# Patient Record
Sex: MALE | Race: WHITE | NOT HISPANIC OR LATINO | Employment: FULL TIME | URBAN - METROPOLITAN AREA
[De-identification: names, ages, dates, MRNs, and addresses within clinical notes are randomized per-mention and may not be internally consistent; named-entity substitution may affect disease eponyms.]

---

## 2019-03-04 PROBLEM — I10 HTN (HYPERTENSION): Status: ACTIVE | Noted: 2019-03-04

## 2019-03-04 PROBLEM — C34.90 METASTATIC LUNG CANCER (METASTASIS FROM LUNG TO OTHER SITE) (CMS/HCC): Status: ACTIVE | Noted: 2019-03-04

## 2019-03-04 PROBLEM — C79.31 LUNG CANCER METASTATIC TO BRAIN (CMS/HCC): Status: ACTIVE | Noted: 2019-03-04

## 2019-03-04 PROBLEM — I34.0 MITRAL REGURGITATION: Status: ACTIVE | Noted: 2019-03-04

## 2019-03-04 PROBLEM — E78.5 HYPERLIPIDEMIA: Status: ACTIVE | Noted: 2019-03-04

## 2019-03-04 NOTE — PROGRESS NOTES
Cardiac Surgery    Reason for consultation: Mitral regurgitation     HPI  Patient is a 58 y.o. male here for a surgical evaluation of his mitral regurgitation.  He is referred by Dr. Dempsey.  He has a history of lung cancer diagnosed 5 years ago with mets to the brain that was treated with radiation.  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  This was treated with steroids with good results.  Most recent Brain scan showed improvement.  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  In November he was diagnosed with a murmur and an echo was done that showed mitral regurgitation.  At that time he was experiencing increased BYERS with a significant weight gain (on steroids) as well as edema.  He was started on diuretics.  Recently they were increased to BID with improvement of symptoms.  He does have BYERS with stair climbing but is able to walk flat surfaces without much BYERS.   He rates the BYERS as moderate and it is relieved by rest.  Associated symptoms include LE edema.  He denies fatigue, chest pain, dizziness.  He does have a recent CT that shows a fast growing LLL nodule and a small right pleural effusion.     Medical History:   Past Medical History:   Diagnosis Date   • HTN (hypertension) 3/4/2019   • Hyperlipidemia 3/4/2019   • Metastatic lung cancer (metastasis from lung to other site) (CMS/HCC) (HCC) 3/4/2019   • Mitral regurgitation 3/4/2019       Surgical History:   Past Surgical History:   Procedure Laterality Date   • CARDIAC CATHETERIZATION     • HERNIA REPAIR         Allergies: Patient has no known allergies.    Current Outpatient Prescriptions   Medication Sig Dispense Refill   • chlorhexidine (PERIDEX) 0.12 % solution Swish and spit 15 mL 2 (two) times a day.     • furosemide (LASIX) 40 mg tablet Take 40 mg by mouth 2 (two) times a day.     • lisinopril (PRINIVIL) 20 mg tablet Take 20 mg by mouth 2 (two) times a day.     • lorlatinib (LORBRENA) 25 mg tablet Take 75 mg by mouth daily.      • metoprolol succinate XL (TOPROL-XL) 25 mg 24 hr tablet Take 25 mg by mouth daily.     • potassium chloride (KLOR-CON ORAL) Take 10 mEq by mouth. Every other day       • rosuvastatin (CRESTOR) 10 mg tablet Take 10 mg by mouth daily.       No current facility-administered medications for this visit.        Social History:   Social History     Social History   • Marital status:      Spouse name: N/A   • Number of children: N/A   • Years of education: N/A     Social History Main Topics   • Smoking status: Never Smoker   • Smokeless tobacco: Never Used   • Alcohol use Yes   • Drug use: No   • Sexual activity: Not Asked     Other Topics Concern   • None     Social History Narrative   • None       Family History:   Family History   Problem Relation Age of Onset   • COPD Mother    • Multiple myeloma Mother    • Lung cancer Father    • Cancer Sister         thyroid       Review of Systems  Review of Systems   Constitutional: Negative for fatigue.   Respiratory: Positive for shortness of breath. Negative for chest tightness.    Cardiovascular: Negative for chest pain.   Musculoskeletal: Negative for arthralgias and back pain.   Neurological: Positive for light-headedness. Negative for dizziness.   Psychiatric/Behavioral: Negative for agitation, behavioral problems and confusion.   All other systems reviewed and are negative.      Objective     Vital Signs for the last 24 hours:  Temp:  [37 °C (98.6 °F)] 37 °C (98.6 °F)  Heart Rate:  [72] 72  Resp:  [14] 14  BP: (140)/(88) 140/88    Physical Exam   Constitutional: He is oriented to person, place, and time. He appears well-developed and well-nourished.   HENT:   Head: Atraumatic.   Eyes: EOM are normal. Pupils are equal, round, and reactive to light.   Neck: Neck supple.   Cardiovascular: Regular rhythm and intact distal pulses.    Murmur heard.   Systolic murmur is present with a grade of 2/6   Holosystolic radiating from apex to base   Pulmonary/Chest: Effort  normal and breath sounds normal.   Abdominal: Bowel sounds are normal.   Musculoskeletal: Normal range of motion. He exhibits edema.   Neurological: He is alert and oriented to person, place, and time.   Skin: Skin is warm and dry. Capillary refill takes 2 to 3 seconds.   Psychiatric: He has a normal mood and affect. His behavior is normal. Thought content normal.   Vitals reviewed.          Assessment/Plan     Assessment:  Mitral Insufficiency  Echo, and CTA reviewed by Dr. Pepe.  Severe mitral regurgitation with large posterior leaflet flail encompassing the entire P2 portion.  Ruptured chorde present. PFO present. Ef 45%.  Symptoms medically managed with diuretics.  NYHC III.  No CAD.  No chest discomfort and No syncopal or presyncopal episodes. Patient tolerating medical therapy. Right pleural effusion noted on CT.  Patient states this responded well to increased diuretics and has decreased in size.    Plan:  STS risk for surgical intervention/repair is < 1%.  Recommend a mitral valve repair procedure.  Will need a CTA abdomen and pelvis to determine surgical approach as minimally invasive.  He is scheduled for a repeat CTof the chest on Thursday of this week.  He will also need carotid ultrasound, dental clearance and preadmission testing prior to surgery.  I have discussed with the patient and the family the rationale for the procedure as well as possible alternatives.  We discussed potential risks and complications associated with this procedure.  The patient and family understand and agree to proceed.    Metastatic lung CA.  He was treated with chemo  For the lung and radiation therapy for the brain.  He is currently on oral chemo lorlantinib 75mg daily.  He follows with Dr. Post from oncology.  We do have a note that states 1 year survival is reasonable.    Plan:  Will need to determine what to do with chemo agent around the time of surgery.  When to hold and when to restart.    IKimberly,  CARIE am scribing for and in the presence of CARIE Patel

## 2019-03-05 ENCOUNTER — CONSULT (OUTPATIENT)
Dept: CARDIOTHORACIC SURGERY | Facility: CLINIC | Age: 59
End: 2019-03-05
Payer: COMMERCIAL

## 2019-03-05 VITALS
RESPIRATION RATE: 14 BRPM | DIASTOLIC BLOOD PRESSURE: 88 MMHG | BODY MASS INDEX: 34.27 KG/M2 | OXYGEN SATURATION: 95 % | HEART RATE: 72 BPM | TEMPERATURE: 98.6 F | WEIGHT: 253 LBS | HEIGHT: 72 IN | SYSTOLIC BLOOD PRESSURE: 140 MMHG

## 2019-03-05 DIAGNOSIS — I34.0 MITRAL VALVE INSUFFICIENCY, UNSPECIFIED ETIOLOGY: Primary | ICD-10-CM

## 2019-03-05 PROCEDURE — 99204 OFFICE O/P NEW MOD 45 MIN: CPT | Performed by: THORACIC SURGERY (CARDIOTHORACIC VASCULAR SURGERY)

## 2019-03-05 RX ORDER — LISINOPRIL 20 MG/1
20 TABLET ORAL 2 TIMES DAILY
COMMUNITY
End: 2019-04-11 | Stop reason: HOSPADM

## 2019-03-05 RX ORDER — MUPIROCIN 20 MG/G
1 OINTMENT TOPICAL 2 TIMES DAILY
Qty: 22 G | Refills: 0 | Status: SHIPPED | OUTPATIENT
Start: 2019-03-05 | End: 2019-03-26 | Stop reason: SDUPTHER

## 2019-03-05 RX ORDER — ROSUVASTATIN CALCIUM 10 MG/1
10 TABLET, COATED ORAL DAILY
COMMUNITY

## 2019-03-05 RX ORDER — CHLORHEXIDINE GLUCONATE ORAL RINSE 1.2 MG/ML
15 SOLUTION DENTAL 2 TIMES DAILY
COMMUNITY
End: 2019-04-11 | Stop reason: HOSPADM

## 2019-03-05 RX ORDER — FUROSEMIDE 40 MG/1
40 TABLET ORAL 2 TIMES DAILY
COMMUNITY
End: 2019-05-21

## 2019-03-05 RX ORDER — METOPROLOL SUCCINATE 25 MG/1
50 TABLET, EXTENDED RELEASE ORAL DAILY
COMMUNITY

## 2019-03-05 ASSESSMENT — ENCOUNTER SYMPTOMS
BACK PAIN: 0
SHORTNESS OF BREATH: 1
AGITATION: 0
ARTHRALGIAS: 0
DIZZINESS: 0
CHEST TIGHTNESS: 0
LIGHT-HEADEDNESS: 1
FATIGUE: 0
CONFUSION: 0

## 2019-03-06 ENCOUNTER — TELEPHONE (OUTPATIENT)
Dept: CARDIOTHORACIC SURGERY | Facility: CLINIC | Age: 59
End: 2019-03-06

## 2019-03-06 NOTE — TELEPHONE ENCOUNTER
Called Dr. Post regarding patient's ongoing cancer treatment.  Explained a transcatheter Mitral clip not best option based on valve pathology and that minimally invasive mitral repair offered to patient.  He expressed concern with patients osteonecrosis of the jaw and that the patient has 2 areas of exposed bone that ENT is following.  It is not currently infected and ENT will debride as necessary.  Discussed the fast growing LLL nodule that he suspects is inflammatory.  Also discussed the right pleural effusion and if still present vs. Worse the need for a diagnostic thoracentesis to r/o malignant cells prior to scheduled 3/29 surgery.  CTA scheduled for 3/12.   Dr. Post will discuss recommendations for holding oral chemo Lorlatinib with patient independent of this office. Will touch base with Dr. Post on 3/14 to discuss case further.

## 2019-03-07 ENCOUNTER — TELEPHONE (OUTPATIENT)
Dept: SCHEDULING | Facility: CLINIC | Age: 59
End: 2019-03-07

## 2019-03-07 NOTE — TELEPHONE ENCOUNTER
OK.  Will wait until after his CTA next week and a discussion with his cardiologist and the confirmation of his surgical procedure before formulating a letter.

## 2019-03-07 NOTE — TELEPHONE ENCOUNTER
"Juan Manuel-  I talked with Mrs. Fields - we do participate but they have to pay out of network as it's consisdered \"out of network\" so the plan is like a 80/20 plan up until a certain amount.  She said you can email the letter.  Thanks.      "

## 2019-03-07 NOTE — TELEPHONE ENCOUNTER
Heidi wife called for patient. He is scheduled for  MVR on 3/29. We are out of network with his insurance. Can you write a letter stating there is no one in the [place] area that does this procedure? Dr Dempsey suggested she ask for letter.  She is hoping the insurance would treat Dr Pepe as in network.  If letter can be written please mail to patient and call to advise to watch for it.   [phone].

## 2019-03-08 PROBLEM — I34.0 MITRAL VALVE INSUFFICIENCY: Status: ACTIVE | Noted: 2019-03-08

## 2019-03-20 ENCOUNTER — ANESTHESIA EVENT (INPATIENT)
Dept: OPERATING ROOM | Facility: HOSPITAL | Age: 59
DRG: 003 | End: 2019-03-20
Payer: COMMERCIAL

## 2019-03-20 ENCOUNTER — APPOINTMENT (OUTPATIENT)
Dept: PREADMISSION TESTING | Facility: HOSPITAL | Age: 59
End: 2019-03-20
Attending: THORACIC SURGERY (CARDIOTHORACIC VASCULAR SURGERY)
Payer: COMMERCIAL

## 2019-03-20 VITALS
HEIGHT: 72 IN | OXYGEN SATURATION: 93 % | RESPIRATION RATE: 16 BRPM | BODY MASS INDEX: 33.67 KG/M2 | HEART RATE: 65 BPM | TEMPERATURE: 97.7 F | WEIGHT: 248.6 LBS | DIASTOLIC BLOOD PRESSURE: 79 MMHG | SYSTOLIC BLOOD PRESSURE: 123 MMHG

## 2019-03-20 DIAGNOSIS — I34.0 MITRAL VALVE INSUFFICIENCY, UNSPECIFIED ETIOLOGY: ICD-10-CM

## 2019-03-20 DIAGNOSIS — Z01.818 ENCOUNTER FOR PREADMISSION TESTING: Primary | ICD-10-CM

## 2019-03-20 LAB
ABO + RH BLD: NORMAL
ALBUMIN SERPL-MCNC: 3.9 G/DL (ref 3.4–5)
ALP SERPL-CCNC: 47 IU/L (ref 35–126)
ALT SERPL-CCNC: 23 IU/L (ref 16–63)
ANION GAP SERPL CALC-SCNC: 12 MEQ/L (ref 3–15)
AST SERPL-CCNC: 19 IU/L (ref 15–41)
BILIRUB SERPL-MCNC: 0.5 MG/DL (ref 0.3–1.2)
BILIRUB UR QL STRIP.AUTO: NEGATIVE MG/DL
BLD GP AB SCN SERPL QL: NEGATIVE
BLOOD BANK CMNT PATIENT-IMP: NORMAL
BNP SERPL-MCNC: 79 PG/ML
BUN SERPL-MCNC: 9 MG/DL (ref 8–20)
CALCIUM SERPL-MCNC: 9.4 MG/DL (ref 8.9–10.3)
CHLORIDE SERPL-SCNC: 105 MEQ/L (ref 98–109)
CLARITY UR REFRACT.AUTO: CLEAR
CO2 SERPL-SCNC: 24 MEQ/L (ref 22–32)
COLOR UR AUTO: YELLOW
CREAT SERPL-MCNC: 1.1 MG/DL
D AG BLD QL: POSITIVE
ERYTHROCYTE [DISTWIDTH] IN BLOOD BY AUTOMATED COUNT: 14.4 % (ref 11.6–14.4)
GFR SERPL CREATININE-BSD FRML MDRD: >60 ML/MIN/1.73M*2
GLUCOSE SERPL-MCNC: 99 MG/DL (ref 70–99)
GLUCOSE UR STRIP.AUTO-MCNC: NEGATIVE MG/DL
HCT VFR BLDCO AUTO: 40.9 %
HGB BLD-MCNC: 12.8 G/DL
HGB UR QL STRIP.AUTO: NEGATIVE
INR PPP: 1 INR
KETONES UR STRIP.AUTO-MCNC: NEGATIVE MG/DL
LABORATORY COMMENT REPORT: NORMAL
LEUKOCYTE ESTERASE UR QL STRIP.AUTO: NEGATIVE
MCH RBC QN AUTO: 27.7 PG (ref 28–33.2)
MCHC RBC AUTO-ENTMCNC: 31.3 G/DL (ref 32.2–36.5)
MCV RBC AUTO: 88.5 FL (ref 83–98)
NITRITE UR QL STRIP.AUTO: NEGATIVE
PDW BLD AUTO: 10.7 FL (ref 9.4–12.4)
PH UR STRIP.AUTO: 6.5 [PH]
PLATELET # BLD AUTO: 217 K/UL
POTASSIUM SERPL-SCNC: 3.8 MEQ/L (ref 3.6–5.1)
PREALB SERPL-MCNC: 25.1 MG/DL (ref 18–38)
PROT SERPL-MCNC: 6.5 G/DL (ref 6–8.2)
PROT UR QL STRIP.AUTO: NEGATIVE
PROTHROMBIN TIME: 12.8 SEC (ref 12.2–14.5)
RBC # BLD AUTO: 4.62 M/UL (ref 4.5–5.8)
SODIUM SERPL-SCNC: 141 MEQ/L (ref 136–144)
SP GR UR REFRACT.AUTO: 1.01
UROBILINOGEN UR STRIP-ACNC: 0.2 EU/DL
WBC # BLD AUTO: 7.54 K/UL

## 2019-03-20 PROCEDURE — 86901 BLOOD TYPING SEROLOGIC RH(D): CPT

## 2019-03-20 PROCEDURE — 86920 COMPATIBILITY TEST SPIN: CPT | Mod: 91

## 2019-03-20 PROCEDURE — 36415 COLL VENOUS BLD VENIPUNCTURE: CPT

## 2019-03-20 PROCEDURE — 81003 URINALYSIS AUTO W/O SCOPE: CPT

## 2019-03-20 PROCEDURE — 85610 PROTHROMBIN TIME: CPT

## 2019-03-20 PROCEDURE — 86920 COMPATIBILITY TEST SPIN: CPT

## 2019-03-20 PROCEDURE — 84134 ASSAY OF PREALBUMIN: CPT

## 2019-03-20 PROCEDURE — 83880 ASSAY OF NATRIURETIC PEPTIDE: CPT

## 2019-03-20 PROCEDURE — 93005 ELECTROCARDIOGRAM TRACING: CPT

## 2019-03-20 PROCEDURE — 85027 COMPLETE CBC AUTOMATED: CPT

## 2019-03-20 PROCEDURE — 87081 CULTURE SCREEN ONLY: CPT

## 2019-03-20 PROCEDURE — 82040 ASSAY OF SERUM ALBUMIN: CPT

## 2019-03-20 ASSESSMENT — ENCOUNTER SYMPTOMS
NERVOUS/ANXIOUS: 1
SHORTNESS OF BREATH: 1

## 2019-03-20 NOTE — ANESTHESIA PREPROCEDURE EVALUATION
"Anesthesia ROS/MED HX    Anesthesia History    Previous anesthetics  No history of anesthetic complications  Neuro/Psych   No TIA  No CVA  no neuropathy  Cardiovascular   BYERS   Valvular problems/murmurs (severe MR)   hypertension   CHF (EF 45%)   ECG reviewed and echocardiogram reviewed   NYHA Classification: III  Hematological    anemia (12.8)  GI/Hepatic  No difficulty swallowing  No GERD  Musculoskeletal- neg  Renal Disease- neg  Endo/Other  History of cancer and lung cancer (bony mets.  On oral chemo agent)  Body Habitus: Obese  Pediatric Considerations    Murmur  ROS/MED HX Comments:    Pulmonary: Lung CA   Neurology/Psychology: Brain necrosis of the brain treated with steroids    ECG: Incomplete RBBB   Endo: Has osteonecrosis of his \"jaw\" after chemotherapy.-- has had dental clearance      Past Surgical History:   Procedure Laterality Date   • CARDIAC CATHETERIZATION     • HERNIA REPAIR         Physical Exam    Airway   Mallampati: III  Cardiovascular    Murmur  Pulmonary - normal   clear to auscultation  Dental - normal        Anesthesia Plan    Plan: general    Technique: general endotracheal     Lines and Monitors: additional IV, arterial line, central line, BIS, PA catheter, cerebral oximetry and SAURABH     Airway: oral intubation and video laryngoscope   ASA 4  Blood Products:     Use of Blood Products Discussed: Yes     Consented to blood products  Anesthetic plan and risks discussed with: patient  Postop Plan:   Patient Disposition: ICU planned admission   Pain Management: IV analgesics  Comments:    Plan: Carotids ok    "

## 2019-03-20 NOTE — H&P
History and Physical  Pre-admission testing         HISTORY OF PRESENT ILLNESS      Wesley Fields is an 58 y.o. male with a past medical history ofhistory of lung cancer diagnosed 5 years ago with mets to the brain that was treated with radiation.  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  This was treated with steroids with good results.  Most recent Brain scan showed improvement.  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  In November he was diagnosed with a murmur and an echo was done that showed mitral regurgitation.  At that time he was experiencing increased BYERS with a significant weight gain (on steroids) as well as edema.  He was started on diuretics.  Recently they were increased to BID with improvement of symptoms.  He does have BYERS with stair climbing but is able to walk flat surfaces without much BYERS.   He rates the BYERS as moderate and it is relieved by rest.  Associated symptoms include LE edema.  He denies fatigue, chest pain, dizziness.  He does have a recent CT that shows a fast growing LLL nodule and a small right pleural effusion. His echocardiogram revealed Severe mitral regurgitation with large posterior leaflet flail encompassing the entire P2 portion.  Ruptured chorde present. PFO present. Ef 45%.  Symptoms medically managed with diuretics.  NYHC III.  No CAD.  No chest discomfort and No syncopal or presyncopal episodes. Patient tolerating medical therapy.  He is scheduled for VALVE MITRAL MINIMALLY INVASIVE [[id number]     PAST MEDICAL AND SURGICAL HISTORY      Past Medical History:   Diagnosis Date   • CHF (congestive heart failure) (CMS/HCC) (HCC)    • HTN (hypertension) 3/4/2019   • Hyperlipidemia 3/4/2019   • Metastatic lung cancer (metastasis from lung to other site) (CMS/HCC) (HCC) 3/4/2019    chemo x2, XRT   • Mitral regurgitation 3/4/2019   • Multiple thyroid nodules    • Radiation therapy induced brain necrosis     last steroid 1/2019       Past Surgical History:    Procedure Laterality Date   • CARDIAC CATHETERIZATION     • CLAVICLE SURGERY Right     biopsy   • HERNIA REPAIR         PROBLEM LIST     Patient Active Problem List   Diagnosis   • HTN (hypertension)   • Hyperlipidemia   • 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)   • Mitral regurgitation   • Mitral valve insufficiency       MEDICATIONS        Current Outpatient Prescriptions:   •  chlorhexidine (PERIDEX) 0.12 % solution, Swish and spit 15 mL 2 (two) times a day., Disp: , Rfl:   •  furosemide (LASIX) 40 mg tablet, Take 40 mg by mouth 2 (two) times a day., Disp: , Rfl:   •  lisinopril (PRINIVIL) 20 mg tablet, Take 20 mg by mouth 2 (two) times a day., Disp: , Rfl:   •  lorlatinib (LORBRENA) 25 mg tablet, Take 75 mg by mouth daily., Disp: , Rfl:   •  metoprolol succinate XL (TOPROL-XL) 25 mg 24 hr tablet, Take 25 mg by mouth daily., Disp: , Rfl:   •  potassium chloride (KLOR-CON ORAL), Take 10 mEq by mouth. Every other day  , Disp: , Rfl:   •  rosuvastatin (CRESTOR) 10 mg tablet, Take 10 mg by mouth daily., Disp: , Rfl:     ALLERGIES      No Known Allergies    FAMILY HISTORY      Family History   Problem Relation Age of Onset   • COPD Mother    • Multiple myeloma Mother    • Lung cancer Father    • Cancer Sister         thyroid       SOCIAL HISTORY      Social History     Social History   • Marital status:      Spouse name: N/A   • Number of children: 4   • Years of education: N/A     Occupational History   • Not on file.     Social History Main Topics   • Smoking status: Never Smoker   • Smokeless tobacco: Never Used   • Alcohol use Yes      Comment: 2-3 beers/day   • Drug use: No   • Sexual activity: Not on file     Other Topics Concern   • Not on file     Social History Narrative    Patient lives with his wife in a 2 story home-bathroom on 2nd floor       REVIEW OF SYSTEMS      Review of Systems   Respiratory: Positive for shortness of breath.         On exertion   Cardiovascular: Positive for leg swelling.    Psychiatric/Behavioral: The patient is nervous/anxious.        PHYSICAL EXAMINATION      /79 (BP Location: Right upper arm, Patient Position: Sitting)   Pulse 65   Temp 36.5 °C (97.7 °F)   Resp 16   Ht 1.829 m (6')   Wt 113 kg (248 lb 9.6 oz)   SpO2 93%   BMI 33.72 kg/m²   Body mass index is 33.72 kg/m².    Physical Exam   Constitutional: He is oriented to person, place, and time. He appears well-developed and well-nourished.   HENT:   Head: Normocephalic and atraumatic.   Right Ear: External ear normal.   Left Ear: External ear normal.   Mouth/Throat: Oropharynx is clear and moist.   Eyes: Conjunctivae and EOM are normal. Pupils are equal, round, and reactive to light.   Neck: Normal range of motion. Neck supple.   Cardiovascular: Normal rate and regular rhythm.    Murmur heard.   Diastolic murmur is present with a grade of 3/6   Pulses:       Carotid pulses are on the right side with bruit.       Dorsalis pedis pulses are 2+ on the right side, and 2+ on the left side.        Posterior tibial pulses are 2+ on the right side, and 2+ on the left side.   Murmur heard best left 5th ICS   Pulmonary/Chest: Effort normal and breath sounds normal.   Abdominal: Soft. Bowel sounds are normal.   Large obese abdomen   Genitourinary:   Genitourinary Comments: deferred   Musculoskeletal: Normal range of motion. He exhibits edema.   +2 pedal ankle edema L>R   Neurological: He is alert and oriented to person, place, and time.   Skin: Skin is warm and dry.   Psychiatric: He has a normal mood and affect. His behavior is normal. Judgment and thought content normal.   Nursing note and vitals reviewed.         CARIE Blake  3/20/2019

## 2019-03-20 NOTE — PRE-PROCEDURE INSTRUCTIONS
1. We will call you between 3 pm and 7 pm on March 28, 2019 to determine that arrival time for your procedure. If you do not hear by 4:30 PM. Please call [phone] for arrival time.    2. Please report to [address], walk into [id number] and report to the admission desk on first floor on the day of your procedure.   3. Please follow the following fasting guidelines:   Nothing to eat or drink after midnight unless otherwise instructed by  your physician. No gum mints candy. Brush teeth/rinse Mouth   4.      5. Other Instructions: Per Dr Pepe   6. If you develop a cold, cough, fever, rash, or other symptom prior to the data of the procedure, please report it to your physician immediately.   7. If you need to cancel the procedure for any reason, please contact your physician or call the unit listed above.   8. Make arrangements to have someone drive you home from the procedure. If you have not arranged for transportation home, your surgery may be cancelled.    9. You may not take public transportation unless accompanied by a responsible person.   10. You may not drive a car or operate complex or potentially dangerous machinery for 24 hours following anesthesia and/or sedation.   11. If it is medically necessary for you to have a longer stay, you will be informed as soon as the decision is made.   12. Do not wear or bring anything of value to the hospital including jewelry of any kind. Do not wear make-up or contact lenses. DO bring your glasses and hearing aid.   13. No lotion, creams, powders, or oils on skin the morning of procedure    14. Dress in comfortable clothes.   15.  If instructed, please bring a copy of your Advanced Directive (Living Will/Durable Power of ) on the day of your procedure.      Pre operative instructions given as per protocol.  Form explained by: CARIE Blake     I have read and understand the above information. I have had sufficient opportunity to ask  questions I might have and they have been answered to my satisfaction. I agree to comply with the Patient Responsibilities listed above and have received a copy of this form.

## 2019-03-21 LAB
ATRIAL RATE: 65
P AXIS: 39
PR INTERVAL: 198
QRS DURATION: 106
QT INTERVAL: 432
QTC CALCULATION(BAZETT): 449
R AXIS: 62
T WAVE AXIS: 20
VENTRICULAR RATE: 65

## 2019-03-21 PROCEDURE — 93010 ELECTROCARDIOGRAM REPORT: CPT | Performed by: INTERNAL MEDICINE

## 2019-03-22 ENCOUNTER — PREP FOR CASE (OUTPATIENT)
Dept: CARDIOTHORACIC SURGERY | Facility: CLINIC | Age: 59
End: 2019-03-22

## 2019-03-22 LAB — MICROORGANISM SPEC CULT: NORMAL

## 2019-03-22 RX ORDER — CHLORHEXIDINE GLUCONATE ORAL RINSE 1.2 MG/ML
15 SOLUTION DENTAL ONCE
Status: CANCELLED | OUTPATIENT
Start: 2019-03-22 | End: 2019-03-22

## 2019-03-26 RX ORDER — MUPIROCIN 20 MG/G
1 OINTMENT TOPICAL 2 TIMES DAILY
Qty: 22 G | Refills: 0 | Status: SHIPPED | OUTPATIENT
Start: 2019-03-26 | End: 2019-04-11 | Stop reason: HOSPADM

## 2019-03-29 ENCOUNTER — ANESTHESIA (INPATIENT)
Dept: OPERATING ROOM | Facility: HOSPITAL | Age: 59
DRG: 003 | End: 2019-03-29
Payer: COMMERCIAL

## 2019-03-29 ENCOUNTER — ANESTHESIA EVENT (INPATIENT)
Dept: OPERATING ROOM | Facility: HOSPITAL | Age: 59
DRG: 003 | End: 2019-03-29
Payer: COMMERCIAL

## 2019-03-29 ENCOUNTER — HOSPITAL ENCOUNTER (INPATIENT)
Facility: HOSPITAL | Age: 59
LOS: 13 days | Discharge: HOME HEALTH | DRG: 003 | End: 2019-04-11
Attending: THORACIC SURGERY (CARDIOTHORACIC VASCULAR SURGERY) | Admitting: THORACIC SURGERY (CARDIOTHORACIC VASCULAR SURGERY)
Payer: COMMERCIAL

## 2019-03-29 ENCOUNTER — APPOINTMENT (INPATIENT)
Dept: RADIOLOGY | Facility: HOSPITAL | Age: 59
DRG: 003 | End: 2019-03-29
Attending: INTERNAL MEDICINE
Payer: COMMERCIAL

## 2019-03-29 ENCOUNTER — APPOINTMENT (INPATIENT)
Dept: RADIOLOGY | Facility: HOSPITAL | Age: 59
DRG: 003 | End: 2019-03-29
Attending: PHYSICIAN ASSISTANT
Payer: COMMERCIAL

## 2019-03-29 ENCOUNTER — ANCILLARY PROCEDURE (INPATIENT)
Dept: OPERATING ROOM | Facility: HOSPITAL | Age: 59
DRG: 003 | End: 2019-03-29
Attending: ANESTHESIOLOGY
Payer: COMMERCIAL

## 2019-03-29 DIAGNOSIS — I34.0 MITRAL VALVE INSUFFICIENCY, UNSPECIFIED ETIOLOGY: ICD-10-CM

## 2019-03-29 DIAGNOSIS — I34.0 NON-RHEUMATIC MITRAL REGURGITATION: ICD-10-CM

## 2019-03-29 DIAGNOSIS — I51.9 RIGHT VENTRICULAR DYSFUNCTION: Primary | ICD-10-CM

## 2019-03-29 PROBLEM — I50.32 CHF (CONGESTIVE HEART FAILURE), NYHA CLASS III, CHRONIC, DIASTOLIC (CMS/HCC): Status: ACTIVE | Noted: 2019-03-29

## 2019-03-29 LAB
ABO + RH BLD: NORMAL
ANION GAP SERPL CALC-SCNC: 11 MEQ/L (ref 3–15)
APTT PPP: 27 SEC (ref 23–35)
APTT PPP: 31 SEC (ref 23–35)
APTT PPP: 47 SEC (ref 23–35)
AT III ACT/NOR PPP CHRO: 96 % (ref 90–120)
BASE EXCESS BLDA CALC-SCNC: -0.8 MEQ/L
BASE EXCESS BLDA CALC-SCNC: -1.2 MEQ/L
BASE EXCESS BLDA CALC-SCNC: -1.3 MEQ/L
BASE EXCESS BLDA CALC-SCNC: -1.3 MEQ/L
BASE EXCESS BLDA CALC-SCNC: 0 MEQ/L
BASE EXCESS BLDA CALC-SCNC: 0.1 MEQ/L
BASE EXCESS BLDA CALC-SCNC: 2 MEQ/L
BASE EXCESS BLDA CALC-SCNC: 3.1 MEQ/L
BASE EXCESS BLDA CALC-SCNC: 3.8 MEQ/L
BASE EXCESS BLDA CALC-SCNC: 4.1 MEQ/L
BSA FOR ECHO PROCEDURE: 2.34 M2
BUN SERPL-MCNC: 14 MG/DL (ref 8–20)
CA-I BLD ISE-SCNC: <0.25 MMOL/L
CA-I BLD-SCNC: 0.94 MMOL/L (ref 1.15–1.27)
CA-I BLD-SCNC: 0.98 MMOL/L (ref 1.15–1.27)
CA-I BLD-SCNC: 0.98 MMOL/L (ref 1.15–1.27)
CA-I BLD-SCNC: 0.99 MMOL/L (ref 1.15–1.27)
CA-I BLD-SCNC: 1.03 MMOL/L (ref 1.15–1.27)
CA-I BLD-SCNC: 1.06 MMOL/L (ref 1.15–1.27)
CA-I BLD-SCNC: 1.12 MMOL/L (ref 1.15–1.27)
CA-I BLD-SCNC: 1.12 MMOL/L (ref 1.15–1.27)
CA-I BLD-SCNC: 1.15 MMOL/L (ref 1.15–1.27)
CA-I BLD-SCNC: 1.15 MMOL/L (ref 1.15–1.27)
CA-I BLD-SCNC: 1.17 MMOL/L (ref 1.15–1.27)
CA-I BLD-SCNC: 1.21 MMOL/L (ref 1.15–1.27)
CALCIUM SERPL-MCNC: 8.9 MG/DL (ref 8.9–10.3)
CFT BLD TEG: 1.2 MIN (ref 1–3)
CFT P HPASE BLD TEG: NORMAL MIN (ref 1–3)
CHLORIDE SERPL-SCNC: 111 MEQ/L (ref 98–109)
CLOT ANGLE BLD TEG: 72.1 DEGREES (ref 53–72)
CLOT ANGLE P HPASE BLD TEG: NORMAL DEGREES (ref 53–72)
CLOT INIT P HPASE BLD TEG: 5.2 MIN (ref 5–10)
CO2 BLDA-SCNC: 23 MEQ/L (ref 22–32)
CO2 BLDA-SCNC: 24 MEQ/L (ref 22–32)
CO2 BLDA-SCNC: 24 MEQ/L (ref 22–32)
CO2 BLDA-SCNC: 25 MEQ/L (ref 22–32)
CO2 BLDA-SCNC: 25 MEQ/L (ref 22–32)
CO2 BLDA-SCNC: 26 MEQ/L (ref 22–32)
CO2 BLDA-SCNC: 27 MEQ/L (ref 22–32)
CO2 BLDA-SCNC: 27 MEQ/L (ref 22–32)
CO2 BLDA-SCNC: 28 MEQ/L (ref 22–32)
CO2 BLDA-SCNC: 30 MEQ/L (ref 22–32)
CO2 BLDA-SCNC: 30 MEQ/L (ref 22–32)
CO2 BLDA-SCNC: 31 MEQ/L (ref 22–32)
CO2 SERPL-SCNC: 21 MEQ/L (ref 22–32)
CREAT SERPL-MCNC: 1.3 MG/DL
CT.EXTRINSIC BLD ROTEM: 5.7 MIN (ref 5–10)
D AG BLD QL: POSITIVE
EF (A4C): 57 %
ERYTHROCYTE [DISTWIDTH] IN BLOOD BY AUTOMATED COUNT: 14.5 % (ref 11.6–14.4)
ERYTHROCYTE [DISTWIDTH] IN BLOOD BY AUTOMATED COUNT: 14.5 % (ref 11.6–14.4)
ERYTHROCYTE [DISTWIDTH] IN BLOOD BY AUTOMATED COUNT: 14.6 % (ref 11.6–14.4)
ERYTHROCYTE [DISTWIDTH] IN BLOOD BY AUTOMATED COUNT: 14.7 % (ref 11.6–14.4)
EST RIGHT VENT SYSTOLIC PRESSURE BY TRICUSPID REGURGITATION JET: 43 MMHG
EST. AVERAGE GLUCOSE BLD GHB EST-MCNC: 114 MG/DL
FIBRINOGEN PPP-MCNC: 389 MG/DL (ref 220–480)
FIBRINOGEN PPP-MCNC: 398 MG/DL (ref 220–480)
FIBRINOGEN PPP-MCNC: 432 MG/DL (ref 220–480)
FIBRINOGEN PPP-MCNC: 495 MG/DL (ref 220–480)
FIBRINOGEN PPP-MCNC: 505 MG/DL (ref 220–480)
GFR SERPL CREATININE-BSD FRML MDRD: 57.1 ML/MIN/1.73M*2
GLUCOSE BLDA-MCNC: 117 MG/DL (ref 65–95)
GLUCOSE BLDA-MCNC: 134 MG/DL (ref 65–95)
GLUCOSE BLDA-MCNC: 161 MG/DL (ref 65–95)
GLUCOSE BLDA-MCNC: 164 MG/DL (ref 65–95)
GLUCOSE BLDA-MCNC: 170 MG/DL (ref 65–95)
GLUCOSE BLDA-MCNC: 173 MG/DL (ref 65–95)
GLUCOSE BLDA-MCNC: 175 MG/DL (ref 65–95)
GLUCOSE BLDA-MCNC: 176 MG/DL (ref 65–95)
GLUCOSE BLDA-MCNC: 177 MG/DL (ref 65–95)
GLUCOSE BLDA-MCNC: 179 MG/DL (ref 65–95)
GLUCOSE BLDA-MCNC: 188 MG/DL (ref 65–95)
GLUCOSE BLDA-MCNC: 191 MG/DL (ref 65–95)
GLUCOSE SERPL-MCNC: 175 MG/DL (ref 70–99)
HBA1C MFR BLD HPLC: 5.6 %
HCO3 BLDA-SCNC: 22 MEQ/L (ref 21–28)
HCO3 BLDA-SCNC: 23 MEQ/L (ref 21–28)
HCO3 BLDA-SCNC: 23 MEQ/L (ref 21–28)
HCO3 BLDA-SCNC: 24 MEQ/L (ref 21–28)
HCO3 BLDA-SCNC: 24 MEQ/L (ref 21–28)
HCO3 BLDA-SCNC: 25 MEQ/L (ref 21–28)
HCO3 BLDA-SCNC: 25 MEQ/L (ref 21–28)
HCO3 BLDA-SCNC: 26 MEQ/L (ref 21–28)
HCO3 BLDA-SCNC: 27 MEQ/L (ref 21–28)
HCO3 BLDA-SCNC: 29 MEQ/L (ref 21–28)
HCT VFR BLD CALC: 65 %
HCT VFR BLDA CALC: 25 % (ref 36–48)
HCT VFR BLDA CALC: 26 % (ref 36–48)
HCT VFR BLDA CALC: 28 % (ref 36–48)
HCT VFR BLDA CALC: 29 % (ref 36–48)
HCT VFR BLDA CALC: 40 % (ref 36–48)
HCT VFR BLDCO AUTO: 26.7 %
HCT VFR BLDCO AUTO: 27.9 %
HCT VFR BLDCO AUTO: 28.5 %
HCT VFR BLDCO AUTO: 29.1 %
HCT VFR BLDCO AUTO: 29.2 %
HCT VFR BLDCO AUTO: 37 %
HGB BLD-MCNC: 12 G/DL
HGB BLD-MCNC: 8.8 G/DL
HGB BLD-MCNC: 9.3 G/DL
HGB BLD-MCNC: 9.3 G/DL
HGB BLD-MCNC: 9.4 G/DL
HGB BLD-MCNC: 9.4 G/DL
HGB FREE PLAS-MCNC: 30 MG/DL
HGB FREE PLAS-MCNC: 40 MG/DL
INR PPP: 1.2 INR
INR PPP: 1.3 INR
LABORATORY COMMENT REPORT: NORMAL
LACTATE BLD-SCNC: 2.4 MMOL/L
LACTATE BLDA-SCNC: 0.9 MMOL/L (ref 0.4–1.6)
LACTATE BLDA-SCNC: 0.9 MMOL/L (ref 0.4–1.6)
LACTATE BLDA-SCNC: 1 MMOL/L (ref 0.4–1.6)
LACTATE BLDA-SCNC: 1.1 MMOL/L (ref 0.4–1.6)
LACTATE BLDA-SCNC: 1.4 MMOL/L (ref 0.4–1.6)
LACTATE BLDA-SCNC: 1.4 MMOL/L (ref 0.4–1.6)
LACTATE BLDA-SCNC: 1.6 MMOL/L (ref 0.4–1.6)
LACTATE BLDA-SCNC: 1.8 MMOL/L (ref 0.4–1.6)
LACTATE BLDA-SCNC: 1.9 MMOL/L (ref 0.4–1.6)
LACTATE BLDA-SCNC: 2 MMOL/L (ref 0.4–1.6)
LACTATE BLDA-SCNC: 2.3 MMOL/L (ref 0.4–1.6)
LACTATE BLDA-SCNC: 2.6 MMOL/L (ref 0.4–1.6)
LEFT VENTRICLE DIASTOLIC VOLUME INDEX: 29.49 CM3/M2
LEFT VENTRICLE DIASTOLIC VOLUME: 69 CM3
LEFT VENTRICLE SYSTOLIC VOLUME INDEX: 12.82 CM3/M2
LEFT VENTRICLE SYSTOLIC VOLUME: 30 CM3
LVAD-AP4: 25.8 CM2
LVAS-AP4: 13.3 CM2
LVLD-AP4: 7.94 CM
LVLS-AP4: 5.11 CM
LVOT 2D: 2.4 CM
LVOT A: 4.52 CM2
MAGNESIUM SERPL-MCNC: 2.4 MG/DL (ref 1.8–2.5)
MCF BLD TEG: 60.4 MM (ref 50–70)
MCF P HPASE BLD TEG: NORMAL MM (ref 50–70)
MCH RBC QN AUTO: 27.7 PG (ref 28–33.2)
MCH RBC QN AUTO: 27.7 PG (ref 28–33.2)
MCH RBC QN AUTO: 27.9 PG (ref 28–33.2)
MCH RBC QN AUTO: 28.6 PG (ref 28–33.2)
MCH RBC QN AUTO: 28.7 PG (ref 28–33.2)
MCH RBC QN AUTO: 29.1 PG (ref 28–33.2)
MCHC RBC AUTO-ENTMCNC: 31.8 G/DL (ref 32.2–36.5)
MCHC RBC AUTO-ENTMCNC: 32 G/DL (ref 32.2–36.5)
MCHC RBC AUTO-ENTMCNC: 32.4 G/DL (ref 32.2–36.5)
MCHC RBC AUTO-ENTMCNC: 33 G/DL (ref 32.2–36.5)
MCHC RBC AUTO-ENTMCNC: 33 G/DL (ref 32.2–36.5)
MCHC RBC AUTO-ENTMCNC: 33.7 G/DL (ref 32.2–36.5)
MCV RBC AUTO: 85.3 FL (ref 83–98)
MCV RBC AUTO: 85.5 FL (ref 83–98)
MCV RBC AUTO: 86.6 FL (ref 83–98)
MCV RBC AUTO: 86.9 FL (ref 83–98)
MCV RBC AUTO: 87.4 FL (ref 83–98)
MCV RBC AUTO: 88.4 FL (ref 83–98)
MV MEAN GRADIENT: 2 MMHG
MV PEAK GRADIENT: 4 MMHG
MV VTI: 29.3 CM
PCO2 BLDA: 28 MM HG (ref 35–48)
PCO2 BLDA: 33 MM HG (ref 35–48)
PCO2 BLDA: 34 MM HG (ref 35–48)
PCO2 BLDA: 40 MM HG (ref 35–48)
PCO2 BLDA: 41 MM HG (ref 35–48)
PCO2 BLDA: 43 MM HG (ref 35–48)
PCO2 BLDA: 44 MM HG (ref 35–48)
PCO2 BLDA: 47 MM HG (ref 35–48)
PCO2 BLDA: 47 MM HG (ref 35–48)
PCO2 BLDA: 53 MM HG (ref 35–48)
PDW BLD AUTO: 10 FL (ref 9.4–12.4)
PDW BLD AUTO: 10 FL (ref 9.4–12.4)
PDW BLD AUTO: 10.1 FL (ref 9.4–12.4)
PDW BLD AUTO: 10.4 FL (ref 9.4–12.4)
PDW BLD AUTO: 10.6 FL (ref 9.4–12.4)
PDW BLD AUTO: 11 FL (ref 9.4–12.4)
PH BLD: >7.7 PH
PH BLDA: 7.33 PH (ref 7.35–7.45)
PH BLDA: 7.35 PH (ref 7.35–7.45)
PH BLDA: 7.35 PH (ref 7.35–7.45)
PH BLDA: 7.37 PH (ref 7.35–7.45)
PH BLDA: 7.38 PH (ref 7.35–7.45)
PH BLDA: 7.38 PH (ref 7.35–7.45)
PH BLDA: 7.43 PH (ref 7.35–7.45)
PH BLDA: 7.43 PH (ref 7.35–7.45)
PH BLDA: 7.44 PH (ref 7.35–7.45)
PH BLDA: 7.45 PH (ref 7.35–7.45)
PH BLDA: 7.45 PH (ref 7.35–7.45)
PH BLDA: 7.5 PH (ref 7.35–7.45)
PHOSPHATE SERPL-MCNC: 4.5 MG/DL (ref 2.4–4.7)
PLATELET # BLD AUTO: 137 K/UL
PLATELET # BLD AUTO: 142 K/UL
PLATELET # BLD AUTO: 152 K/UL
PLATELET # BLD AUTO: 155 K/UL
PLATELET # BLD AUTO: 160 K/UL
PLATELET # BLD AUTO: 168 K/UL
PO2 BLD: 183 MM HG
PO2 BLDA: 122 MM HG (ref 83–100)
PO2 BLDA: 142 MM HG (ref 83–100)
PO2 BLDA: 175 MM HG (ref 83–100)
PO2 BLDA: 220 MM HG (ref 83–100)
PO2 BLDA: 237 MM HG (ref 83–100)
PO2 BLDA: 243 MM HG (ref 83–100)
PO2 BLDA: 325 MM HG (ref 83–100)
PO2 BLDA: 388 MM HG (ref 83–100)
PO2 BLDA: 394 MM HG (ref 83–100)
PO2 BLDA: 437 MM HG (ref 83–100)
PO2 BLDA: 470 MM HG (ref 83–100)
PO2 BLDA: 84 MM HG (ref 83–100)
POCT ACT-HR: 131 SEC (ref 100–140)
POCT ACT-HR: 139 SEC (ref 100–140)
POCT ACT-HR: 429 SEC (ref 100–140)
POCT ACT-HR: 478 SEC (ref 100–140)
POCT ACT-HR: 538 SEC (ref 100–140)
POCT ACT-HR: 578 SEC (ref 100–140)
POCT ACT-HR: 628 SEC (ref 100–140)
POCT ACT-HR: 648 SEC (ref 100–140)
POCT ACT-HR: 698 SEC (ref 100–140)
POCT ACT-HR: 758 SEC (ref 100–140)
POCT ACT-HR: >0 SEC (ref 100–140)
POCT GLUCOSE, OTHER: 25 MG/DL
POCT HDR AVG ACT 1&2: 464 SEC
POCT HDR AVG ACT 3&4: 295 SEC
POCT HDR AVG ACT 5&6: 168 SEC
POCT HDR BASELINE ACT: 168 SEC
POCT HDR PROJECTED HEPARIN CONC: 2 MG/KG
POCT HDR SLOPE: 112 SEC/UNIT/ML (ref 63–131)
POCT HDR TARGET ACT: 480 SEC
POCT HEP TEST CONC: 3 MG/KG
POCT HEP TEST CONC: 3 MG/KG
POCT HEP TEST CONC: 4 MG/KG
POCT PATIENT TEMPERATURE: 98.6 °F (ref 97–99)
POCT PROTAMINE DOSE: 358 MG
POCT PROTAMINE DOSE: 358 MG
POCT PROTAMINE DOSE: 477 MG
POCT PROTOCOL HEP CONC: 4 MG/KG
POCT SOURCE: NORMAL
POCT TEST (BLD GAS): ABNORMAL
POCT TEST (BLD GAS): NORMAL
POCT TEST: ABNORMAL
POCT TEST: NORMAL
POCT TOTAL HEPARIN REQD: 0 UNITS
POCT TOTAL HEPARIN REQD: ABNORMAL UNITS
POCT TOTAL HEPARIN REQD: ABNORMAL UNITS
POTASSIUM BLD-SCNC: 2.8 MMOL/L
POTASSIUM BLDA-SCNC: 3.2 MEQ/L (ref 3.4–4.5)
POTASSIUM BLDA-SCNC: 3.8 MEQ/L (ref 3.4–4.5)
POTASSIUM BLDA-SCNC: 3.9 MEQ/L (ref 3.4–4.5)
POTASSIUM BLDA-SCNC: 3.9 MEQ/L (ref 3.4–4.5)
POTASSIUM BLDA-SCNC: 4.1 MEQ/L (ref 3.4–4.5)
POTASSIUM BLDA-SCNC: 4.2 MEQ/L (ref 3.4–4.5)
POTASSIUM BLDA-SCNC: 4.2 MEQ/L (ref 3.4–4.5)
POTASSIUM BLDA-SCNC: 4.4 MEQ/L (ref 3.4–4.5)
POTASSIUM BLDA-SCNC: 4.4 MEQ/L (ref 3.4–4.5)
POTASSIUM BLDA-SCNC: 5.3 MEQ/L (ref 3.4–4.5)
POTASSIUM SERPL-SCNC: 4 MEQ/L (ref 3.6–5.1)
POTASSIUM SERPL-SCNC: 4.2 MEQ/L (ref 3.6–5.1)
POTASSIUM SERPL-SCNC: 4.2 MEQ/L (ref 3.6–5.1)
PROTHROMBIN TIME: 14.9 SEC (ref 12.2–14.5)
PROTHROMBIN TIME: 15.6 SEC (ref 12.2–14.5)
RAP: 20 MMHG
RBC # BLD AUTO: 3.02 M/UL (ref 4.5–5.8)
RBC # BLD AUTO: 3.27 M/UL (ref 4.5–5.8)
RBC # BLD AUTO: 3.29 M/UL (ref 4.5–5.8)
RBC # BLD AUTO: 3.33 M/UL (ref 4.5–5.8)
RBC # BLD AUTO: 3.36 M/UL (ref 4.5–5.8)
RBC # BLD AUTO: 4.33 M/UL (ref 4.5–5.8)
SAO2 % BLDA: 100 % (ref 93–98)
SAO2 % BLDA: 97 % (ref 93–98)
SAO2 % BLDA: 99 % (ref 93–98)
SAO2 % BLDA: 99 % (ref 93–98)
SAO2 % BLDV: 57 % (ref 30–60)
SODIUM BLD-SCNC: 143 MMOL/L
SODIUM BLDA-SCNC: 135 MEQ/L (ref 136–145)
SODIUM BLDA-SCNC: 135 MEQ/L (ref 136–145)
SODIUM BLDA-SCNC: 136 MEQ/L (ref 136–145)
SODIUM BLDA-SCNC: 138 MEQ/L (ref 136–145)
SODIUM BLDA-SCNC: 139 MEQ/L (ref 136–145)
SODIUM BLDA-SCNC: 140 MEQ/L (ref 136–145)
SODIUM BLDA-SCNC: 142 MEQ/L (ref 136–145)
SODIUM BLDA-SCNC: 142 MEQ/L (ref 136–145)
SODIUM SERPL-SCNC: 143 MEQ/L (ref 136–144)
TEG SPECIMEN TYPE (HEP): NORMAL
TEG SPECIMEN TYPE: ABNORMAL
TR MAX PG: 23 MMHG
TRICUSPID VALVE PEAK REGURGITATION VELOCITY: 2.41 M/S
WBC # BLD AUTO: 10.34 K/UL
WBC # BLD AUTO: 11.01 K/UL
WBC # BLD AUTO: 11.06 K/UL
WBC # BLD AUTO: 12.68 K/UL
WBC # BLD AUTO: 13.18 K/UL
WBC # BLD AUTO: 8.32 K/UL

## 2019-03-29 PROCEDURE — 85730 THROMBOPLASTIN TIME PARTIAL: CPT | Performed by: CLINICAL NURSE SPECIALIST

## 2019-03-29 PROCEDURE — 33946 ECMO/ECLS INITIATION VENOUS: CPT | Performed by: INTERNAL MEDICINE

## 2019-03-29 PROCEDURE — 85347 COAGULATION TIME ACTIVATED: CPT | Performed by: THORACIC SURGERY (CARDIOTHORACIC VASCULAR SURGERY)

## 2019-03-29 PROCEDURE — 93005 ELECTROCARDIOGRAM TRACING: CPT | Performed by: PHYSICIAN ASSISTANT

## 2019-03-29 PROCEDURE — 27200000 HC STERILE SUPPLY: Performed by: THORACIC SURGERY (CARDIOTHORACIC VASCULAR SURGERY)

## 2019-03-29 PROCEDURE — 82803 BLOOD GASES ANY COMBINATION: CPT

## 2019-03-29 PROCEDURE — 37000001 HC ANESTHESIA GENERAL: Performed by: THORACIC SURGERY (CARDIOTHORACIC VASCULAR SURGERY)

## 2019-03-29 PROCEDURE — 85027 COMPLETE CBC AUTOMATED: CPT | Performed by: THORACIC SURGERY (CARDIOTHORACIC VASCULAR SURGERY)

## 2019-03-29 PROCEDURE — 85384 FIBRINOGEN ACTIVITY: CPT | Performed by: ANESTHESIOLOGY

## 2019-03-29 PROCEDURE — 84100 ASSAY OF PHOSPHORUS: CPT | Performed by: PHYSICIAN ASSISTANT

## 2019-03-29 PROCEDURE — 85347 COAGULATION TIME ACTIVATED: CPT

## 2019-03-29 PROCEDURE — P9047 ALBUMIN (HUMAN), 25%, 50ML: HCPCS

## 2019-03-29 PROCEDURE — 36000010 HC CARDIO-PULMONARY BYPASS

## 2019-03-29 PROCEDURE — 63600000 HC DRUGS/DETAIL CODE: Performed by: PHYSICIAN ASSISTANT

## 2019-03-29 PROCEDURE — P9012 CRYOPRECIPITATE EACH UNIT: HCPCS

## 2019-03-29 PROCEDURE — 25000000 HC PHARMACY GENERAL: Performed by: THORACIC SURGERY (CARDIOTHORACIC VASCULAR SURGERY)

## 2019-03-29 PROCEDURE — 36000025 HC CELL SAVER PROCED

## 2019-03-29 PROCEDURE — 94002 VENT MGMT INPAT INIT DAY: CPT

## 2019-03-29 PROCEDURE — 20000000 HC ROOM AND CARE ICU

## 2019-03-29 PROCEDURE — 84132 ASSAY OF SERUM POTASSIUM: CPT | Performed by: PHYSICIAN ASSISTANT

## 2019-03-29 PROCEDURE — P9035 PLATELET PHERES LEUKOREDUCED: HCPCS

## 2019-03-29 PROCEDURE — 71045 X-RAY EXAM CHEST 1 VIEW: CPT

## 2019-03-29 PROCEDURE — 63600000 HC DRUGS/DETAIL CODE: Performed by: THORACIC SURGERY (CARDIOTHORACIC VASCULAR SURGERY)

## 2019-03-29 PROCEDURE — 93312 ECHO TRANSESOPHAGEAL: CPT | Performed by: ANESTHESIOLOGY

## 2019-03-29 PROCEDURE — 82810 BLOOD GASES O2 SAT ONLY: CPT | Performed by: THORACIC SURGERY (CARDIOTHORACIC VASCULAR SURGERY)

## 2019-03-29 PROCEDURE — 36415 COLL VENOUS BLD VENIPUNCTURE: CPT | Performed by: THORACIC SURGERY (CARDIOTHORACIC VASCULAR SURGERY)

## 2019-03-29 PROCEDURE — 83735 ASSAY OF MAGNESIUM: CPT | Performed by: PHYSICIAN ASSISTANT

## 2019-03-29 PROCEDURE — 99291 CRITICAL CARE FIRST HOUR: CPT | Mod: 25 | Performed by: ANESTHESIOLOGY

## 2019-03-29 PROCEDURE — 3E043XZ INTRODUCTION OF VASOPRESSOR INTO CENTRAL VEIN, PERCUTANEOUS APPROACH: ICD-10-PCS | Performed by: THORACIC SURGERY (CARDIOTHORACIC VASCULAR SURGERY)

## 2019-03-29 PROCEDURE — 63600000 HC DRUGS/DETAIL CODE

## 2019-03-29 PROCEDURE — 27200000 HC STERILE SUPPLY

## 2019-03-29 PROCEDURE — C1894 INTRO/SHEATH, NON-LASER: HCPCS | Performed by: THORACIC SURGERY (CARDIOTHORACIC VASCULAR SURGERY)

## 2019-03-29 PROCEDURE — 27800000 HC SUPPLY/IMPLANTS

## 2019-03-29 PROCEDURE — 02UG0JZ SUPPLEMENT MITRAL VALVE WITH SYNTHETIC SUBSTITUTE, OPEN APPROACH: ICD-10-PCS | Performed by: THORACIC SURGERY (CARDIOTHORACIC VASCULAR SURGERY)

## 2019-03-29 PROCEDURE — 25000000 HC PHARMACY GENERAL

## 2019-03-29 PROCEDURE — C1769 GUIDE WIRE: HCPCS | Performed by: THORACIC SURGERY (CARDIOTHORACIC VASCULAR SURGERY)

## 2019-03-29 PROCEDURE — 83051 HEMOGLOBIN PLASMA: CPT | Performed by: ANESTHESIOLOGY

## 2019-03-29 PROCEDURE — 85027 COMPLETE CBC AUTOMATED: CPT | Performed by: CLINICAL NURSE SPECIALIST

## 2019-03-29 PROCEDURE — 48000013 HC VENTRICULAR ASSIST, ECMO HOURLY

## 2019-03-29 PROCEDURE — 85300 ANTITHROMBIN III ACTIVITY: CPT | Performed by: THORACIC SURGERY (CARDIOTHORACIC VASCULAR SURGERY)

## 2019-03-29 PROCEDURE — 85384 FIBRINOGEN ACTIVITY: CPT | Performed by: THORACIC SURGERY (CARDIOTHORACIC VASCULAR SURGERY)

## 2019-03-29 PROCEDURE — P9016 RBC LEUKOCYTES REDUCED: HCPCS

## 2019-03-29 PROCEDURE — 36000016 HC OR LEVEL 6 EA ADDL MIN: Performed by: THORACIC SURGERY (CARDIOTHORACIC VASCULAR SURGERY)

## 2019-03-29 PROCEDURE — 27800000 HC SUPPLY/IMPLANTS: Performed by: THORACIC SURGERY (CARDIOTHORACIC VASCULAR SURGERY)

## 2019-03-29 PROCEDURE — 85730 THROMBOPLASTIN TIME PARTIAL: CPT | Performed by: PHYSICIAN ASSISTANT

## 2019-03-29 PROCEDURE — 36000006 HC OR LEVEL 6 INITIAL 30MIN: Performed by: THORACIC SURGERY (CARDIOTHORACIC VASCULAR SURGERY)

## 2019-03-29 PROCEDURE — 63600000 HC DRUGS/DETAIL CODE: Performed by: NURSE PRACTITIONER

## 2019-03-29 PROCEDURE — 85384 FIBRINOGEN ACTIVITY: CPT | Performed by: CLINICAL NURSE SPECIALIST

## 2019-03-29 PROCEDURE — 83036 HEMOGLOBIN GLYCOSYLATED A1C: CPT | Performed by: PHYSICIAN ASSISTANT

## 2019-03-29 PROCEDURE — 63600000 HC DRUGS/DETAIL CODE: Performed by: ANESTHESIOLOGY

## 2019-03-29 PROCEDURE — 63700000 HC SELF-ADMINISTRABLE DRUG: Performed by: NURSE PRACTITIONER

## 2019-03-29 PROCEDURE — B24BZZ4 ULTRASONOGRAPHY OF HEART WITH AORTA, TRANSESOPHAGEAL: ICD-10-PCS | Performed by: ANESTHESIOLOGY

## 2019-03-29 PROCEDURE — 33427 REPAIR OF MITRAL VALVE: CPT | Performed by: THORACIC SURGERY (CARDIOTHORACIC VASCULAR SURGERY)

## 2019-03-29 PROCEDURE — 25000000 HC PHARMACY GENERAL: Performed by: ANESTHESIOLOGY

## 2019-03-29 PROCEDURE — 5A1221Z PERFORMANCE OF CARDIAC OUTPUT, CONTINUOUS: ICD-10-PCS | Performed by: THORACIC SURGERY (CARDIOTHORACIC VASCULAR SURGERY)

## 2019-03-29 PROCEDURE — 25800000 HC PHARMACY IV SOLUTIONS

## 2019-03-29 PROCEDURE — 25000000 HC PHARMACY GENERAL: Performed by: PHYSICIAN ASSISTANT

## 2019-03-29 PROCEDURE — 83051 HEMOGLOBIN PLASMA: CPT | Performed by: CLINICAL NURSE SPECIALIST

## 2019-03-29 PROCEDURE — 85610 PROTHROMBIN TIME: CPT | Performed by: PHYSICIAN ASSISTANT

## 2019-03-29 PROCEDURE — 99292 CRITICAL CARE ADDL 30 MIN: CPT | Performed by: ANESTHESIOLOGY

## 2019-03-29 PROCEDURE — 85730 THROMBOPLASTIN TIME PARTIAL: CPT | Performed by: THORACIC SURGERY (CARDIOTHORACIC VASCULAR SURGERY)

## 2019-03-29 PROCEDURE — 36430 TRANSFUSION BLD/BLD COMPNT: CPT | Performed by: ANESTHESIOLOGY

## 2019-03-29 PROCEDURE — 85610 PROTHROMBIN TIME: CPT | Performed by: THORACIC SURGERY (CARDIOTHORACIC VASCULAR SURGERY)

## 2019-03-29 PROCEDURE — P9045 ALBUMIN (HUMAN), 5%, 250 ML: HCPCS | Performed by: PHYSICIAN ASSISTANT

## 2019-03-29 PROCEDURE — 25800000 HC PHARMACY IV SOLUTIONS: Performed by: ANESTHESIOLOGY

## 2019-03-29 PROCEDURE — 5A1522H EXTRACORPOREAL OXYGENATION, MEMBRANE, PERIPHERAL VENO-VENOUS: ICD-10-PCS | Performed by: INTERNAL MEDICINE

## 2019-03-29 PROCEDURE — 85027 COMPLETE CBC AUTOMATED: CPT | Performed by: PHYSICIAN ASSISTANT

## 2019-03-29 PROCEDURE — 33952 ECMO/ECLS INSJ PRPH CANNULA: CPT | Performed by: INTERNAL MEDICINE

## 2019-03-29 DEVICE — FIBRIN SEALANT TACHOSIL 4.8X4.8CM: Type: IMPLANTABLE DEVICE | Site: HEART | Status: FUNCTIONAL

## 2019-03-29 DEVICE — RING ANNULOPLASTY COSGROVE 34MM: Type: IMPLANTABLE DEVICE | Site: HEART | Status: FUNCTIONAL

## 2019-03-29 RX ORDER — HEPARIN SODIUM 1000 [USP'U]/ML
INJECTION, SOLUTION INTRAVENOUS; SUBCUTANEOUS AS NEEDED
Status: DISCONTINUED | OUTPATIENT
Start: 2019-03-29 | End: 2019-03-29 | Stop reason: HOSPADM

## 2019-03-29 RX ORDER — MEPERIDINE HYDROCHLORIDE 25 MG/ML
INJECTION INTRAMUSCULAR; INTRAVENOUS; SUBCUTANEOUS
Status: DISPENSED
Start: 2019-03-29 | End: 2019-03-30

## 2019-03-29 RX ORDER — MEPERIDINE HYDROCHLORIDE 25 MG/ML
12.5 INJECTION INTRAMUSCULAR; INTRAVENOUS; SUBCUTANEOUS ONCE
Status: COMPLETED | OUTPATIENT
Start: 2019-03-29 | End: 2019-03-29

## 2019-03-29 RX ORDER — CHLORHEXIDINE GLUCONATE ORAL RINSE 1.2 MG/ML
15 SOLUTION DENTAL ONCE
Status: COMPLETED | OUTPATIENT
Start: 2019-03-29 | End: 2019-03-29

## 2019-03-29 RX ORDER — SENNOSIDES 8.6 MG/1
1 TABLET ORAL 2 TIMES DAILY
Status: DISCONTINUED | OUTPATIENT
Start: 2019-03-29 | End: 2019-04-10

## 2019-03-29 RX ORDER — FENTANYL CITRATE 50 UG/ML
INJECTION, SOLUTION INTRAMUSCULAR; INTRAVENOUS AS NEEDED
Status: DISCONTINUED | OUTPATIENT
Start: 2019-03-29 | End: 2019-03-29 | Stop reason: SURG

## 2019-03-29 RX ORDER — HEPARIN SODIUM 1000 [USP'U]/ML
INJECTION, SOLUTION INTRAVENOUS; SUBCUTANEOUS AS NEEDED
Status: DISCONTINUED | OUTPATIENT
Start: 2019-03-29 | End: 2019-03-29 | Stop reason: SURG

## 2019-03-29 RX ORDER — DIPHENHYDRAMINE HCL 50 MG/ML
25 VIAL (ML) INJECTION EVERY 6 HOURS PRN
Status: DISCONTINUED | OUTPATIENT
Start: 2019-03-29 | End: 2019-04-10

## 2019-03-29 RX ORDER — PROPOFOL 10 MG/ML
INJECTION, EMULSION INTRAVENOUS CONTINUOUS PRN
Status: DISCONTINUED | OUTPATIENT
Start: 2019-03-29 | End: 2019-03-29 | Stop reason: SURG

## 2019-03-29 RX ORDER — OXYCODONE HYDROCHLORIDE 5 MG/1
5-10 TABLET ORAL EVERY 4 HOURS PRN
Status: DISCONTINUED | OUTPATIENT
Start: 2019-03-29 | End: 2019-03-29

## 2019-03-29 RX ORDER — ONDANSETRON HYDROCHLORIDE 2 MG/ML
4 INJECTION, SOLUTION INTRAVENOUS EVERY 8 HOURS PRN
Status: DISCONTINUED | OUTPATIENT
Start: 2019-03-29 | End: 2019-04-10

## 2019-03-29 RX ORDER — HYDROMORPHONE HYDROCHLORIDE 1 MG/ML
.25-.5 INJECTION, SOLUTION INTRAMUSCULAR; INTRAVENOUS; SUBCUTANEOUS
Status: DISCONTINUED | OUTPATIENT
Start: 2019-03-29 | End: 2019-03-29

## 2019-03-29 RX ORDER — NOREPINEPHRINE BITARTRATE 0.02 MG/ML
INJECTION, SOLUTION INTRAVENOUS CONTINUOUS PRN
Status: DISCONTINUED | OUTPATIENT
Start: 2019-03-29 | End: 2019-03-29 | Stop reason: SURG

## 2019-03-29 RX ORDER — DIPHENHYDRAMINE HCL 25 MG
25 CAPSULE ORAL EVERY 6 HOURS PRN
Status: DISCONTINUED | OUTPATIENT
Start: 2019-03-29 | End: 2019-04-11 | Stop reason: HOSPADM

## 2019-03-29 RX ORDER — LANOLIN ALCOHOL/MO/W.PET/CERES
400 CREAM (GRAM) TOPICAL 2 TIMES DAILY
Status: DISCONTINUED | OUTPATIENT
Start: 2019-04-02 | End: 2019-04-11 | Stop reason: HOSPADM

## 2019-03-29 RX ORDER — ROPIVACAINE HYDROCHLORIDE 2 MG/ML
5 INJECTION, SOLUTION EPIDURAL; INFILTRATION; PERINEURAL CONTINUOUS
Status: DISCONTINUED | OUTPATIENT
Start: 2019-03-29 | End: 2019-03-29

## 2019-03-29 RX ORDER — PROPOFOL 10 MG/ML
10-80 INJECTION, EMULSION INTRAVENOUS CONTINUOUS
Status: DISCONTINUED | OUTPATIENT
Start: 2019-03-29 | End: 2019-03-30

## 2019-03-29 RX ORDER — COLCHICINE 0.6 MG/1
0.6 TABLET ORAL DAILY
Status: DISCONTINUED | OUTPATIENT
Start: 2019-03-29 | End: 2019-04-11 | Stop reason: HOSPADM

## 2019-03-29 RX ORDER — SODIUM BICARBONATE 1 MEQ/ML
SYRINGE (ML) INTRAVENOUS AS NEEDED
Status: DISCONTINUED | OUTPATIENT
Start: 2019-03-29 | End: 2019-03-29 | Stop reason: HOSPADM

## 2019-03-29 RX ORDER — SENNOSIDES 8.6 MG/1
1 TABLET ORAL 2 TIMES DAILY PRN
Status: DISCONTINUED | OUTPATIENT
Start: 2019-03-29 | End: 2019-04-11 | Stop reason: HOSPADM

## 2019-03-29 RX ORDER — FAMOTIDINE 20 MG/1
20 TABLET, FILM COATED ORAL 2 TIMES DAILY
Status: DISCONTINUED | OUTPATIENT
Start: 2019-03-29 | End: 2019-04-11 | Stop reason: HOSPADM

## 2019-03-29 RX ORDER — ROSUVASTATIN CALCIUM 10 MG/1
10 TABLET, COATED ORAL
Status: DISCONTINUED | OUTPATIENT
Start: 2019-03-29 | End: 2019-04-11 | Stop reason: HOSPADM

## 2019-03-29 RX ORDER — PROPOFOL 10 MG/ML
INJECTION, EMULSION INTRAVENOUS AS NEEDED
Status: DISCONTINUED | OUTPATIENT
Start: 2019-03-29 | End: 2019-03-29 | Stop reason: SURG

## 2019-03-29 RX ORDER — ADENOSINE 3 MG/ML
30 INJECTION, SOLUTION INTRAVENOUS ONCE
Status: DISPENSED | OUTPATIENT
Start: 2019-03-29 | End: 2019-03-29

## 2019-03-29 RX ORDER — ONDANSETRON 4 MG/1
4 TABLET, ORALLY DISINTEGRATING ORAL EVERY 8 HOURS PRN
Status: DISCONTINUED | OUTPATIENT
Start: 2019-03-29 | End: 2019-04-11 | Stop reason: HOSPADM

## 2019-03-29 RX ORDER — ACETAMINOPHEN 650 MG/20.3ML
650 LIQUID ORAL EVERY 4 HOURS PRN
Status: DISCONTINUED | OUTPATIENT
Start: 2019-03-29 | End: 2019-03-30

## 2019-03-29 RX ORDER — PROTAMINE SULFATE 10 MG/ML
INJECTION, SOLUTION INTRAVENOUS AS NEEDED
Status: DISCONTINUED | OUTPATIENT
Start: 2019-03-29 | End: 2019-03-29 | Stop reason: SURG

## 2019-03-29 RX ORDER — ADENOSINE 3 MG/ML
30 INJECTION, SOLUTION INTRAVENOUS ONCE
Status: DISCONTINUED | OUTPATIENT
Start: 2019-03-29 | End: 2019-03-29

## 2019-03-29 RX ORDER — DOBUTAMINE HYDROCHLORIDE 200 MG/100ML
3 INJECTION INTRAVENOUS CONTINUOUS
Status: DISCONTINUED | OUTPATIENT
Start: 2019-03-29 | End: 2019-04-04

## 2019-03-29 RX ORDER — ACETAMINOPHEN 650 MG/1
650 SUPPOSITORY RECTAL EVERY 4 HOURS PRN
Status: DISCONTINUED | OUTPATIENT
Start: 2019-03-29 | End: 2019-03-30

## 2019-03-29 RX ORDER — ATROPINE SULFATE 0.1 MG/ML
0.5 INJECTION INTRAVENOUS EVERY 5 MIN PRN
Status: DISCONTINUED | OUTPATIENT
Start: 2019-03-29 | End: 2019-04-10

## 2019-03-29 RX ORDER — SODIUM CHLORIDE 9 MG/ML
INJECTION, SOLUTION INTRAVENOUS CONTINUOUS
Status: DISCONTINUED | OUTPATIENT
Start: 2019-03-29 | End: 2019-04-10

## 2019-03-29 RX ORDER — CHLORHEXIDINE GLUCONATE ORAL RINSE 1.2 MG/ML
15 SOLUTION DENTAL 2 TIMES DAILY
Status: DISCONTINUED | OUTPATIENT
Start: 2019-03-29 | End: 2019-03-29 | Stop reason: SDUPTHER

## 2019-03-29 RX ORDER — ASPIRIN 81 MG/1
81 TABLET ORAL DAILY
Status: DISCONTINUED | OUTPATIENT
Start: 2019-03-29 | End: 2019-04-11 | Stop reason: HOSPADM

## 2019-03-29 RX ORDER — BISACODYL 10 MG/1
10 SUPPOSITORY RECTAL DAILY PRN
Status: DISCONTINUED | OUTPATIENT
Start: 2019-03-29 | End: 2019-04-10

## 2019-03-29 RX ORDER — MEPERIDINE HYDROCHLORIDE 25 MG/ML
INJECTION INTRAMUSCULAR; INTRAVENOUS; SUBCUTANEOUS
Status: COMPLETED
Start: 2019-03-29 | End: 2019-03-29

## 2019-03-29 RX ORDER — ALUMINUM HYDROXIDE, MAGNESIUM HYDROXIDE, AND SIMETHICONE 1200; 120; 1200 MG/30ML; MG/30ML; MG/30ML
30 SUSPENSION ORAL EVERY 4 HOURS PRN
Status: DISCONTINUED | OUTPATIENT
Start: 2019-03-29 | End: 2019-04-11 | Stop reason: HOSPADM

## 2019-03-29 RX ORDER — FAMOTIDINE 10 MG/ML
20 INJECTION INTRAVENOUS 2 TIMES DAILY
Status: DISCONTINUED | OUTPATIENT
Start: 2019-03-29 | End: 2019-04-02

## 2019-03-29 RX ORDER — CHLORHEXIDINE GLUCONATE ORAL RINSE 1.2 MG/ML
15 SOLUTION DENTAL
Status: DISCONTINUED | OUTPATIENT
Start: 2019-03-29 | End: 2019-04-11 | Stop reason: HOSPADM

## 2019-03-29 RX ORDER — ADENOSINE 3 MG/ML
INJECTION, SOLUTION INTRAVENOUS AS NEEDED
Status: DISCONTINUED | OUTPATIENT
Start: 2019-03-29 | End: 2019-03-29 | Stop reason: HOSPADM

## 2019-03-29 RX ORDER — CALCIUM CHLORIDE INJECTION 100 MG/ML
INJECTION, SOLUTION INTRAVENOUS AS NEEDED
Status: DISCONTINUED | OUTPATIENT
Start: 2019-03-29 | End: 2019-03-29 | Stop reason: HOSPADM

## 2019-03-29 RX ORDER — KETOROLAC TROMETHAMINE 30 MG/ML
30 INJECTION, SOLUTION INTRAMUSCULAR; INTRAVENOUS EVERY 6 HOURS PRN
Status: COMPLETED | OUTPATIENT
Start: 2019-03-29 | End: 2019-04-03

## 2019-03-29 RX ORDER — ACETAMINOPHEN 325 MG/1
650 TABLET ORAL EVERY 4 HOURS PRN
Status: DISCONTINUED | OUTPATIENT
Start: 2019-03-29 | End: 2019-03-30

## 2019-03-29 RX ORDER — FUROSEMIDE 40 MG/1
40 TABLET ORAL DAILY
Status: DISCONTINUED | OUTPATIENT
Start: 2019-04-02 | End: 2019-03-30

## 2019-03-29 RX ORDER — DOCUSATE SODIUM 100 MG/1
100 CAPSULE, LIQUID FILLED ORAL 2 TIMES DAILY
Status: DISCONTINUED | OUTPATIENT
Start: 2019-03-29 | End: 2019-04-11 | Stop reason: HOSPADM

## 2019-03-29 RX ORDER — AMINOCAPROIC ACID 250 MG/ML
INJECTION, SOLUTION INTRAVENOUS AS NEEDED
Status: DISCONTINUED | OUTPATIENT
Start: 2019-03-29 | End: 2019-03-29 | Stop reason: SURG

## 2019-03-29 RX ORDER — SODIUM CHLORIDE 9 MG/ML
INJECTION, SOLUTION INTRAVENOUS CONTINUOUS PRN
Status: DISCONTINUED | OUTPATIENT
Start: 2019-03-29 | End: 2019-03-29 | Stop reason: SURG

## 2019-03-29 RX ORDER — CALCIUM CHLORIDE INJECTION 100 MG/ML
INJECTION, SOLUTION INTRAVENOUS AS NEEDED
Status: DISCONTINUED | OUTPATIENT
Start: 2019-03-29 | End: 2019-03-29 | Stop reason: SURG

## 2019-03-29 RX ORDER — FENTANYL CITRATE/PF 100MCG/2ML
0-200 SYRINGE (ML) INTRAVENOUS CONTINUOUS
Status: DISCONTINUED | OUTPATIENT
Start: 2019-03-29 | End: 2019-03-30

## 2019-03-29 RX ORDER — DOBUTAMINE HYDROCHLORIDE 200 MG/100ML
INJECTION INTRAVENOUS CONTINUOUS PRN
Status: DISCONTINUED | OUTPATIENT
Start: 2019-03-29 | End: 2019-03-29 | Stop reason: SURG

## 2019-03-29 RX ORDER — MEPERIDINE HYDROCHLORIDE 25 MG/ML
12.5 INJECTION INTRAMUSCULAR; INTRAVENOUS; SUBCUTANEOUS AS NEEDED
Status: DISCONTINUED | OUTPATIENT
Start: 2019-03-29 | End: 2019-03-29

## 2019-03-29 RX ORDER — MIDAZOLAM HYDROCHLORIDE 2 MG/2ML
INJECTION, SOLUTION INTRAMUSCULAR; INTRAVENOUS AS NEEDED
Status: DISCONTINUED | OUTPATIENT
Start: 2019-03-29 | End: 2019-03-29 | Stop reason: SURG

## 2019-03-29 RX ORDER — POTASSIUM CHLORIDE 750 MG/1
20 TABLET, FILM COATED, EXTENDED RELEASE ORAL 2 TIMES DAILY
Status: DISCONTINUED | OUTPATIENT
Start: 2019-03-29 | End: 2019-04-11 | Stop reason: HOSPADM

## 2019-03-29 RX ORDER — CEFAZOLIN SODIUM/WATER 1 G/10 ML
SYRINGE (ML) INTRAVENOUS AS NEEDED
Status: DISCONTINUED | OUTPATIENT
Start: 2019-03-29 | End: 2019-03-29 | Stop reason: HOSPADM

## 2019-03-29 RX ORDER — BISACODYL 10 MG/1
10 SUPPOSITORY RECTAL AS NEEDED
Status: ACTIVE | OUTPATIENT
Start: 2019-04-01 | End: 2019-04-02

## 2019-03-29 RX ORDER — SODIUM BICARBONATE 1 MEQ/ML
45-90 SYRINGE (ML) INTRAVENOUS AS NEEDED
Status: DISCONTINUED | OUTPATIENT
Start: 2019-03-29 | End: 2019-04-10

## 2019-03-29 RX ORDER — ROPIVACAINE HYDROCHLORIDE 5 MG/ML
INJECTION, SOLUTION EPIDURAL; INFILTRATION; PERINEURAL AS NEEDED
Status: DISCONTINUED | OUTPATIENT
Start: 2019-03-29 | End: 2019-03-29 | Stop reason: HOSPADM

## 2019-03-29 RX ORDER — DOCUSATE SODIUM 100 MG/1
100 CAPSULE, LIQUID FILLED ORAL 2 TIMES DAILY
Status: DISCONTINUED | OUTPATIENT
Start: 2019-03-29 | End: 2019-03-29 | Stop reason: SDUPTHER

## 2019-03-29 RX ORDER — METOPROLOL SUCCINATE 50 MG/1
50 TABLET, EXTENDED RELEASE ORAL NIGHTLY
Status: DISCONTINUED | OUTPATIENT
Start: 2019-03-29 | End: 2019-03-30

## 2019-03-29 RX ORDER — ALBUMIN HUMAN 250 G/1000ML
SOLUTION INTRAVENOUS AS NEEDED
Status: DISCONTINUED | OUTPATIENT
Start: 2019-03-29 | End: 2019-03-29 | Stop reason: HOSPADM

## 2019-03-29 RX ORDER — POTASSIUM CHLORIDE 14.9 MG/ML
20 INJECTION INTRAVENOUS EVERY 8 HOURS PRN
Status: DISCONTINUED | OUTPATIENT
Start: 2019-03-29 | End: 2019-04-10

## 2019-03-29 RX ORDER — FUROSEMIDE 40 MG/1
40 TABLET ORAL
Status: DISCONTINUED | OUTPATIENT
Start: 2019-03-29 | End: 2019-03-30

## 2019-03-29 RX ORDER — DEXTROSE 50 % IN WATER (D50W) INTRAVENOUS SYRINGE
10-30 AS NEEDED
Status: DISCONTINUED | OUTPATIENT
Start: 2019-03-29 | End: 2019-04-11 | Stop reason: HOSPADM

## 2019-03-29 RX ORDER — ROCURONIUM BROMIDE 10 MG/ML
INJECTION, SOLUTION INTRAVENOUS AS NEEDED
Status: DISCONTINUED | OUTPATIENT
Start: 2019-03-29 | End: 2019-03-29 | Stop reason: SURG

## 2019-03-29 RX ORDER — ALBUMIN HUMAN 50 G/1000ML
500 SOLUTION INTRAVENOUS AS NEEDED
Status: DISCONTINUED | OUTPATIENT
Start: 2019-03-29 | End: 2019-04-10

## 2019-03-29 RX ORDER — METOPROLOL SUCCINATE 25 MG/1
25 TABLET, EXTENDED RELEASE ORAL NIGHTLY
Status: DISCONTINUED | OUTPATIENT
Start: 2019-03-29 | End: 2019-03-30

## 2019-03-29 RX ADMIN — INSULIN HUMAN 2 UNITS/HR: 100 INJECTION, SOLUTION PARENTERAL at 11:19

## 2019-03-29 RX ADMIN — SODIUM BICARBONATE 50 MEQ: 84 INJECTION, SOLUTION INTRAVENOUS at 08:55

## 2019-03-29 RX ADMIN — NOREPINEPHRINE BITARTRATE 2 MCG/MIN: at 11:00

## 2019-03-29 RX ADMIN — SODIUM CHLORIDE: 9 INJECTION, SOLUTION INTRAVENOUS at 06:43

## 2019-03-29 RX ADMIN — HEPARIN SODIUM 100000 UNITS: 1000 INJECTION, SOLUTION INTRAVENOUS; SUBCUTANEOUS at 11:20

## 2019-03-29 RX ADMIN — Medication 2 G: at 22:59

## 2019-03-29 RX ADMIN — SODIUM CHLORIDE, SODIUM GLUCONATE, SODIUM ACETATE, POTASSIUM CHLORIDE AND MAGNESIUM CHLORIDE 1000 ML: 526; 502; 368; 37; 30 INJECTION, SOLUTION INTRAVENOUS at 13:30

## 2019-03-29 RX ADMIN — ALBUMIN (HUMAN) 100 ML: 0.25 INJECTION, SOLUTION INTRAVENOUS at 08:55

## 2019-03-29 RX ADMIN — Medication 25 MCG/HR: at 21:36

## 2019-03-29 RX ADMIN — Medication 1 G: at 10:33

## 2019-03-29 RX ADMIN — CALCIUM CHLORIDE 1 G: 100 INJECTION, SOLUTION INTRAVENOUS at 13:50

## 2019-03-29 RX ADMIN — HEPARIN SODIUM 100000 UNITS: 1000 INJECTION, SOLUTION INTRAVENOUS; SUBCUTANEOUS at 08:55

## 2019-03-29 RX ADMIN — SODIUM CHLORIDE 1 G/HR: 900 INJECTION, SOLUTION INTRAVENOUS at 08:11

## 2019-03-29 RX ADMIN — POTASSIUM CHLORIDE 20 MEQ: 200 INJECTION, SOLUTION INTRAVENOUS at 23:38

## 2019-03-29 RX ADMIN — ROCURONIUM BROMIDE 50 MG: 10 INJECTION, SOLUTION INTRAVENOUS at 08:32

## 2019-03-29 RX ADMIN — CHLORHEXIDINE GLUCONATE 15 ML: 1.2 RINSE ORAL at 05:52

## 2019-03-29 RX ADMIN — Medication: at 10:43

## 2019-03-29 RX ADMIN — Medication: at 09:31

## 2019-03-29 RX ADMIN — CALCIUM CHLORIDE 1 G: 100 INJECTION, SOLUTION INTRAVENOUS at 15:56

## 2019-03-29 RX ADMIN — ROCURONIUM BROMIDE 100 MG: 10 INJECTION, SOLUTION INTRAVENOUS at 07:05

## 2019-03-29 RX ADMIN — CEFAZOLIN SODIUM 1 G: 100 INJECTION, SOLUTION INTRAVENOUS at 08:55

## 2019-03-29 RX ADMIN — POTASSIUM CHLORIDE 20 MEQ: 200 INJECTION, SOLUTION INTRAVENOUS at 19:33

## 2019-03-29 RX ADMIN — PROTAMINE SULFATE 260 MG: 10 INJECTION, SOLUTION INTRAVENOUS at 13:29

## 2019-03-29 RX ADMIN — AMINOCAPROIC ACID 5 G: 250 INJECTION, SOLUTION INTRAVENOUS at 08:00

## 2019-03-29 RX ADMIN — MIDAZOLAM HYDROCHLORIDE 2 MG/HR: 1 INJECTION, SOLUTION INTRAMUSCULAR; INTRAVENOUS at 21:40

## 2019-03-29 RX ADMIN — SODIUM CHLORIDE, SODIUM GLUCONATE, SODIUM ACETATE, POTASSIUM CHLORIDE AND MAGNESIUM CHLORIDE 950 ML: 526; 502; 368; 37; 30 INJECTION, SOLUTION INTRAVENOUS at 08:55

## 2019-03-29 RX ADMIN — PROPOFOL 50 MCG/KG/MIN: 10 INJECTION, EMULSION INTRAVENOUS at 22:30

## 2019-03-29 RX ADMIN — ROCURONIUM BROMIDE 30 MG: 10 INJECTION, SOLUTION INTRAVENOUS at 11:16

## 2019-03-29 RX ADMIN — Medication 2 G: at 07:32

## 2019-03-29 RX ADMIN — VASOPRESSIN 0.04 UNITS/MIN: 20 INJECTION INTRAVENOUS at 11:54

## 2019-03-29 RX ADMIN — MEPERIDINE HYDROCHLORIDE 12.5 MG: 25 INJECTION INTRAMUSCULAR; INTRAVENOUS; SUBCUTANEOUS at 21:28

## 2019-03-29 RX ADMIN — ROCURONIUM BROMIDE 50 MG: 10 INJECTION, SOLUTION INTRAVENOUS at 13:54

## 2019-03-29 RX ADMIN — PROPOFOL 150 MG: 10 INJECTION, EMULSION INTRAVENOUS at 07:03

## 2019-03-29 RX ADMIN — MEPERIDINE HYDROCHLORIDE 12.5 MG: 25 INJECTION INTRAMUSCULAR; INTRAVENOUS; SUBCUTANEOUS at 17:27

## 2019-03-29 RX ADMIN — MIDAZOLAM HYDROCHLORIDE 2 MG: 1 INJECTION, SOLUTION INTRAMUSCULAR; INTRAVENOUS at 11:09

## 2019-03-29 RX ADMIN — Medication 1 G: at 13:33

## 2019-03-29 RX ADMIN — FENTANYL CITRATE 250 MCG: 50 INJECTION, SOLUTION INTRAMUSCULAR; INTRAVENOUS at 07:45

## 2019-03-29 RX ADMIN — CALCIUM CHLORIDE 1 G: 100 INJECTION, SOLUTION INTRAVENOUS at 11:06

## 2019-03-29 RX ADMIN — MIDAZOLAM HYDROCHLORIDE 1 MG: 1 INJECTION, SOLUTION INTRAMUSCULAR; INTRAVENOUS at 07:03

## 2019-03-29 RX ADMIN — SODIUM CHLORIDE: 9 INJECTION, SOLUTION INTRAVENOUS at 07:32

## 2019-03-29 RX ADMIN — ALBUMIN (HUMAN) 500 ML: 12.5 SOLUTION INTRAVENOUS at 21:30

## 2019-03-29 RX ADMIN — MIDAZOLAM HYDROCHLORIDE 2 MG: 1 INJECTION, SOLUTION INTRAMUSCULAR; INTRAVENOUS at 12:00

## 2019-03-29 RX ADMIN — HEPARIN SODIUM 47000 UNITS: 1000 INJECTION, SOLUTION INTRAVENOUS; SUBCUTANEOUS at 08:45

## 2019-03-29 RX ADMIN — FAMOTIDINE 20 MG: 10 INJECTION INTRAVENOUS at 20:50

## 2019-03-29 RX ADMIN — FENTANYL CITRATE 250 MCG: 50 INJECTION, SOLUTION INTRAMUSCULAR; INTRAVENOUS at 07:03

## 2019-03-29 RX ADMIN — MIDAZOLAM HYDROCHLORIDE 1 MG: 1 INJECTION, SOLUTION INTRAMUSCULAR; INTRAVENOUS at 06:50

## 2019-03-29 RX ADMIN — DOBUTAMINE IN DEXTROSE 2 MCG/KG/MIN: 200 INJECTION, SOLUTION INTRAVENOUS at 11:13

## 2019-03-29 RX ADMIN — PROPOFOL 50 MCG/KG/MIN: 10 INJECTION, EMULSION INTRAVENOUS at 13:05

## 2019-03-29 RX ADMIN — MEPERIDINE HYDROCHLORIDE 12.5 MG: 25 INJECTION INTRAMUSCULAR; INTRAVENOUS; SUBCUTANEOUS at 21:25

## 2019-03-29 ASSESSMENT — NEW YORK HEART ASSOCIATION (NYHA) CLASSIFICATION: NYHA FUNCTIONAL CLASS: III

## 2019-03-29 ASSESSMENT — PAIN SCALES - GENERAL: PAIN_LEVEL: 0

## 2019-03-29 NOTE — ANESTHESIA PROCEDURE NOTES
Central Venous Line:    Date/Time: 3/29/2019 7:30 AM            A cardiac line was placed in the OR for the following indication(s): at surgeon's request.  Line Type: Endopulmonary Vent    Sterility preparation included the following: provider hand hygiene performed prior to insertion and all 5 sterile barriers used (gloves, gown, cap, mask, large sterile drape) during insertion.  Timeout performed.    The patient was placed in Trendelenburg position. Right internal jugular vein was prepped.    The site was prepped with Chlorhexidine.  A 9 Fr (size), 10 (length), introducer double lumen was placed.  This catheter was not an oximetric catheter.    During the procedure, the following specific steps were taken: target vein identified, needle advanced into vein and blood aspirated and guidewire advanced into vein.  Seldinger technique used      Procedure performed using ultrasound guidance and surface landmarks.  Sterile gel and probe cover used in ultrasound-guided central venous catheter insertion.  Image captured and stored.            The PAC placement was confirmed by pressure tracing changes and SAURABH          Post insertion care included: all ports aspirated, all ports flushed easily, guidewire removed intact, Biopatch applied, line sutured in place and dressing applied.    During the procedure the patient experienced: patient tolerated procedure well with no complications.                    Staffing  Anesthesiologist: DIEGO JEFFERY  Performed: anesthesiologist

## 2019-03-29 NOTE — ANESTHESIA POSTPROCEDURE EVALUATION
Patient: Wesley Fields    Procedure Summary     Date:  03/29/19 Room / Location:  [facility] OR 4 / [facility]    Anesthesia Start:  0643 Anesthesia Stop:  1730    Procedure:  MITRAL VALVE  REPAIR  MINIMALLY INVASIVE, WITH INSERTION OF PROTEC CATH (N/A Chest) Diagnosis:       Mitral valve insufficiency, unspecified etiology      (Mitral valve insufficiency, unspecified etiology [I34.0])    Surgeon:  Georgi Pepe MD Responsible Provider:  Radha Salmeron MD    Anesthesia Type:  general ASA Status:  4          Anesthesia Type: general  PACU Vitals  3/29/2019 1709 - 3/29/2019 1740      3/29/2019 1720 3/29/2019 1725 3/29/2019 1730 3/29/2019 1735    Arterial Line BP: 108/62 105/52 (!)  122/39 102/56    Pulse: 72 71 69 70    Resp: 14 (!)  0 18 18    SpO2: 100 % 100 % 100 % 100 %            Anesthesia Post Evaluation    Pain score: 0  Mode of pain management: IV medication  Patient location during evaluation: ICU  Patient participation: complete - patient cannot participate  Cardiovascular status: acceptable  Airway Patency: adequate  Respiratory status: ETT  Hydration status: stable  Anesthetic complications: no  Comments: Transported to the CTICU intubated, on 100% O2 and an oxygenator via protek duo cannula. ABG obtained and is satisfactory. Signout given to the ICU team. All questions answered. NMB reversal per the ICU team.

## 2019-03-29 NOTE — OP NOTE
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)   • Mitral regurgitation   • Mitral valve insufficiency   • CHF (congestive heart failure), NYHA class III, chronic, diastolic (CMS/HCC) (HCC)     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)    • Radiation therapy induced brain necrosis     last steroid 1/2019       POSTOPERATIVE DIAGNOSES:  Patient Active Problem List   Diagnosis   • HTN (hypertension)   • Hyperlipidemia   • 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)   • Mitral regurgitation   • Mitral valve insufficiency   • CHF (congestive heart failure), NYHA class III, chronic, diastolic (CMS/HCC) (HCC)     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  A right groin crease incision was performed exposing the femoral vessels which were free of disease.  Small lateral thoracotomy was performed through the fourth intercostal space on the right exposing the pericardium.  Extracorporeal circulation was established via the femoral vessels for a period of 242 at 32°C.  The pericardium was opened 2-1/2 cm anterior to the phrenic nerve on the right.  1250 milliliters of [place] cardioplegia solution was given for myocardial protection followed by 550 mL subsequently.  The left atrium was entereda the interatrial groove on the right exposing the mitral valve mitral valve had a dilated annulus.  vi There were ruptured chords on the  middle scallop of theleaflet.  This was repaired by placing 2 sets of dora-chords from each of the papillary muscles to the posterior leaflet.  A number 34 Greco annuloplasty band was placed on the mitral annulus with interrupted polyester sutures.  The valve was tested by inflating the ventricle with saline solution.  It was found to be completely competent.  Left atrium was closed with continuous 4-0 polypropylene sutures.  The heart was de-aired with a ventilation aorta.  De-airing was confirmed using transesophageal echo.  The aortic clamp was removed after 90 minutes.  A period of time was allowed for the heart to recover the patient's temperature was returned to normal cardio pulmonary bypass was discontinued.  Patient developed ST elevation in the inferior leads noted.  It was noted that the right ventricle was hypokinetic.  Because of this was elected to place a Protek Duo.  Dr. Victoria and Dr Clemons placed the device.  Patient responded well to cardio point CPB  was discontinued.  The femoral cannulas were removed.  Femoral vessels were repaired.  Protamine was given to counteract the heparin.  Intercostal blocks were performed with a cryoprobe.  Willi drains  were used for intercostal drainage.  The ribs were  approximated with doubled number 1 PDS.  The chest wound was closed in anatomic layers with Vicryl in the subcutaneous tissue and Vicryl and Dermabond in the skin.  The groin wound was closed in anatomic layers using Vicryl in the subcutaneous tissue and Monocryl and Dermabond skin.  Postop transesophageal echo revealed a completely competent mitral valve with no systolic anterior motion.  The patient was returned to the cardiothoracic intensive care unit.

## 2019-03-29 NOTE — Clinical Note
The right groin, bilateral radials and right chest was clipped, marked  and prepped with ChloraPrep. The patient was draped in a sterile fashion after allowing for the recommended dry time.

## 2019-03-29 NOTE — BRIEF OP NOTE
MITRAL VALVE  REPAIR  MINIMALLY INVASIVE, WITH INSERTION OF PROTEC CATH Procedure Note    Procedure:    MITRAL VALVE  REPAIR  MINIMALLY INVASIVE, WITH INSERTION OF PROTEC CATH  CPT(R) Code:  77489 - MT MITRALPLASTY W PROSTHETIC RING      Pre-op Diagnosis     * Mitral valve insufficiency, unspecified etiology [I34.0]       Post-op Diagnosis     * Mitral valve insufficiency, unspecified etiology [I34.0]    Surgeon(s) and Role:     * Michell Kirkland PA C - Assisting     * Georgi Pepe MD - Primary     * Juan M Victoria, DO - Assisting     * Juan M Victoria, DO - Assisting    Anesthesia: General    Staff:   Circulator: Ninfa Fortune RN; Shreya Morgan RN  Physician Assistant: Casper Hoyos PA C  Scrub Person: Sydney White RN; Berhane Sarmiento RN; Romero Kenney TECH    Procedure Details   1 HeartPort Mitral Valve Repair (w/ #34mm Annuloplasty Band and NeoChord x 2)  2. Protek Duo Placement via Right Subclavian Vein (placed under fluoroscopy)   3. Cannulation via right femoral artery and right femoral vein (via right groin cutdown)    Transfused 4 PRBCs, 4 SD Plts, and 20 Cryo intraoperatively.  Transported to CTICU intubated on gtts of Amicazr (@1), Vaso (@0.04), Dobutamine (@5), Propofol, and Insulin.    Estimated Blood Loss: No blood loss documented.    Specimens:                  Order Name Source Comment Collection Info Order Time   REPEAT ABO/RH (TYPE CHECK) Blood, Venous  Collected By: Phuong Damian RN 3/29/2019  5:49 AM   CBC Blood, Arterial   Pre-op diagnosis:Mitral valve insufficiency, unspecified etiology [I34.0] Collected By: Georgi Pepe MD 3/29/2019  7:00 AM   ANTITHROMBIN III ACTIVITY Blood, Arterial   Pre-op diagnosis:Mitral valve insufficiency, unspecified etiology [I34.0] Collected By: Georgi Pepe MD 3/29/2019  7:00 AM   FIBRINOGEN Blood, Arterial   Pre-op diagnosis:Mitral valve insufficiency, unspecified etiology [I34.0] Collected By: Georgi Pepe MD 3/29/2019   7:00 AM   REPEAT ABO/RH (TYPE CHECK) Blood, Arterial   Pre-op diagnosis:Mitral valve insufficiency, unspecified etiology [I34.0] Collected By: Georgi Pepe MD 3/29/2019  7:00 AM   CBC Blood, Arterial   Pre-op diagnosis:Mitral valve insufficiency, unspecified etiology [I34.0] Collected By: Georgi Pepe MD 3/29/2019 10:38 AM   FIBRINOGEN Blood, Arterial   Pre-op diagnosis:Mitral valve insufficiency, unspecified etiology [I34.0] Collected By: Georgi Pepe MD 3/29/2019 10:38 AM   TYPE AND SCREEN Blood, Venous   3/29/2019 11:59 AM   CBC Blood, Arterial   Pre-op diagnosis:Mitral valve insufficiency, unspecified etiology [I34.0] Collected By: Georgi Pepe MD 3/29/2019  2:28 PM   FIBRINOGEN Blood, Arterial   Pre-op diagnosis:Mitral valve insufficiency, unspecified etiology [I34.0] Collected By: Georgi Pepe MD 3/29/2019  2:28 PM   PROTIME-INR Blood, Arterial   Pre-op diagnosis:Mitral valve insufficiency, unspecified etiology [I34.0] Collected By: Georgi Pepe MD 3/29/2019  2:28 PM   PTT Blood, Arterial   Pre-op diagnosis:Mitral valve insufficiency, unspecified etiology [I34.0] Collected By: Georgi Pepe MD 3/29/2019  2:28 PM   PREPARE SINGLE DONOR PLATELETS    PreOp for Procedure  3/29/2019  5:48 AM    Transfusion indications  Pre-Op, major surgery with bleeding risk       Has consent been obtained?  Yes      PREPARE RBC    Pre-op for Procedure  3/29/2019  5:48 AM    Transfusion indications  Pre-OP major surgery with bleeding risk       Has consent been obtained?  Yes      PREPARE RBC    3/29/2019 11:59 AM    Transfusion indications  Acute Bleeding       Has consent been obtained?  Yes      PREPARE SINGLE DONOR PLATELETS    3/29/2019 12:14 PM    Transfusion indications  Planned Cardiovascular Surgery       Has consent been obtained?  Yes      PREPARE CRYOPRECIPITATE- UNITS ARE POOLED (1 UNIT=POOL OF 5; 2 UNITS=POOL OF 10) Blood, Venous   3/29/2019 12:14 PM    Transfusion indications   Dysfibrinogenemia and bleeding or planned operative procedure       Has consent been obtained?  Yes       Donor Source  2 Unit/Bag = Pool of 10      PREPARE CRYOPRECIPITATE- UNITS ARE POOLED (1 UNIT=POOL OF 5; 2 UNITS=POOL OF 10) Blood, Venous   3/29/2019  2:14 PM    Transfusion indications  Dysfibrinogenemia and bleeding or planned operative procedure       Has consent been obtained?  Yes      PREPARE SINGLE DONOR PLATELETS    3/29/2019  2:34 PM    Transfusion indications  Planned Cardiovascular Surgery       Has consent been obtained?  Yes            Drains: CT x 2 (Right Pleural/Mediastinal)    Chest Tube 1 Right Mediastinal  (Active)       Chest Tube 2 Right Pleural  (Active)       Urethral Catheter Temperature probe 16 Fr (Active)       Implants:   Implant Name Type Inv. Item Serial No.  Lot No. LRB No. Used   RING ANNULOPLASTY DAVE 34MM - [id number] - [id number] Annuloplasty ring RING ANNULOPLASTY DAVE 34MM [id number] [employer]  N/A 1   Protek Duo    CARDIACASSIST   1   FIBRIN SEALANT TACHOSIL 4.8X4.8CM - [id number]   FIBRIN SEALANT TACHOSIL 4.8X4.8CM   [employer] [id number] N/A 1              Complications:  None; patient tolerated the procedure well.           Disposition: ICU - intubated and hemodynamically stable.           Condition: stable    Georgi Pepe MD  Phone Number: [phone]

## 2019-03-29 NOTE — OR SURGEON
Pre-Procedure patient identification:  I am the primary operating surgeon/proceduralist and I have identified the patient on 03/29/19 at 6:39 AM Georgi Pepe MD  Phone Number: [phone]

## 2019-03-29 NOTE — PROGRESS NOTES
Cardiothoracic Intensivist Progress Note       CARDIOTHORACIC   Post-Operative Day # 0     Subjective     History of Present Illness: Wesley Fields is a 58 y.o. cisgender male with history of decreased exercise capacity prior to OR s/p MVR, chronic HTN, and hyperlipidemia that are being treated.     Review of Systems: Unable to fully assess secondary to intubation and sedation.    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    Social History:   Social History     Social History   • Marital status:      Spouse name: N/A   • Number of children: 4   • Years of education: N/A     Social History Main Topics   • Smoking status: Never Smoker   • Smokeless tobacco: Never Used   • Alcohol use Yes      Comment: 2-3 beers/day   • Drug use: No   • Sexual activity: Not Asked     Other Topics Concern   • None     Social History Narrative    Patient lives with his wife in a 2 story home-bathroom on 2nd floor       Family History:   Family History   Problem Relation Age of Onset   • COPD Mother    • Multiple myeloma Mother    • Lung cancer Father    • Cancer Sister         thyroid       Objective     Vital signs in last 24 hours:  Temp:  [36.2 °C (97.1 °F)] 36.2 °C (97.1 °F)  Heart Rate:  [72] 72  Resp:  [18] 18  BP: (140)/(85) 140/85      Intake/Output Summary (Last 24 hours) at 03/29/19 1618  Last  data filed at 03/29/19 1537   Gross per 24 hour   Intake             4521 ml   Output             2450 ml   Net             2071 ml     Intake/Output this shift:  I/O this shift:  In: 4521 [I.V.:1750; Blood:2571; IV Piggyback:200]  Out: 2450 [Urine:1150; Emesis/NG output:1300]    Labs  Lab Results   Component Value Date    WBC 12.68 (H) 03/29/2019    HGB 8.8 (L) 03/29/2019    HCT 26.7 (L) 03/29/2019     (L) 03/29/2019    ALT 23 03/20/2019    AST 19 03/20/2019     03/20/2019    K 3.8 03/20/2019     03/20/2019    CREATININE 1.1 03/20/2019    BUN 9 03/20/2019    CO2 24 03/20/2019    INR 1.3 03/29/2019    PT 15.6 (H) 03/29/2019    PTT 31 03/29/2019       Full Code    Head/Ear/Nose/Throat:     ETT.                               Eyes:     PERRL.                     Respiratory:     diminished at the bases b/l.               Cardiovascular:     SR, mechanical heart sounds.              Gastrointestinal:     hypoactive bowel sounds.                 Genitourinary:     gambino in place.                    Extremities:     trace edema b/l lower extremities.            Musculoskeletal:      No injury or deformity.                   Neurological:     sedated.       Behavior/Emotional:     sedated.                           Lymph:      No adenopathy noted.                               Skin:      Clean, dry and intact.     Examination of the patient performed at the bedside. Rounded with ICU team and discussed management/plan with surgical staff. Medications, radiological studies and labs reviewed and discussed with attending of record, Dr. Georgi Pepe.    Cardiothoracic Assessment and Plan:    Neurologic: Sedated with propofol for ETT comfort. Will have to change to fentanyl/versed due to oxygenator on RVAD. Will continue daily sedation vacations. Goal RASS 0 to -1. History of metastatic lung cancer to brain which has since been treated.     Cardiovascular: Severe MR s/p MV repair now in cardiogenic shock with  right heart failure, also with chronic diastolic CHF with a preserved EF and hypertension. On vasoactive medication for cardiac and vascular support. RVAD placed intra-op due to worsening RV function. Unclear reason for acute decompensation as no CAD apparent and coronaries appeared to be de-aired thoroughly coming off bypass. Could also be from pinching of coronary arteries at some point during manipulation of valve. Hypotension also due to myocardial stunning. Had some ST elevations but have since resolved. Will resuscitate, monitor MVO2/CO, wean vasoactive medication and continue to optimize cardiac medications. Appreciate Cardiology input. Continue statin for dyslipidemia.     Respiratory: Acute pulmonary insufficiency following thoracic surgery in the scooter-operative period due to prolonged cardiopulmonary bypass run, residual paralytics and residual sedation. I personally reviewed the CXR which portrayed b/l pulmonary congestion and small lung volumes. Will focus on lung protective strategies and minimize FIO2. Will attempt extubation once RV recovers.      Genitourinary: Continue gambino for strict I/O. Making adequate urine output. Will avoid nephrotoxic medications.     Endocrine: Follow FSBG.     Hematologic: No evidence active bleeding. On a heparin gtt. Acute (expected) blood loss anemia from surgery. Follow plasma free Hbg to monitor for hemolysis.     Infectious Disease: No indication for antibiotics at this time. Metastatic CA appears to be regressing by latest CT scan.     Fluids, Electrolytes: Electrolytes replaced per protocol.     Gastrointestinal: NPO for now. GI ppx.     Skin: Bedrest with RVAD, PT when able.      Tubes, lines and drains: Will remove superfluous invasive monitors PRN.    Disposition: Critically ill.  Vasoactive support and right heart failure s/p major invasive cardiac surgery. Continue ICU care.     I personally spent 100 minutes of critical care time with this patient not  including procedure time.          Antonio Lafleur, DO  3/29/2019

## 2019-03-29 NOTE — NURSING NOTE
Received pt from OR and placed on monitor. Dr Pepe at bs. Pt sedated on Propofol gtt. KEDAR. R IJ cordis noted .Dsg cdi. IV gtts infusing according to orders. BP by R rad art line 100syst. Correlates to L fem art line. PT in NSR w occasional PVC.  RACW protek cath in place 31fr at 43cm. Flow 4lpm via 3490rpm. Pulses palpable. R groin site with silver mepilex in place. R chest sm incision with skin glue intact. A wires attached to external pacer. Back up rate 45bpm. MA at 10. Capture at 5 MA. Site covered with tegaderm. Pt with #8 OETT 23cm at lip. Vent per order. Pox 100% on monitor. OGT at 61cm. Clamped. NO bs. Ceron intact draining clear yellow urine. Qs. Will monitor pt. Pt chg bathed anteriorly.

## 2019-03-29 NOTE — ANESTHESIA PROCEDURE NOTES
Procedure Performed: SAURABH      Start Time:  3/29/2019 9:50 AM       End Time:      Preanesthesia Checklist:  Patient identified, IV assessed, risks and benefits discussed, monitors and equipment assessed, procedure being performed at surgeon's request and anesthesia consent obtained.    General Procedure Information  Physician Requesting Echo: SAMUEL MORENO  Location performed:  OR  Intubated  Bite block placed  Heart visualized  Probe Insertion:  Easy  Probe Type:  Transesophageal  Modalities:  2D, 3D, continuous wave Doppler, color flow mapping and pulse wave Doppler        Anesthesia Information  Performed Personally  Anesthesiologist:  DIEGO JEFFERY      Echocardiogram Comments:       Pre bypass: Normal LV size and low normal function. Estimated EF 50-55%. There is prolapse of the P2 scallop of the mitral leaflet with severe, anteriorly directed mitral regurgitation. The trans mitral gradient is 2mmHg. The aortic valve is trileaflet and mildly thickened. No stenosis. Trace aortic regurgitation. The RV is normal in size and function. TAPSE 1.8cm. Mild tricuspid regurgitation. The pulmonic valve is not well visualized. There is a PFO with left to right flow. No thrombus identified in the left atrial appendage. Grade I atheroma of the ascending aorta, grade II of the descending aorta.  There is a pleural effusion on the left.      Post MV repair: There is severe RV dysfunction. A Protek Duo cannula was placed in the RV and was later repositioned and advanced further into the PA. The PFO is still present and demonstrates right to left flow in the setting of RV dysfunction. The LV function is mildly decreased. Estimated EF is 45-50%. No regional wall motion abnormalities-- hypokinesis is global. There is a ring in the mitral position. Trace residual mitral regurgitation. The transmitral gradient is 2mmHg. There is AGUSTIN when the LV is underfilled but does not appear to be hemodynamically significant. No change in  the aortic valve. No pericardial effusion. No aortic dissection. There remains a small pleural effusion on the left.    All images discussed with the surgeon at the time of the exam.

## 2019-03-29 NOTE — ANESTHESIA PROCEDURE NOTES
Arterial Line:    Start time: 3/29/2019 6:57 AM    An arterial line was placed in the OR for the following indication(s): continuous blood pressure monitoring and blood sampling needed.    A 20 G (size), 1 and 3/4 inch (length), Angiocath (type) catheter was placed,   Seldinger technique used, into the Right radial artery, secured by Tegaderm.      Procedure performed using ultrasound guidance and surface landmarks.  Image captured and stored.      Events:  patient tolerated procedure well with no complications.    The supervising anesthesiologist was   Performed By: anesthesiologist  Attending: DIEGO JEFFERY

## 2019-03-29 NOTE — ANESTHESIA PROCEDURE NOTES
Airway  Urgency: elective    Start Time: 3/29/2019 7:09 AM  Airway not difficult    General Information and Staff    Patient location during procedure: OR  Anesthesiologist: DIEGO JEFFERY  Performed: anesthesiologist     Indications and Patient Condition  Indications for airway management: anesthesia  Sedation level: deep  Preoxygenated: yes  Patient position: sniffing  MILS maintained throughout  Mask difficulty assessment: 2 - vent by mask + OA or adjuvant +/- NMBA    Final Airway Details  Final airway type: endotracheal airway      Successful airway: ETT wEVAC  Cuffed: yes   Successful intubation technique: video laryngoscopy  Facilitating devices/methods: intubating stylet  Endotracheal tube insertion site: oral  Blade size: #4  ETT size: 8.0 mm  Cormack-Lehane Classification: grade I - full view of glottis  Placement verified by: chest auscultation, bronchoscopy and capnometry   Measured from: lips  ETT to lips (cm): 23  Number of attempts at approach: 1  Atraumatic airway insertion

## 2019-03-29 NOTE — H&P (VIEW-ONLY)
History and Physical  Pre-admission testing         HISTORY OF PRESENT ILLNESS      Wesley Fields is an 58 y.o. male with a past medical history ofhistory of lung cancer diagnosed 5 years ago with mets to the brain that was treated with radiation.  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  This was treated with steroids with good results.  Most recent Brain scan showed improvement.  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  In November he was diagnosed with a murmur and an echo was done that showed mitral regurgitation.  At that time he was experiencing increased BYERS with a significant weight gain (on steroids) as well as edema.  He was started on diuretics.  Recently they were increased to BID with improvement of symptoms.  He does have BYERS with stair climbing but is able to walk flat surfaces without much BYERS.   He rates the BYERS as moderate and it is relieved by rest.  Associated symptoms include LE edema.  He denies fatigue, chest pain, dizziness.  He does have a recent CT that shows a fast growing LLL nodule and a small right pleural effusion. His echocardiogram revealed Severe mitral regurgitation with large posterior leaflet flail encompassing the entire P2 portion.  Ruptured chorde present. PFO present. Ef 45%.  Symptoms medically managed with diuretics.  [facility] III.  No CAD.  No chest discomfort and No syncopal or presyncopal episodes. Patient tolerating medical therapy.  He is scheduled for VALVE MITRAL MINIMALLY INVASIVE [[id number]     PAST MEDICAL AND SURGICAL HISTORY      Past Medical History:   Diagnosis Date   • CHF (congestive heart failure) (CMS/HCC) (HCC)    • HTN (hypertension) 3/4/2019   • Hyperlipidemia 3/4/2019   • Metastatic lung cancer (metastasis from lung to other site) (CMS/HCC) (HCC) 3/4/2019    chemo x2, XRT   • Mitral regurgitation 3/4/2019   • Multiple thyroid nodules    • Radiation therapy induced brain necrosis     last steroid 1/2019       Past Surgical History:    Procedure Laterality Date   • CARDIAC CATHETERIZATION     • CLAVICLE SURGERY Right     biopsy   • HERNIA REPAIR         PROBLEM LIST     Patient Active Problem List   Diagnosis   • HTN (hypertension)   • Hyperlipidemia   • 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)   • Mitral regurgitation   • Mitral valve insufficiency       MEDICATIONS        Current Outpatient Prescriptions:   •  chlorhexidine (PERIDEX) 0.12 % solution, Swish and spit 15 mL 2 (two) times a day., Disp: , Rfl:   •  furosemide (LASIX) 40 mg tablet, Take 40 mg by mouth 2 (two) times a day., Disp: , Rfl:   •  lisinopril (PRINIVIL) 20 mg tablet, Take 20 mg by mouth 2 (two) times a day., Disp: , Rfl:   •  lorlatinib (LORBRENA) 25 mg tablet, Take 75 mg by mouth daily., Disp: , Rfl:   •  metoprolol succinate XL (TOPROL-XL) 25 mg 24 hr tablet, Take 25 mg by mouth daily., Disp: , Rfl:   •  potassium chloride (KLOR-CON ORAL), Take 10 mEq by mouth. Every other day  , Disp: , Rfl:   •  rosuvastatin (CRESTOR) 10 mg tablet, Take 10 mg by mouth daily., Disp: , Rfl:     ALLERGIES      No Known Allergies    FAMILY HISTORY      Family History   Problem Relation Age of Onset   • COPD Mother    • Multiple myeloma Mother    • Lung cancer Father    • Cancer Sister         thyroid       SOCIAL HISTORY      Social History     Social History   • Marital status:      Spouse name: N/A   • Number of children: 4   • Years of education: N/A     Occupational History   • Not on file.     Social History Main Topics   • Smoking status: Never Smoker   • Smokeless tobacco: Never Used   • Alcohol use Yes      Comment: 2-3 beers/day   • Drug use: No   • Sexual activity: Not on file     Other Topics Concern   • Not on file     Social History Narrative    Patient lives with his wife in a 2 story home-bathroom on 2nd floor       REVIEW OF SYSTEMS      Review of Systems   Respiratory: Positive for shortness of breath.         On exertion   Cardiovascular: Positive for leg swelling.    Psychiatric/Behavioral: The patient is nervous/anxious.        PHYSICAL EXAMINATION      /79 (BP Location: Right upper arm, Patient Position: Sitting)   Pulse 65   Temp 36.5 °C (97.7 °F)   Resp 16   Ht 1.829 m (6')   Wt 113 kg (248 lb 9.6 oz)   SpO2 93%   BMI 33.72 kg/m²   Body mass index is 33.72 kg/m².    Physical Exam   Constitutional: He is oriented to person, place, and time. He appears well-developed and well-nourished.   HENT:   Head: Normocephalic and atraumatic.   Right Ear: External ear normal.   Left Ear: External ear normal.   Mouth/Throat: Oropharynx is clear and moist.   Eyes: Conjunctivae and EOM are normal. Pupils are equal, round, and reactive to light.   Neck: Normal range of motion. Neck supple.   Cardiovascular: Normal rate and regular rhythm.    Murmur heard.   Diastolic murmur is present with a grade of 3/6   Pulses:       Carotid pulses are on the right side with bruit.       Dorsalis pedis pulses are 2+ on the right side, and 2+ on the left side.        Posterior tibial pulses are 2+ on the right side, and 2+ on the left side.   Murmur heard best left 5th ICS   Pulmonary/Chest: Effort normal and breath sounds normal.   Abdominal: Soft. Bowel sounds are normal.   Large obese abdomen   Genitourinary:   Genitourinary Comments: deferred   Musculoskeletal: Normal range of motion. He exhibits edema.   +2 pedal ankle edema L>R   Neurological: He is alert and oriented to person, place, and time.   Skin: Skin is warm and dry.   Psychiatric: He has a normal mood and affect. His behavior is normal. Judgment and thought content normal.   Nursing note and vitals reviewed.         CARIE Blake  3/20/2019

## 2019-03-30 ENCOUNTER — APPOINTMENT (INPATIENT)
Dept: RADIOLOGY | Facility: HOSPITAL | Age: 59
DRG: 003 | End: 2019-03-30
Attending: PHYSICIAN ASSISTANT
Payer: COMMERCIAL

## 2019-03-30 LAB
ALBUMIN SERPL-MCNC: 3.1 G/DL (ref 3.4–5)
ALBUMIN SERPL-MCNC: 3.2 G/DL (ref 3.4–5)
ALP SERPL-CCNC: 29 IU/L (ref 35–126)
ALP SERPL-CCNC: 31 IU/L (ref 35–126)
ALT SERPL-CCNC: 43 IU/L (ref 16–63)
ALT SERPL-CCNC: 48 IU/L (ref 16–63)
ANION GAP SERPL CALC-SCNC: 10 MEQ/L (ref 3–15)
ANION GAP SERPL CALC-SCNC: 8 MEQ/L (ref 3–15)
APTT PPP: 29 SEC (ref 23–35)
APTT PPP: 30 SEC (ref 23–35)
APTT PPP: 34 SEC (ref 23–35)
APTT PPP: 38 SEC (ref 23–35)
APTT PPP: 38 SEC (ref 23–35)
AST SERPL-CCNC: 165 IU/L (ref 15–41)
AST SERPL-CCNC: 185 IU/L (ref 15–41)
BASE EXCESS BLDA CALC-SCNC: -0.5 MEQ/L
BASE EXCESS BLDA CALC-SCNC: -0.6 MEQ/L
BASE EXCESS BLDA CALC-SCNC: -0.6 MEQ/L
BASE EXCESS BLDA CALC-SCNC: -1 MEQ/L
BASE EXCESS BLDA CALC-SCNC: -1 MEQ/L
BASE EXCESS BLDA CALC-SCNC: -1.1 MEQ/L
BASE EXCESS BLDA CALC-SCNC: -1.2 MEQ/L
BASE EXCESS BLDA CALC-SCNC: -1.2 MEQ/L
BASE EXCESS BLDA CALC-SCNC: -1.3 MEQ/L
BASE EXCESS BLDA CALC-SCNC: -1.8 MEQ/L
BASE EXCESS BLDA CALC-SCNC: 0 MEQ/L
BASE EXCESS BLDA CALC-SCNC: 0.2 MEQ/L
BASE EXCESS BLDA CALC-SCNC: 0.2 MEQ/L
BASOPHILS # BLD: 0.01 K/UL (ref 0.01–0.1)
BASOPHILS # BLD: 0.01 K/UL (ref 0.01–0.1)
BASOPHILS NFR BLD: 0.1 %
BASOPHILS NFR BLD: 0.1 %
BILIRUB SERPL-MCNC: 0.6 MG/DL (ref 0.3–1.2)
BILIRUB SERPL-MCNC: 0.7 MG/DL (ref 0.3–1.2)
BUN SERPL-MCNC: 14 MG/DL (ref 8–20)
BUN SERPL-MCNC: 16 MG/DL (ref 8–20)
CA-I BLD-SCNC: 1.09 MMOL/L (ref 1.15–1.27)
CA-I BLD-SCNC: 1.1 MMOL/L (ref 1.15–1.27)
CA-I BLD-SCNC: 1.1 MMOL/L (ref 1.15–1.27)
CA-I BLD-SCNC: 1.11 MMOL/L (ref 1.15–1.27)
CA-I BLD-SCNC: 1.12 MMOL/L (ref 1.15–1.27)
CA-I BLD-SCNC: 1.13 MMOL/L (ref 1.15–1.27)
CA-I BLD-SCNC: 1.15 MMOL/L (ref 1.15–1.27)
CA-I BLD-SCNC: 1.15 MMOL/L (ref 1.15–1.27)
CA-I BLD-SCNC: 1.16 MMOL/L (ref 1.15–1.27)
CA-I BLD-SCNC: 1.17 MMOL/L (ref 1.15–1.27)
CA-I BLD-SCNC: 1.2 MMOL/L (ref 1.15–1.27)
CALCIUM SERPL-MCNC: 7.8 MG/DL (ref 8.9–10.3)
CALCIUM SERPL-MCNC: 8.2 MG/DL (ref 8.9–10.3)
CHLORIDE SERPL-SCNC: 112 MEQ/L (ref 98–109)
CHLORIDE SERPL-SCNC: 113 MEQ/L (ref 98–109)
CO2 BLDA-SCNC: 24 MEQ/L (ref 22–32)
CO2 BLDA-SCNC: 25 MEQ/L (ref 22–32)
CO2 BLDA-SCNC: 26 MEQ/L (ref 22–32)
CO2 BLDA-SCNC: 27 MEQ/L (ref 22–32)
CO2 SERPL-SCNC: 21 MEQ/L (ref 22–32)
CO2 SERPL-SCNC: 23 MEQ/L (ref 22–32)
CREAT SERPL-MCNC: 1.2 MG/DL
CREAT SERPL-MCNC: 1.3 MG/DL
DIFFERENTIAL METHOD BLD: ABNORMAL
DIFFERENTIAL METHOD BLD: ABNORMAL
EOSINOPHIL # BLD: 0 K/UL (ref 0.04–0.54)
EOSINOPHIL # BLD: 0.01 K/UL (ref 0.04–0.54)
EOSINOPHIL NFR BLD: 0 %
EOSINOPHIL NFR BLD: 0.1 %
ERYTHROCYTE [DISTWIDTH] IN BLOOD BY AUTOMATED COUNT: 14.9 % (ref 11.6–14.4)
ERYTHROCYTE [DISTWIDTH] IN BLOOD BY AUTOMATED COUNT: 15.2 % (ref 11.6–14.4)
ERYTHROCYTE [DISTWIDTH] IN BLOOD BY AUTOMATED COUNT: 15.2 % (ref 11.6–14.4)
ERYTHROCYTE [DISTWIDTH] IN BLOOD BY AUTOMATED COUNT: 15.3 % (ref 11.6–14.4)
ERYTHROCYTE [DISTWIDTH] IN BLOOD BY AUTOMATED COUNT: 15.6 % (ref 11.6–14.4)
FIBRINOGEN PPP-MCNC: 500 MG/DL (ref 220–480)
FIBRINOGEN PPP-MCNC: 529 MG/DL (ref 220–480)
FIBRINOGEN PPP-MCNC: 573 MG/DL (ref 220–480)
FIBRINOGEN PPP-MCNC: 649 MG/DL (ref 220–480)
GFR SERPL CREATININE-BSD FRML MDRD: 57.1 ML/MIN/1.73M*2
GFR SERPL CREATININE-BSD FRML MDRD: >60 ML/MIN/1.73M*2
GLUCOSE BLDA-MCNC: 137 MG/DL (ref 65–95)
GLUCOSE BLDA-MCNC: 140 MG/DL (ref 65–95)
GLUCOSE BLDA-MCNC: 141 MG/DL (ref 65–95)
GLUCOSE BLDA-MCNC: 148 MG/DL (ref 65–95)
GLUCOSE BLDA-MCNC: 149 MG/DL (ref 65–95)
GLUCOSE BLDA-MCNC: 150 MG/DL (ref 65–95)
GLUCOSE BLDA-MCNC: 153 MG/DL (ref 65–95)
GLUCOSE BLDA-MCNC: 159 MG/DL (ref 65–95)
GLUCOSE BLDA-MCNC: 159 MG/DL (ref 65–95)
GLUCOSE BLDA-MCNC: 163 MG/DL (ref 65–95)
GLUCOSE BLDA-MCNC: 168 MG/DL (ref 65–95)
GLUCOSE SERPL-MCNC: 145 MG/DL (ref 70–99)
GLUCOSE SERPL-MCNC: 154 MG/DL (ref 70–99)
HCO3 BLDA-SCNC: 23 MEQ/L (ref 21–28)
HCO3 BLDA-SCNC: 24 MEQ/L (ref 21–28)
HCO3 BLDA-SCNC: 25 MEQ/L (ref 21–28)
HCT VFR BLDA CALC: 25 % (ref 36–48)
HCT VFR BLDA CALC: 26 % (ref 36–48)
HCT VFR BLDA CALC: 27 % (ref 36–48)
HCT VFR BLDA CALC: 29 % (ref 36–48)
HCT VFR BLDA CALC: 30 % (ref 36–48)
HCT VFR BLDA CALC: 31 % (ref 36–48)
HCT VFR BLDCO AUTO: 26.5 %
HCT VFR BLDCO AUTO: 27.3 %
HCT VFR BLDCO AUTO: 27.6 %
HCT VFR BLDCO AUTO: 28.7 %
HCT VFR BLDCO AUTO: 29.4 %
HGB BLD-MCNC: 8.5 G/DL
HGB BLD-MCNC: 8.7 G/DL
HGB BLD-MCNC: 9 G/DL
HGB BLD-MCNC: 9.4 G/DL
HGB BLD-MCNC: 9.4 G/DL
HGB FREE PLAS-MCNC: <30 MG/DL
HGB FREE PLAS-MCNC: <30 MG/DL
IMM GRANULOCYTES # BLD AUTO: 0.04 K/UL (ref 0–0.08)
IMM GRANULOCYTES # BLD AUTO: 0.07 K/UL (ref 0–0.08)
IMM GRANULOCYTES NFR BLD AUTO: 0.5 %
IMM GRANULOCYTES NFR BLD AUTO: 0.7 %
INR PPP: 1.1 INR
LACTATE BLDA-SCNC: 1.1 MMOL/L (ref 0.4–1.6)
LACTATE BLDA-SCNC: 1.1 MMOL/L (ref 0.4–1.6)
LACTATE BLDA-SCNC: 1.2 MMOL/L (ref 0.4–1.6)
LACTATE BLDA-SCNC: 1.3 MMOL/L (ref 0.4–1.6)
LACTATE BLDA-SCNC: 1.4 MMOL/L (ref 0.4–1.6)
LACTATE BLDA-SCNC: 1.5 MMOL/L (ref 0.4–1.6)
LACTATE BLDA-SCNC: 1.7 MMOL/L (ref 0.4–1.6)
LACTATE BLDA-SCNC: 1.7 MMOL/L (ref 0.4–1.6)
LACTATE BLDA-SCNC: 2 MMOL/L (ref 0.4–1.6)
LDH SERPL L TO P-CCNC: 556 IU/L (ref 98–192)
LYMPHOCYTES # BLD: 0.59 K/UL (ref 1.2–3.5)
LYMPHOCYTES # BLD: 0.65 K/UL (ref 1.2–3.5)
LYMPHOCYTES NFR BLD: 6.3 %
LYMPHOCYTES NFR BLD: 7.1 %
MAGNESIUM SERPL-MCNC: 2.3 MG/DL (ref 1.8–2.5)
MAGNESIUM SERPL-MCNC: 2.4 MG/DL (ref 1.8–2.5)
MAGNESIUM SERPL-MCNC: 2.4 MG/DL (ref 1.8–2.5)
MAGNESIUM SERPL-MCNC: 2.6 MG/DL (ref 1.8–2.5)
MCH RBC QN AUTO: 28.3 PG (ref 28–33.2)
MCH RBC QN AUTO: 28.5 PG (ref 28–33.2)
MCH RBC QN AUTO: 29 PG (ref 28–33.2)
MCH RBC QN AUTO: 29.2 PG (ref 28–33.2)
MCH RBC QN AUTO: 29.2 PG (ref 28–33.2)
MCHC RBC AUTO-ENTMCNC: 31.9 G/DL (ref 32.2–36.5)
MCHC RBC AUTO-ENTMCNC: 32 G/DL (ref 32.2–36.5)
MCHC RBC AUTO-ENTMCNC: 32.1 G/DL (ref 32.2–36.5)
MCHC RBC AUTO-ENTMCNC: 32.6 G/DL (ref 32.2–36.5)
MCHC RBC AUTO-ENTMCNC: 32.8 G/DL (ref 32.2–36.5)
MCV RBC AUTO: 88.9 FL (ref 83–98)
MCV RBC AUTO: 88.9 FL (ref 83–98)
MCV RBC AUTO: 89.1 FL (ref 83–98)
MCV RBC AUTO: 89.6 FL (ref 83–98)
MCV RBC AUTO: 90.7 FL (ref 83–98)
MONOCYTES # BLD: 0.92 K/UL (ref 0.3–1)
MONOCYTES # BLD: 1.1 K/UL (ref 0.3–1)
MONOCYTES NFR BLD: 10.7 %
MONOCYTES NFR BLD: 11.1 %
NEUTROPHILS # BLD: 6.72 K/UL (ref 1.7–7)
NEUTROPHILS # BLD: 8.47 K/UL (ref 1.7–7)
NEUTS SEG NFR BLD: 81.2 %
NEUTS SEG NFR BLD: 82.1 %
NRBC BLD-RTO: 0 %
NRBC BLD-RTO: 0.6 %
PCO2 BLDA: 37 MM HG (ref 35–48)
PCO2 BLDA: 38 MM HG (ref 35–48)
PCO2 BLDA: 38 MM HG (ref 35–48)
PCO2 BLDA: 39 MM HG (ref 35–48)
PCO2 BLDA: 40 MM HG (ref 35–48)
PCO2 BLDA: 42 MM HG (ref 35–48)
PCO2 BLDA: 43 MM HG (ref 35–48)
PCO2 BLDA: 43 MM HG (ref 35–48)
PCO2 BLDA: 47 MM HG (ref 35–48)
PCO2 BLDA: 47 MM HG (ref 35–48)
PCO2 BLDA: 50 MM HG (ref 35–48)
PDW BLD AUTO: 10.4 FL (ref 9.4–12.4)
PDW BLD AUTO: 10.5 FL (ref 9.4–12.4)
PDW BLD AUTO: 10.5 FL (ref 9.4–12.4)
PDW BLD AUTO: 11 FL (ref 9.4–12.4)
PDW BLD AUTO: 11.3 FL (ref 9.4–12.4)
PH BLDA: 7.31 PH (ref 7.35–7.45)
PH BLDA: 7.34 PH (ref 7.35–7.45)
PH BLDA: 7.34 PH (ref 7.35–7.45)
PH BLDA: 7.36 PH (ref 7.35–7.45)
PH BLDA: 7.37 PH (ref 7.35–7.45)
PH BLDA: 7.38 PH (ref 7.35–7.45)
PH BLDA: 7.39 PH (ref 7.35–7.45)
PH BLDA: 7.4 PH (ref 7.35–7.45)
PH BLDA: 7.41 PH (ref 7.35–7.45)
PH BLDA: 7.42 PH (ref 7.35–7.45)
PHOSPHATE SERPL-MCNC: 5 MG/DL (ref 2.4–4.7)
PLATELET # BLD AUTO: 109 K/UL
PLATELET # BLD AUTO: 116 K/UL
PLATELET # BLD AUTO: 123 K/UL
PLATELET # BLD AUTO: 140 K/UL
PLATELET # BLD AUTO: 95 K/UL
PO2 BLDA: 100 MM HG (ref 83–100)
PO2 BLDA: 101 MM HG (ref 83–100)
PO2 BLDA: 102 MM HG (ref 83–100)
PO2 BLDA: 109 MM HG (ref 83–100)
PO2 BLDA: 116 MM HG (ref 83–100)
PO2 BLDA: 129 MM HG (ref 83–100)
PO2 BLDA: 68 MM HG (ref 83–100)
PO2 BLDA: 68 MM HG (ref 83–100)
PO2 BLDA: 73 MM HG (ref 83–100)
PO2 BLDA: 75 MM HG (ref 83–100)
PO2 BLDA: 79 MM HG (ref 83–100)
PO2 BLDA: 81 MM HG (ref 83–100)
PO2 BLDA: 82 MM HG (ref 83–100)
PO2 BLDA: 88 MM HG (ref 83–100)
PO2 BLDA: 90 MM HG (ref 83–100)
POCT PATIENT TEMPERATURE: 98.6 °F (ref 97–99)
POCT TEST (BLD GAS): ABNORMAL
POTASSIUM BLDA-SCNC: 3.8 MEQ/L (ref 3.4–4.5)
POTASSIUM BLDA-SCNC: 3.9 MEQ/L (ref 3.4–4.5)
POTASSIUM BLDA-SCNC: 4.1 MEQ/L (ref 3.4–4.5)
POTASSIUM BLDA-SCNC: 4.2 MEQ/L (ref 3.4–4.5)
POTASSIUM BLDA-SCNC: 4.3 MEQ/L (ref 3.4–4.5)
POTASSIUM BLDA-SCNC: 4.5 MEQ/L (ref 3.4–4.5)
POTASSIUM SERPL-SCNC: 3.8 MEQ/L (ref 3.6–5.1)
POTASSIUM SERPL-SCNC: 4.2 MEQ/L (ref 3.6–5.1)
POTASSIUM SERPL-SCNC: 4.2 MEQ/L (ref 3.6–5.1)
POTASSIUM SERPL-SCNC: 4.3 MEQ/L (ref 3.6–5.1)
POTASSIUM SERPL-SCNC: 4.3 MEQ/L (ref 3.6–5.1)
POTASSIUM SERPL-SCNC: 4.7 MEQ/L (ref 3.6–5.1)
POTASSIUM SERPL-SCNC: 4.7 MEQ/L (ref 3.6–5.1)
POTASSIUM SERPL-SCNC: 5 MEQ/L (ref 3.6–5.1)
PROT SERPL-MCNC: 4.4 G/DL (ref 6–8.2)
PROT SERPL-MCNC: 4.6 G/DL (ref 6–8.2)
PROTHROMBIN TIME: 14.1 SEC (ref 12.2–14.5)
RBC # BLD AUTO: 2.98 M/UL (ref 4.5–5.8)
RBC # BLD AUTO: 3.07 M/UL (ref 4.5–5.8)
RBC # BLD AUTO: 3.08 M/UL (ref 4.5–5.8)
RBC # BLD AUTO: 3.22 M/UL (ref 4.5–5.8)
RBC # BLD AUTO: 3.24 M/UL (ref 4.5–5.8)
SAO2 % BLDA: 92 % (ref 93–98)
SAO2 % BLDA: 93 % (ref 93–98)
SAO2 % BLDA: 94 % (ref 93–98)
SAO2 % BLDA: 95 % (ref 93–98)
SAO2 % BLDA: 96 % (ref 93–98)
SAO2 % BLDA: 97 % (ref 93–98)
SAO2 % BLDA: 97 % (ref 93–98)
SAO2 % BLDA: 98 % (ref 93–98)
SAO2 % BLDA: 99 % (ref 93–98)
SAO2 % BLDV: 56.4 % (ref 30–60)
SAO2 % BLDV: 56.9 % (ref 30–60)
SAO2 % BLDV: 64.7 % (ref 30–60)
SAO2 % BLDV: 69.7 % (ref 30–60)
SAO2 % BLDV: 73.9 % (ref 30–60)
SODIUM BLDA-SCNC: 137 MEQ/L (ref 136–145)
SODIUM BLDA-SCNC: 137 MEQ/L (ref 136–145)
SODIUM BLDA-SCNC: 138 MEQ/L (ref 136–145)
SODIUM BLDA-SCNC: 139 MEQ/L (ref 136–145)
SODIUM BLDA-SCNC: 141 MEQ/L (ref 136–145)
SODIUM BLDA-SCNC: 142 MEQ/L (ref 136–145)
SODIUM BLDA-SCNC: 143 MEQ/L (ref 136–145)
SODIUM BLDA-SCNC: 144 MEQ/L (ref 136–145)
SODIUM SERPL-SCNC: 143 MEQ/L (ref 136–144)
SODIUM SERPL-SCNC: 144 MEQ/L (ref 136–144)
WBC # BLD AUTO: 10.31 K/UL
WBC # BLD AUTO: 12.6 K/UL
WBC # BLD AUTO: 12.97 K/UL
WBC # BLD AUTO: 14.21 K/UL
WBC # BLD AUTO: 8.28 K/UL

## 2019-03-30 PROCEDURE — 02HA3RZ INSERTION OF SHORT-TERM EXTERNAL HEART ASSIST SYSTEM INTO HEART, PERCUTANEOUS APPROACH: ICD-10-PCS | Performed by: INTERNAL MEDICINE

## 2019-03-30 PROCEDURE — 84132 ASSAY OF SERUM POTASSIUM: CPT | Performed by: PHYSICIAN ASSISTANT

## 2019-03-30 PROCEDURE — 83735 ASSAY OF MAGNESIUM: CPT | Performed by: NURSE PRACTITIONER

## 2019-03-30 PROCEDURE — 63600000 HC DRUGS/DETAIL CODE: Performed by: NURSE PRACTITIONER

## 2019-03-30 PROCEDURE — 25000000 HC PHARMACY GENERAL: Performed by: PHYSICIAN ASSISTANT

## 2019-03-30 PROCEDURE — 25800000 HC PHARMACY IV SOLUTIONS: Performed by: PHYSICIAN ASSISTANT

## 2019-03-30 PROCEDURE — 84100 ASSAY OF PHOSPHORUS: CPT | Performed by: PHYSICIAN ASSISTANT

## 2019-03-30 PROCEDURE — 85610 PROTHROMBIN TIME: CPT | Performed by: THORACIC SURGERY (CARDIOTHORACIC VASCULAR SURGERY)

## 2019-03-30 PROCEDURE — 85730 THROMBOPLASTIN TIME PARTIAL: CPT | Performed by: CLINICAL NURSE SPECIALIST

## 2019-03-30 PROCEDURE — 63600000 HC DRUGS/DETAIL CODE: Performed by: PHYSICIAN ASSISTANT

## 2019-03-30 PROCEDURE — 80053 COMPREHEN METABOLIC PANEL: CPT | Performed by: CLINICAL NURSE SPECIALIST

## 2019-03-30 PROCEDURE — 85027 COMPLETE CBC AUTOMATED: CPT | Performed by: CLINICAL NURSE SPECIALIST

## 2019-03-30 PROCEDURE — 33948 ECMO/ECLS DAILY MGMT-VENOUS: CPT | Performed by: INTERNAL MEDICINE

## 2019-03-30 PROCEDURE — 48000013 HC VENTRICULAR ASSIST, ECMO HOURLY

## 2019-03-30 PROCEDURE — 20000000 HC ROOM AND CARE ICU

## 2019-03-30 PROCEDURE — 83735 ASSAY OF MAGNESIUM: CPT | Performed by: THORACIC SURGERY (CARDIOTHORACIC VASCULAR SURGERY)

## 2019-03-30 PROCEDURE — P9016 RBC LEUKOCYTES REDUCED: HCPCS

## 2019-03-30 PROCEDURE — 80069 RENAL FUNCTION PANEL: CPT | Performed by: THORACIC SURGERY (CARDIOTHORACIC VASCULAR SURGERY)

## 2019-03-30 PROCEDURE — 71045 X-RAY EXAM CHEST 1 VIEW: CPT

## 2019-03-30 PROCEDURE — 99291 CRITICAL CARE FIRST HOUR: CPT | Performed by: ANESTHESIOLOGY

## 2019-03-30 PROCEDURE — 82810 BLOOD GASES O2 SAT ONLY: CPT | Performed by: CLINICAL NURSE SPECIALIST

## 2019-03-30 PROCEDURE — 83615 LACTATE (LD) (LDH) ENZYME: CPT | Performed by: CLINICAL NURSE SPECIALIST

## 2019-03-30 PROCEDURE — 36415 COLL VENOUS BLD VENIPUNCTURE: CPT | Performed by: THORACIC SURGERY (CARDIOTHORACIC VASCULAR SURGERY)

## 2019-03-30 PROCEDURE — 85730 THROMBOPLASTIN TIME PARTIAL: CPT | Performed by: NURSE PRACTITIONER

## 2019-03-30 PROCEDURE — 93005 ELECTROCARDIOGRAM TRACING: CPT | Performed by: THORACIC SURGERY (CARDIOTHORACIC VASCULAR SURGERY)

## 2019-03-30 PROCEDURE — 63700000 HC SELF-ADMINISTRABLE DRUG: Performed by: NURSE PRACTITIONER

## 2019-03-30 PROCEDURE — 83051 HEMOGLOBIN PLASMA: CPT | Performed by: CLINICAL NURSE SPECIALIST

## 2019-03-30 PROCEDURE — 94003 VENT MGMT INPAT SUBQ DAY: CPT

## 2019-03-30 PROCEDURE — 63700000 HC SELF-ADMINISTRABLE DRUG: Performed by: PHYSICIAN ASSISTANT

## 2019-03-30 PROCEDURE — 85384 FIBRINOGEN ACTIVITY: CPT | Performed by: CLINICAL NURSE SPECIALIST

## 2019-03-30 PROCEDURE — 63600000 HC DRUGS/DETAIL CODE

## 2019-03-30 PROCEDURE — 99232 SBSQ HOSP IP/OBS MODERATE 35: CPT | Mod: 25 | Performed by: INTERNAL MEDICINE

## 2019-03-30 PROCEDURE — 5A02216 ASSISTANCE WITH CARDIAC OUTPUT USING OTHER PUMP, CONTINUOUS: ICD-10-PCS | Performed by: INTERNAL MEDICINE

## 2019-03-30 RX ORDER — FOLIC ACID 1 MG/1
1 TABLET ORAL DAILY
Status: DISCONTINUED | OUTPATIENT
Start: 2019-03-30 | End: 2019-04-11 | Stop reason: HOSPADM

## 2019-03-30 RX ORDER — THIAMINE HCL 100 MG
100 TABLET ORAL DAILY
Status: DISCONTINUED | OUTPATIENT
Start: 2019-03-30 | End: 2019-04-11 | Stop reason: HOSPADM

## 2019-03-30 RX ORDER — FUROSEMIDE 10 MG/ML
40 INJECTION INTRAMUSCULAR; INTRAVENOUS EVERY 12 HOURS
Status: DISCONTINUED | OUTPATIENT
Start: 2019-03-30 | End: 2019-03-30

## 2019-03-30 RX ORDER — OXYCODONE HYDROCHLORIDE 5 MG/1
5 TABLET ORAL EVERY 4 HOURS PRN
Status: DISCONTINUED | OUTPATIENT
Start: 2019-03-30 | End: 2019-04-11 | Stop reason: HOSPADM

## 2019-03-30 RX ORDER — SODIUM CHLORIDE 9 MG/ML
5 INJECTION, SOLUTION INTRAVENOUS AS NEEDED
Status: DISCONTINUED | OUTPATIENT
Start: 2019-03-30 | End: 2019-03-30

## 2019-03-30 RX ORDER — ENOXAPARIN SODIUM 100 MG/ML
40 INJECTION SUBCUTANEOUS
Status: DISCONTINUED | OUTPATIENT
Start: 2019-03-30 | End: 2019-03-30

## 2019-03-30 RX ORDER — HEPARIN SODIUM 10000 [USP'U]/100ML
1400 INJECTION, SOLUTION INTRAVENOUS
Status: DISCONTINUED | OUTPATIENT
Start: 2019-03-30 | End: 2019-04-04

## 2019-03-30 RX ORDER — HYDROMORPHONE HYDROCHLORIDE 1 MG/ML
0.5 INJECTION, SOLUTION INTRAMUSCULAR; INTRAVENOUS; SUBCUTANEOUS
Status: DISCONTINUED | OUTPATIENT
Start: 2019-03-30 | End: 2019-04-09

## 2019-03-30 RX ORDER — ACETAMINOPHEN 325 MG/1
975 TABLET ORAL EVERY 6 HOURS
Status: DISCONTINUED | OUTPATIENT
Start: 2019-03-30 | End: 2019-04-11 | Stop reason: HOSPADM

## 2019-03-30 RX ORDER — INSULIN ASPART 100 [IU]/ML
3 INJECTION, SOLUTION INTRAVENOUS; SUBCUTANEOUS
Status: DISCONTINUED | OUTPATIENT
Start: 2019-03-31 | End: 2019-04-04

## 2019-03-30 RX ORDER — MIDAZOLAM HYDROCHLORIDE 2 MG/2ML
2 INJECTION, SOLUTION INTRAMUSCULAR; INTRAVENOUS EVERY 4 HOURS PRN
Status: DISCONTINUED | OUTPATIENT
Start: 2019-03-30 | End: 2019-04-10

## 2019-03-30 RX ORDER — MEPERIDINE HYDROCHLORIDE 25 MG/ML
12.5 INJECTION INTRAMUSCULAR; INTRAVENOUS; SUBCUTANEOUS ONCE
Status: COMPLETED | OUTPATIENT
Start: 2019-03-30 | End: 2019-03-30

## 2019-03-30 RX ORDER — MEPERIDINE HYDROCHLORIDE 25 MG/ML
INJECTION INTRAMUSCULAR; INTRAVENOUS; SUBCUTANEOUS
Status: COMPLETED
Start: 2019-03-30 | End: 2019-03-30

## 2019-03-30 RX ORDER — FUROSEMIDE 10 MG/ML
40 INJECTION INTRAMUSCULAR; INTRAVENOUS
Status: DISCONTINUED | OUTPATIENT
Start: 2019-03-30 | End: 2019-03-31

## 2019-03-30 RX ORDER — HYDROMORPHONE HYDROCHLORIDE 1 MG/ML
INJECTION, SOLUTION INTRAMUSCULAR; INTRAVENOUS; SUBCUTANEOUS
Status: COMPLETED
Start: 2019-03-30 | End: 2019-03-30

## 2019-03-30 RX ADMIN — DOCUSATE SODIUM 100 MG: 100 CAPSULE, LIQUID FILLED ORAL at 20:25

## 2019-03-30 RX ADMIN — SODIUM CHLORIDE 5.94 UNITS/HR: 9 INJECTION, SOLUTION INTRAVENOUS at 22:59

## 2019-03-30 RX ADMIN — ASPIRIN 81 MG: 81 TABLET, COATED ORAL at 09:43

## 2019-03-30 RX ADMIN — PROPOFOL 30 MCG/KG/MIN: 10 INJECTION, EMULSION INTRAVENOUS at 02:26

## 2019-03-30 RX ADMIN — POTASSIUM CHLORIDE 20 MEQ: 200 INJECTION, SOLUTION INTRAVENOUS at 11:32

## 2019-03-30 RX ADMIN — STANDARDIZED SENNA CONCENTRATE 1 TABLET: 8.6 TABLET ORAL at 20:25

## 2019-03-30 RX ADMIN — MEPERIDINE HYDROCHLORIDE 12.5 MG: 25 INJECTION INTRAMUSCULAR; INTRAVENOUS; SUBCUTANEOUS at 09:14

## 2019-03-30 RX ADMIN — MIDAZOLAM HYDROCHLORIDE 2 MG: 1 INJECTION, SOLUTION INTRAMUSCULAR; INTRAVENOUS at 21:53

## 2019-03-30 RX ADMIN — FUROSEMIDE 40 MG: 10 INJECTION, SOLUTION INTRAMUSCULAR; INTRAVENOUS at 08:58

## 2019-03-30 RX ADMIN — ACETAMINOPHEN 975 MG: 325 TABLET ORAL at 12:39

## 2019-03-30 RX ADMIN — VASOPRESSIN 0.04 UNITS/MIN: 20 INJECTION INTRAVENOUS at 02:01

## 2019-03-30 RX ADMIN — Medication 2 G: at 06:46

## 2019-03-30 RX ADMIN — KETOROLAC TROMETHAMINE 30 MG: 30 INJECTION, SOLUTION INTRAMUSCULAR; INTRAVENOUS at 21:35

## 2019-03-30 RX ADMIN — Medication 100 MG: at 13:45

## 2019-03-30 RX ADMIN — DOBUTAMINE IN DEXTROSE 4 MCG/KG/MIN: 200 INJECTION, SOLUTION INTRAVENOUS at 16:21

## 2019-03-30 RX ADMIN — COLCHICINE 0.6 MG: 0.6 TABLET, FILM COATED ORAL at 09:43

## 2019-03-30 RX ADMIN — HYDROMORPHONE HYDROCHLORIDE 0.5 MG: 1 INJECTION, SOLUTION INTRAMUSCULAR; INTRAVENOUS; SUBCUTANEOUS at 17:19

## 2019-03-30 RX ADMIN — FUROSEMIDE 40 MG: 10 INJECTION, SOLUTION INTRAMUSCULAR; INTRAVENOUS at 18:15

## 2019-03-30 RX ADMIN — POTASSIUM CHLORIDE 20 MEQ: 200 INJECTION, SOLUTION INTRAVENOUS at 02:26

## 2019-03-30 RX ADMIN — VASOPRESSIN 0.05 UNITS/MIN: 20 INJECTION INTRAVENOUS at 08:59

## 2019-03-30 RX ADMIN — MIDAZOLAM HYDROCHLORIDE 6 MG/HR: 1 INJECTION, SOLUTION INTRAMUSCULAR; INTRAVENOUS at 04:11

## 2019-03-30 RX ADMIN — POTASSIUM CHLORIDE 20 MEQ: 200 INJECTION, SOLUTION INTRAVENOUS at 06:08

## 2019-03-30 RX ADMIN — OXYCODONE HYDROCHLORIDE 5 MG: 5 TABLET ORAL at 21:52

## 2019-03-30 RX ADMIN — CHLORHEXIDINE GLUCONATE 15 ML: 1.2 RINSE ORAL at 21:48

## 2019-03-30 RX ADMIN — FAMOTIDINE 20 MG: 10 INJECTION INTRAVENOUS at 08:58

## 2019-03-30 RX ADMIN — KETOROLAC TROMETHAMINE 30 MG: 30 INJECTION, SOLUTION INTRAMUSCULAR; INTRAVENOUS at 12:39

## 2019-03-30 RX ADMIN — DOBUTAMINE IN DEXTROSE 5 MCG/KG/MIN: 200 INJECTION, SOLUTION INTRAVENOUS at 02:43

## 2019-03-30 RX ADMIN — HEPARIN SODIUM AND DEXTROSE 600 UNITS/HR: 10000; 5 INJECTION INTRAVENOUS at 10:00

## 2019-03-30 RX ADMIN — FOLIC ACID 1 MG: 1 TABLET ORAL at 13:45

## 2019-03-30 RX ADMIN — FAMOTIDINE 20 MG: 10 INJECTION INTRAVENOUS at 20:25

## 2019-03-30 RX ADMIN — CALCIUM CHLORIDE 1 G: 100 INJECTION, SOLUTION INTRAVENOUS at 13:27

## 2019-03-30 NOTE — PROGRESS NOTES
Cardiothoracic Intensivist Progress Note       CARDIOTHORACIC   Post-Operative Day # 1     Subjective     History of Present Illness: Wesley Fields is a 58 y.o. cisgender male with history of decreased exercise capacity prior to OR s/p MVR, chronic HTN, and hyperlipidemia that are being treated.     Review of Systems: Unable to fully assess secondary to intubation and sedation.    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    Social History:   Social History     Social History   • Marital status:      Spouse name: N/A   • Number of children: 4   • Years of education: N/A     Social History Main Topics   • Smoking status: Never Smoker   • Smokeless tobacco: Never Used   • Alcohol use Yes      Comment: 2-3 beers/day   • Drug use: No   • Sexual activity: Not Asked     Other Topics Concern   • None     Social History Narrative    Patient lives with his wife in a 2 story home-bathroom on 2nd floor       Family History:   Family History   Problem Relation Age of Onset   • COPD Mother    • Multiple myeloma Mother    • Lung cancer Father    • Cancer Sister         thyroid       Objective     Vital signs in last 24 hours:  Heart Rate:  [52-80] 68  Resp:  [0-23] 18  FiO2 (%) (Set):  [40 %-80 %] 40 %      Intake/Output Summary (Last 24 hours) at 03/30/19 0618  Last data filed at 03/30/19 0600    Gross per 24 hour   Intake          6044.73 ml   Output             3940 ml   Net          2104.73 ml     Intake/Output this shift:  I/O this shift:  In: 1147.3 [I.V.:997.3; IV Piggyback:150]  Out: 1040 [Urine:890; Chest Tube:150]    Labs  Lab Results   Component Value Date    WBC 8.28 03/30/2019    HGB 8.5 (L) 03/30/2019    HCT 26.5 (L) 03/30/2019     (L) 03/30/2019    ALT 43 03/30/2019     (H) 03/30/2019     03/30/2019    K 4.3 03/30/2019     (H) 03/30/2019    CREATININE 1.2 03/30/2019    BUN 14 03/30/2019    CO2 23 03/30/2019    MG 2.4 03/30/2019    PHOS 5.0 (H) 03/30/2019    INR 1.1 03/30/2019    HGBA1C 5.6 03/29/2019    PT 14.1 03/30/2019    PTT 30 03/30/2019       Full Code    Head/Ear/Nose/Throat:     ETT.                               Eyes:     PERRL.                     Respiratory:     diminished at the bases b/l.               Cardiovascular:     Paced, mechanical heart sounds.              Gastrointestinal:     hypoactive bowel sounds.                 Genitourinary:     gambino in place.                    Extremities:     trace edema b/l lower extremities.            Musculoskeletal:      No injury or deformity.                   Neurological:     sedated.       Behavior/Emotional:     sedated.                           Lymph:      No adenopathy noted.                               Skin:      Clean, dry and intact.     Examination of the patient performed at the bedside. Rounded with ICU team and discussed management/plan with surgical staff. Medications, radiological studies and labs reviewed and discussed with attending of record, Dr. Georgi Pepe.    Cardiothoracic Assessment and Plan:    Neurologic: Sedated with propofol, fentanyl and versed for ETT comfort. Will continue daily sedation vacations. Goal RASS 0 to -1. History of metastatic lung cancer to brain which has since been treated.     Cardiovascular: Severe MR s/p MV repair now in cardiogenic shock with right heart  failure, also with chronic diastolic CHF with a preserved EF and hypertension. Still vasoactive medication for cardiac and vascular support. RVAD placed intra-op due to worsening RV function. Unclear reason for acute decompensation as no CAD apparent and coronaries appeared to be de-aired thoroughly coming off bypass. Could also be from pinching of coronary arteries at some point during manipulation of valve. Hypotension also due to myocardial stunning. Had some ST elevations but have since resolved. Will resuscitate, monitor MVO2/CO, wean vasoactive medication and continue to optimize cardiac medications. Appreciate Cardiology input. Continue statin for dyslipidemia.     Respiratory: Acute pulmonary insufficiency following thoracic surgery in the scooter-operative period due to prolonged cardiopulmonary bypass run and residual sedation. I personally reviewed the most recent CXR which portrayed b/l pulmonary congestion and small lung volumes. Will wean vent settings and evaluate for possible extubation.      Genitourinary: Continue gambino for strict I/O. Making adequate urine output. Will avoid nephrotoxic medications.     Endocrine: Follow [facility]SBG.     Hematologic: No evidence active bleeding. Lovenox for DVT ppx. Acute (expected) blood loss anemia from surgery. Follow plasma free Hbg to monitor for hemolysis.     Infectious Disease: No indication for antibiotics at this time. Metastatic CA appears to be regressing by latest CT scan.     Fluids, Electrolytes: Electrolytes replaced per protocol.     Gastrointestinal: NPO for now. GI ppx.     Skin: Bedrest with RVAD, PT when able.      Tubes, lines and drains: Will remove superfluous invasive monitors PRN.    Disposition: Critically ill.  Vasoactive support and right heart failure s/p major invasive cardiac surgery. Continue ICU care.     I personally spent 35 minutes of critical care time with this patient not including procedure time.          Antonio Lafleur,  DO  3/30/2019

## 2019-03-30 NOTE — PLAN OF CARE
Problem: Patient Care Overview  Goal: Plan of Care Review  Outcome: Ongoing (interventions implemented as appropriate)   03/30/19 2125   Coping/Psychosocial   Plan Of Care Reviewed With other (see comments)  (NP)   Plan of Care Review   Progress no change   Recommendations  1. When extubated ADAT regular, TIARA  2. If unable to extubate today, place dobhoff tube and begin Peptamen AF at 20ml/hr, goal 40 ml/hr. Provide 5 packs prosource. If euvolemic provide 45ml/hr water flush.  3. Goal -150mg/dl

## 2019-03-30 NOTE — CONSULTS
Nutrition Assessment    Recommendations  1. When extubated ADAT regular, TIARA  2. If unable to extubate today, place dobhoff tube and begin Peptamen AF at 20ml/hr, goal 40 ml/hr. Provide 5 packs prosource. If euvolemic provide 45ml/hr water flush.  3. Goal -150mg/dl    Nutrition Charting Type: Nutrition Assessment    Clinical Course: Patient is a 58 y.o. male who was admitted on 3/29/2019 with a diagnosis of Mitral valve insufficiency, unspecified etiology [I34.0]  Mitral regurgitation [I34.0].     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)    • Radiation therapy induced brain necrosis     last steroid 1/2019     Past Surgical History:   Procedure Laterality Date   • CARDIAC CATHETERIZATION     • CLAVICLE SURGERY Right     biopsy   • HERNIA REPAIR         General Information  Reason for Consult: Provider order       Physical Findings  Additional Documentation: Physical Appearance (Group)     Physical Appearance  Overall Physical Appearance: on ventilator support  Last Bowel Movement: 03/28/19  Skin: surgical incision     Nutrition Order Review  Nutrition Order Review: does not meet nutritional requirements     Anthropometrics  Height: 182.9 cm (6')         Current Weight  Weight Method: Bed scale  Weight: 116 kg (256 lb 13.4 oz)     Ideal Body Weight (IBW)  Ideal Body Weight (IBW) (kg): 82.07  % Ideal Body Weight: 137.62            Body Mass Index (BMI)  BMI (Calculated): 34.8       Labs/Procedures/Meds  Additional Documentation: Lab Results (Group), Pertinent Medications (Group)     Lab Results  Lab Results Reviewed: reviewed, pertinent   BMP Results       03/30/19 03/30/19 03/30/19                     0806 0344 0214          144 -         K 4.7 4.3 3.8          4.7           Cl 112 (H) 113 (H) -         CO2 21 (L) 23 -         Glucose 145 (H) 154 (H) -         BUN 16 14 -         Creatinine 1.3 1.2 -         Calcium 7.8 (L) 8.2 (L) -         Anion Gap 10 8 -         EGFR 57.1 (L) &gt;60.0 -                         Pertinent Medications  Pertinent Medications Reviewed: reviewed, pertinent   Scheduled Meds:  acetaminophen 975 mg oral q6h RIGOBERTO   aspirin 81 mg oral Daily   chlorhexidine 15 mL Mouth/Throat 2 times per day   colchicine 0.6 mg oral Daily   docusate sodium 100 mg oral BID   famotidine 20 mg intravenous BID   Or      famotidine 20 mg oral BID   furosemide 40 mg intravenous q12h [facility]   [START ON 4/2/2019] magnesium oxide 400 mg oral BID   multivitamin 1 tablet oral Daily   potassium chloride 20 mEq oral BID   rosuvastatin 10 mg oral Daily (6p)   senna 1 tablet oral BID     Continuous Infusions:  dexmedetomidine in 0.9 % NaCl 0.2-1.5 mcg/kg/hr    DOBUTamine 5 mcg/kg/min Last Rate: 5 mcg/kg/min (03/30/19 0900)   fentaNYL 0-200 mcg/hr Last Rate: Stopped (03/30/19 0914)   heparin 600 Units/hr    insulin 0-15 Units/hr Last Rate: 2.84 Units/hr (03/30/19 0900)   midazolam 0-15 mg/hr Last Rate: Stopped (03/30/19 0915)   propofol 10-80 mcg/kg/min Last Rate: Stopped (03/30/19 0610)   sodium chloride 0.9 %  Last Rate: 15 mL/hr at 03/30/19 0900   vasopressin (PITRESSIN) continuous infusion 0.01-0.1 Units/min Last Rate: 0.05 Units/min (03/30/19 0900)     Estimated/Assessed Needs  Additional Documentation: Calorie Requirements (Group), Protein Requirements (Group)     Calorie Requirements  Energy Calorie Reqirements (kcal):  (7085-5480)  Estimated kCal Needs: Actual Body Weight  Energy Need Method: kcal/kg  Calorie/kg Recommended: 11-14       Protein Requirements  Protein Recommended: >2.0  Recommended Dosing Weight(Estimated Protein Needs): Ideal Body Weight (160)     Fluid requirements = 20-25ml/kg  adjBW = 5569-4739    Dosing weight = IBW 81kg, ABW 116kg, adjBW 90kg    Skin: surgical    NFPE: obese    Clinical comments: s/o MVR, remains intubate Fio2 40, PEEP 5. Plans for extubation this am however asked by PA for TF recs in case unable to extubate.    Peptamen AF at 40ml/hr with 5 packs prosource to provide 1452kcal (13kcal/kg ABW), 148g protein (1.8g/kg IBW) and 775ml h20 inherent in TF.     When extubated, regular, TIARA diet.     Goals: meet >75% needs via TF vs diet  Monitor: extubation, TF, diet, labs, intake    Recommendations: See above     PES  Statement: PES Statement  Nutrition Diagnosis: Swallowing Difficulty  Related To:: intubation  As Evidenced By:: npo  Nutritional Needs Met?: No  Nutrition Status Classification: Severe nutritional compromise                                   Date: 03/30/19  Signature: DENI Owens

## 2019-03-30 NOTE — PROCEDURES
Setting OR  Indications: RV failure and Hypoxemia    Access was Ultrasound Guided Subcalvian vein on the right side  Next an 0.018 wire was passed and confirmed on fluro and SAURABH  Next a sheath was placed and a bubble study was done to secondary confirm position.  A swan mike catheter was directed to the right PA and exchanged with a j wire for a 6 Fr pig tail catheter  Next a superstiff 260 wire was placed into the right PA via the pig  Next serial dilations occurred and a 31 Fr Proteck Duo cannula was advanced to the main pa.  Later repositioned once.    Following this the dilator was removed and wet to wet connections were performed to a cardiohelp Rotaflow Oxygenator system.    Initation of Flow was at 4.5 Liters      Successful Cannulation and initation of VV ecmo/RVAD

## 2019-03-30 NOTE — PROGRESS NOTES
Patient: Wesley Fields  Location: [address] [address]23  MRN: [id number]  Today's date: 3/30/2019    Attempted to see patient for therapy. Unable due to medical hold.       Pt remains intubated. Spoke with medical team, PT to follow post extubation.

## 2019-03-30 NOTE — PROGRESS NOTES
ECMO Daily Assessment and Management Procedure    Type: ProtekDuo Right ventricular assist device    Indication: Cardiogenic Shock-Right ventricle    Drainage Cannula:Right Subclavian Vein 31Fr    Return Cannula:same as drainage(double lumen) cannula 31Fr    :50% 2 l/min    Pre-pressure 227 , , delta pressure 27  Circuit flow 4 L/min at 3527 RPM    Circuit fully inspected from drainage to return cannula. Fibrin visible in oxygenator - post membrane.   Thrombus visible in oxygenator - pre membrane. There are no tubing kinks. The age of the circuit lines is 2 days.      Cardiology Daily Progress Note    Subjective   Wesley Fields is a 58 y.o. male who was admitted for Mitral valve insufficiency, unspecified etiology [I34.0]  Mitral regurgitation [I34.0].    Interval History: none.     Objective     Vital signs in last 24 hours:  Heart Rate:  [52-84] 69  Resp:  [0-23] 19  FiO2 (%) (Set):  [40 %-100 %] 100 %      Intake/Output Summary (Last 24 hours) at 03/30/19 1543  Last data filed at 03/30/19 1500   Gross per 24 hour   Intake          2575.98 ml   Output             3030 ml   Net          -454.02 ml     Intake/Output this shift:  I/O this shift:  In: 727.3 [P.O.:150; I.V.:517.3; NG/GT:60]  Out: 1540 [Urine:1290; Emesis/NG output:200; Chest Tube:50]    Labs  Lab Results   Component Value Date    WBC 12.60 (H) 03/30/2019    HGB 9.4 (L) 03/30/2019    HCT 28.7 (L) 03/30/2019     (L) 03/30/2019    ALT 48 03/30/2019     (H) 03/30/2019     03/30/2019    K 4.3 03/30/2019     (H) 03/30/2019    CREATININE 1.3 03/30/2019    BUN 16 03/30/2019    CO2 21 (L) 03/30/2019    INR 1.1 03/30/2019    HGBA1C 5.6 03/29/2019     Troponin I Results     No lab values to display.          Imaging  I have independently reviewed the pertinent imaging from the last 24 hrs.    ECG   sinus rhythm    Telemetry  sinus rhythm      Physical Exam:  General Appearance:  Alert, no distress   Head:  Normocephalic, without  obvious abnormality, atraumatic   Eyes:  Conjunctiva/corneas clear, EOM's intact   Neck: No thyroid enlargement. No JVD. No bruits    Lungs:   Decreased breath sounds at the bases   Heart:  Regular rhythm,Soft systolic murmur   Abdomen:   Soft, non-tender, no masses, no organomegaly   Vascular: Pulses 2+ and symmetric all extremities, no carotid bruit or jugular vein distention   Musculoskeletal:  Skin: No injury or deformity  Skin color, texture, turgor normal, no rashes or lesions, no cyanosis    Extremities: No edema   Behavior/Emotional: Appropriate, cooperative   Neurologic:                          sedated follow commands      Assessment/Plan   No new Assessment & Plan notes have been filed under this hospital service since the last note was generated.  Service: Cardiac, Interventional    Agree to extubate to decrease PVR for RV function  Keep proteck  Maybe able to wean off Oxygenator portion tomorrow if gases are acceptable to pure RVAD    cct 40 mins           Juan M Victoria,   3/30/2019

## 2019-03-31 ENCOUNTER — APPOINTMENT (INPATIENT)
Dept: RADIOLOGY | Facility: HOSPITAL | Age: 59
DRG: 003 | End: 2019-03-31
Attending: PHYSICIAN ASSISTANT
Payer: COMMERCIAL

## 2019-03-31 LAB
ALBUMIN SERPL-MCNC: 2.8 G/DL (ref 3.4–5)
ALP SERPL-CCNC: 29 IU/L (ref 35–126)
ALT SERPL-CCNC: 56 IU/L (ref 16–63)
ANION GAP SERPL CALC-SCNC: 10 MEQ/L (ref 3–15)
ANION GAP SERPL CALC-SCNC: 8 MEQ/L (ref 3–15)
ANISOCYTOSIS BLD QL SMEAR: ABNORMAL
APTT PPP: 38 SEC (ref 23–35)
APTT PPP: 39 SEC (ref 23–35)
APTT PPP: 40 SEC (ref 23–35)
APTT PPP: 40 SEC (ref 23–35)
APTT PPP: 42 SEC (ref 23–35)
AST SERPL-CCNC: 201 IU/L (ref 15–41)
ATRIAL RATE: 241
ATRIAL RATE: 54
ATRIAL RATE: 70
ATRIAL RATE: 72
ATRIAL RATE: 82
BASE EXCESS BLDA CALC-SCNC: -0.2 MEQ/L
BASE EXCESS BLDA CALC-SCNC: -0.2 MEQ/L
BASE EXCESS BLDA CALC-SCNC: -0.3 MEQ/L
BASE EXCESS BLDA CALC-SCNC: -0.3 MEQ/L
BASE EXCESS BLDA CALC-SCNC: -0.4 MEQ/L
BASE EXCESS BLDA CALC-SCNC: -1 MEQ/L
BASE EXCESS BLDA CALC-SCNC: 0 MEQ/L
BASE EXCESS BLDA CALC-SCNC: 0.1 MEQ/L
BASE EXCESS BLDA CALC-SCNC: 0.2 MEQ/L
BASE EXCESS BLDA CALC-SCNC: 0.7 MEQ/L
BASOPHILS # BLD: 0 K/UL (ref 0.01–0.1)
BASOPHILS NFR BLD: 0 %
BILIRUB SERPL-MCNC: 0.8 MG/DL (ref 0.3–1.2)
BUN SERPL-MCNC: 15 MG/DL (ref 8–20)
BUN SERPL-MCNC: 16 MG/DL (ref 8–20)
CA-I BLD-SCNC: 1.13 MMOL/L (ref 1.15–1.27)
CA-I BLD-SCNC: 1.14 MMOL/L (ref 1.15–1.27)
CA-I BLD-SCNC: 1.17 MMOL/L (ref 1.15–1.27)
CA-I BLD-SCNC: 1.19 MMOL/L (ref 1.15–1.27)
CA-I BLD-SCNC: 1.19 MMOL/L (ref 1.15–1.27)
CA-I BLD-SCNC: 1.2 MMOL/L (ref 1.15–1.27)
CA-I BLD-SCNC: 1.2 MMOL/L (ref 1.15–1.27)
CA-I BLD-SCNC: 1.23 MMOL/L (ref 1.15–1.27)
CA-I BLD-SCNC: 1.23 MMOL/L (ref 1.15–1.27)
CA-I BLD-SCNC: 1.27 MMOL/L (ref 1.15–1.27)
CALCIUM SERPL-MCNC: 8.4 MG/DL (ref 8.9–10.3)
CALCIUM SERPL-MCNC: 8.5 MG/DL (ref 8.9–10.3)
CHLORIDE SERPL-SCNC: 104 MEQ/L (ref 98–109)
CHLORIDE SERPL-SCNC: 108 MEQ/L (ref 98–109)
CO2 BLDA-SCNC: 25 MEQ/L (ref 22–32)
CO2 BLDA-SCNC: 26 MEQ/L (ref 22–32)
CO2 BLDA-SCNC: 27 MEQ/L (ref 22–32)
CO2 SERPL-SCNC: 20 MEQ/L (ref 22–32)
CO2 SERPL-SCNC: 22 MEQ/L (ref 22–32)
CREAT SERPL-MCNC: 1.2 MG/DL
CREAT SERPL-MCNC: 1.2 MG/DL
CROSSMATCH: NORMAL
CROSSMATCH: NORMAL
DIFFERENTIAL METHOD BLD: ABNORMAL
EOSINOPHIL # BLD: 0 K/UL (ref 0.04–0.54)
EOSINOPHIL NFR BLD: 0 %
ERYTHROCYTE [DISTWIDTH] IN BLOOD BY AUTOMATED COUNT: 15 % (ref 11.6–14.4)
ERYTHROCYTE [DISTWIDTH] IN BLOOD BY AUTOMATED COUNT: 15.1 % (ref 11.6–14.4)
ERYTHROCYTE [DISTWIDTH] IN BLOOD BY AUTOMATED COUNT: 15.2 % (ref 11.6–14.4)
ERYTHROCYTE [DISTWIDTH] IN BLOOD BY AUTOMATED COUNT: 15.3 % (ref 11.6–14.4)
ERYTHROCYTE [DISTWIDTH] IN BLOOD BY AUTOMATED COUNT: 15.3 % (ref 11.6–14.4)
FIBRINOGEN PPP-MCNC: 645 MG/DL (ref 220–480)
FIBRINOGEN PPP-MCNC: 654 MG/DL (ref 220–480)
FIBRINOGEN PPP-MCNC: 684 MG/DL (ref 220–480)
FIBRINOGEN PPP-MCNC: 709 MG/DL (ref 220–480)
FIBRINOGEN PPP-MCNC: 729 MG/DL (ref 220–480)
GFR SERPL CREATININE-BSD FRML MDRD: >60 ML/MIN/1.73M*2
GFR SERPL CREATININE-BSD FRML MDRD: >60 ML/MIN/1.73M*2
GLUCOSE BLDA-MCNC: 119 MG/DL (ref 65–95)
GLUCOSE BLDA-MCNC: 125 MG/DL (ref 65–95)
GLUCOSE BLDA-MCNC: 127 MG/DL (ref 65–95)
GLUCOSE BLDA-MCNC: 132 MG/DL (ref 65–95)
GLUCOSE BLDA-MCNC: 136 MG/DL (ref 65–95)
GLUCOSE BLDA-MCNC: 138 MG/DL (ref 65–95)
GLUCOSE BLDA-MCNC: 142 MG/DL (ref 65–95)
GLUCOSE BLDA-MCNC: 144 MG/DL (ref 65–95)
GLUCOSE BLDA-MCNC: 145 MG/DL (ref 65–95)
GLUCOSE BLDA-MCNC: 156 MG/DL (ref 65–95)
GLUCOSE SERPL-MCNC: 124 MG/DL (ref 70–99)
GLUCOSE SERPL-MCNC: 143 MG/DL (ref 70–99)
HCO3 BLDA-SCNC: 24 MEQ/L (ref 21–28)
HCO3 BLDA-SCNC: 25 MEQ/L (ref 21–28)
HCT VFR BLDA CALC: 25 % (ref 36–48)
HCT VFR BLDA CALC: 26 % (ref 36–48)
HCT VFR BLDA CALC: 28 % (ref 36–48)
HCT VFR BLDA CALC: 28 % (ref 36–48)
HCT VFR BLDA CALC: 29 % (ref 36–48)
HCT VFR BLDCO AUTO: 26.6 %
HCT VFR BLDCO AUTO: 27.6 %
HCT VFR BLDCO AUTO: 27.7 %
HCT VFR BLDCO AUTO: 28 %
HCT VFR BLDCO AUTO: 28.8 %
HGB BLD-MCNC: 8.6 G/DL
HGB BLD-MCNC: 8.9 G/DL
HGB BLD-MCNC: 9.1 G/DL
HGB BLD-MCNC: 9.1 G/DL
HGB BLD-MCNC: 9.3 G/DL
HGB FREE PLAS-MCNC: <30 MG/DL
HYPOCHROMIA BLD QL SMEAR: ABNORMAL
INR PPP: 1.3 INR
ISBT CODE: 5100
ISBT CODE: 6200
ISBT CODE: 6200
LACTATE BLDA-SCNC: 0.7 MMOL/L (ref 0.4–1.6)
LACTATE BLDA-SCNC: 0.8 MMOL/L (ref 0.4–1.6)
LACTATE BLDA-SCNC: 0.9 MMOL/L (ref 0.4–1.6)
LACTATE BLDA-SCNC: 1 MMOL/L (ref 0.4–1.6)
LACTATE BLDA-SCNC: 1.2 MMOL/L (ref 0.4–1.6)
LACTATE BLDA-SCNC: 1.5 MMOL/L (ref 0.4–1.6)
LDH SERPL L TO P-CCNC: 652 IU/L (ref 98–192)
LYMPHOCYTES # BLD: 0.68 K/UL (ref 1.2–3.5)
LYMPHOCYTES NFR BLD: 6 %
MAGNESIUM SERPL-MCNC: 2 MG/DL (ref 1.8–2.5)
MAGNESIUM SERPL-MCNC: 2.1 MG/DL (ref 1.8–2.5)
MAGNESIUM SERPL-MCNC: 2.3 MG/DL (ref 1.8–2.5)
MCH RBC QN AUTO: 28.4 PG (ref 28–33.2)
MCH RBC QN AUTO: 28.7 PG (ref 28–33.2)
MCH RBC QN AUTO: 28.8 PG (ref 28–33.2)
MCH RBC QN AUTO: 29.1 PG (ref 28–33.2)
MCH RBC QN AUTO: 29.3 PG (ref 28–33.2)
MCHC RBC AUTO-ENTMCNC: 31.8 G/DL (ref 32.2–36.5)
MCHC RBC AUTO-ENTMCNC: 32.3 G/DL (ref 32.2–36.5)
MCHC RBC AUTO-ENTMCNC: 32.3 G/DL (ref 32.2–36.5)
MCHC RBC AUTO-ENTMCNC: 32.9 G/DL (ref 32.2–36.5)
MCHC RBC AUTO-ENTMCNC: 33 G/DL (ref 32.2–36.5)
MCV RBC AUTO: 87.4 FL (ref 83–98)
MCV RBC AUTO: 88.7 FL (ref 83–98)
MCV RBC AUTO: 89.2 FL (ref 83–98)
MCV RBC AUTO: 89.5 FL (ref 83–98)
MCV RBC AUTO: 89.9 FL (ref 83–98)
MONOCYTES # BLD: 0.45 K/UL (ref 0.3–1)
MONOCYTES NFR BLD: 4 %
NEUTS BAND # BLD: 0.11 K/UL
NEUTS BAND # BLD: 10.05 K/UL (ref 1.7–7)
NEUTS BAND NFR BLD: 1 %
NEUTS SEG NFR BLD: 89 %
OVALOCYTES BLD QL SMEAR: ABNORMAL
P AXIS: 54
P AXIS: 59
P AXIS: 64
P AXIS: 69
PCO2 BLDA: 36 MM HG (ref 35–48)
PCO2 BLDA: 37 MM HG (ref 35–48)
PCO2 BLDA: 38 MM HG (ref 35–48)
PCO2 BLDA: 38 MM HG (ref 35–48)
PCO2 BLDA: 39 MM HG (ref 35–48)
PCO2 BLDA: 40 MM HG (ref 35–48)
PCO2 BLDA: 40 MM HG (ref 35–48)
PCO2 BLDA: 41 MM HG (ref 35–48)
PDW BLD AUTO: 10.4 FL (ref 9.4–12.4)
PDW BLD AUTO: 10.4 FL (ref 9.4–12.4)
PDW BLD AUTO: 10.7 FL (ref 9.4–12.4)
PDW BLD AUTO: 10.7 FL (ref 9.4–12.4)
PDW BLD AUTO: 11.1 FL (ref 9.4–12.4)
PH BLDA: 7.39 PH (ref 7.35–7.45)
PH BLDA: 7.4 PH (ref 7.35–7.45)
PH BLDA: 7.41 PH (ref 7.35–7.45)
PH BLDA: 7.42 PH (ref 7.35–7.45)
PH BLDA: 7.43 PH (ref 7.35–7.45)
PHOSPHATE SERPL-MCNC: 4 MG/DL (ref 2.4–4.7)
PLAT MORPH BLD: NORMAL
PLATELET # BLD AUTO: 73 K/UL
PLATELET # BLD AUTO: 79 K/UL
PLATELET # BLD AUTO: 81 K/UL
PLATELET # BLD AUTO: 85 K/UL
PLATELET # BLD AUTO: 94 K/UL
PLATELET # BLD EST: ABNORMAL 10*3/UL
PO2 BLDA: 103 MM HG (ref 83–100)
PO2 BLDA: 107 MM HG (ref 83–100)
PO2 BLDA: 120 MM HG (ref 83–100)
PO2 BLDA: 129 MM HG (ref 83–100)
PO2 BLDA: 153 MM HG (ref 83–100)
PO2 BLDA: 85 MM HG (ref 83–100)
PO2 BLDA: 88 MM HG (ref 83–100)
PO2 BLDA: 89 MM HG (ref 83–100)
PO2 BLDA: 89 MM HG (ref 83–100)
PO2 BLDA: 92 MM HG (ref 83–100)
POCT PATIENT TEMPERATURE: 98.6 °F (ref 97–99)
POCT TEST (BLD GAS): ABNORMAL
POLYCHROMASIA BLD QL SMEAR: ABNORMAL
POTASSIUM BLDA-SCNC: 3.9 MEQ/L (ref 3.4–4.5)
POTASSIUM BLDA-SCNC: 3.9 MEQ/L (ref 3.4–4.5)
POTASSIUM BLDA-SCNC: 4.1 MEQ/L (ref 3.4–4.5)
POTASSIUM BLDA-SCNC: 4.2 MEQ/L (ref 3.4–4.5)
POTASSIUM BLDA-SCNC: 4.3 MEQ/L (ref 3.4–4.5)
POTASSIUM BLDA-SCNC: 4.4 MEQ/L (ref 3.4–4.5)
POTASSIUM SERPL-SCNC: 4.2 MEQ/L (ref 3.6–5.1)
POTASSIUM SERPL-SCNC: 4.3 MEQ/L (ref 3.6–5.1)
POTASSIUM SERPL-SCNC: 4.4 MEQ/L (ref 3.6–5.1)
PR INTERVAL: 202
PR INTERVAL: 222
PR INTERVAL: 224
PR INTERVAL: 226
PRODUCT CODE: NORMAL
PRODUCT STATUS: NORMAL
PROT SERPL-MCNC: 4.4 G/DL (ref 6–8.2)
PROTHROMBIN TIME: 15.6 SEC (ref 12.2–14.5)
QRS DURATION: 86
QRS DURATION: 88
QRS DURATION: 92
QRS DURATION: 92
QRS DURATION: 94
QT INTERVAL: 384
QT INTERVAL: 388
QT INTERVAL: 408
QT INTERVAL: 432
QT INTERVAL: 468
QTC CALCULATION(BAZETT): 428
QTC CALCULATION(BAZETT): 440
QTC CALCULATION(BAZETT): 443
QTC CALCULATION(BAZETT): 453
QTC CALCULATION(BAZETT): 473
R AXIS: 61
R AXIS: 69
R AXIS: 69
R AXIS: 75
R AXIS: 82
RBC # BLD AUTO: 2.96 M/UL (ref 4.5–5.8)
RBC # BLD AUTO: 3.11 M/UL (ref 4.5–5.8)
RBC # BLD AUTO: 3.13 M/UL (ref 4.5–5.8)
RBC # BLD AUTO: 3.17 M/UL (ref 4.5–5.8)
RBC # BLD AUTO: 3.23 M/UL (ref 4.5–5.8)
SAO2 % BLDA: 97 % (ref 93–98)
SAO2 % BLDA: 98 % (ref 93–98)
SAO2 % BLDA: 98 % (ref 93–98)
SAO2 % BLDA: 99 % (ref 93–98)
SAO2 % BLDV: 65.3 % (ref 30–60)
SAO2 % BLDV: 65.8 % (ref 30–60)
SAO2 % BLDV: 72.6 % (ref 30–60)
SAO2 % BLDV: 73 % (ref 30–60)
SAO2 % BLDV: 74.8 % (ref 30–60)
SODIUM BLDA-SCNC: 133 MEQ/L (ref 136–145)
SODIUM BLDA-SCNC: 134 MEQ/L (ref 136–145)
SODIUM BLDA-SCNC: 135 MEQ/L (ref 136–145)
SODIUM BLDA-SCNC: 136 MEQ/L (ref 136–145)
SODIUM SERPL-SCNC: 134 MEQ/L (ref 136–144)
SODIUM SERPL-SCNC: 138 MEQ/L (ref 136–144)
SPECIMEN EXP DATE BLD: NORMAL
T WAVE AXIS: 54
T WAVE AXIS: 65
T WAVE AXIS: 68
T WAVE AXIS: 77
T WAVE AXIS: 81
UNIT ABO: NORMAL
UNIT ID: NORMAL
UNIT RH: POSITIVE
VENTRICULAR RATE: 54
VENTRICULAR RATE: 70
VENTRICULAR RATE: 72
VENTRICULAR RATE: 75
VENTRICULAR RATE: 82
WBC # BLD AUTO: 10.7 K/UL
WBC # BLD AUTO: 10.99 K/UL
WBC # BLD AUTO: 11.1 K/UL
WBC # BLD AUTO: 11.24 K/UL
WBC # BLD AUTO: 11.29 K/UL

## 2019-03-31 PROCEDURE — 63600000 HC DRUGS/DETAIL CODE: Performed by: PHYSICIAN ASSISTANT

## 2019-03-31 PROCEDURE — 25800000 HC PHARMACY IV SOLUTIONS: Performed by: PHYSICIAN ASSISTANT

## 2019-03-31 PROCEDURE — 80048 BASIC METABOLIC PNL TOTAL CA: CPT | Performed by: PHYSICIAN ASSISTANT

## 2019-03-31 PROCEDURE — P9035 PLATELET PHERES LEUKOREDUCED: HCPCS

## 2019-03-31 PROCEDURE — 33958 ECMO/ECLS REPOS PERPH CNULA: CPT | Performed by: INTERNAL MEDICINE

## 2019-03-31 PROCEDURE — 83735 ASSAY OF MAGNESIUM: CPT | Performed by: THORACIC SURGERY (CARDIOTHORACIC VASCULAR SURGERY)

## 2019-03-31 PROCEDURE — 85730 THROMBOPLASTIN TIME PARTIAL: CPT | Performed by: NURSE PRACTITIONER

## 2019-03-31 PROCEDURE — 25000000 HC PHARMACY GENERAL: Performed by: PHYSICIAN ASSISTANT

## 2019-03-31 PROCEDURE — 84100 ASSAY OF PHOSPHORUS: CPT | Performed by: THORACIC SURGERY (CARDIOTHORACIC VASCULAR SURGERY)

## 2019-03-31 PROCEDURE — 85025 COMPLETE CBC W/AUTO DIFF WBC: CPT | Performed by: THORACIC SURGERY (CARDIOTHORACIC VASCULAR SURGERY)

## 2019-03-31 PROCEDURE — 63700000 HC SELF-ADMINISTRABLE DRUG: Performed by: PHYSICIAN ASSISTANT

## 2019-03-31 PROCEDURE — 85730 THROMBOPLASTIN TIME PARTIAL: CPT | Performed by: THORACIC SURGERY (CARDIOTHORACIC VASCULAR SURGERY)

## 2019-03-31 PROCEDURE — 99291 CRITICAL CARE FIRST HOUR: CPT | Mod: 25 | Performed by: INTERNAL MEDICINE

## 2019-03-31 PROCEDURE — 85384 FIBRINOGEN ACTIVITY: CPT | Performed by: THORACIC SURGERY (CARDIOTHORACIC VASCULAR SURGERY)

## 2019-03-31 PROCEDURE — 71045 X-RAY EXAM CHEST 1 VIEW: CPT

## 2019-03-31 PROCEDURE — 93010 ELECTROCARDIOGRAM REPORT: CPT | Performed by: INTERNAL MEDICINE

## 2019-03-31 PROCEDURE — 80053 COMPREHEN METABOLIC PANEL: CPT | Performed by: THORACIC SURGERY (CARDIOTHORACIC VASCULAR SURGERY)

## 2019-03-31 PROCEDURE — 85027 COMPLETE CBC AUTOMATED: CPT | Performed by: CLINICAL NURSE SPECIALIST

## 2019-03-31 PROCEDURE — 85384 FIBRINOGEN ACTIVITY: CPT | Performed by: CLINICAL NURSE SPECIALIST

## 2019-03-31 PROCEDURE — 82810 BLOOD GASES O2 SAT ONLY: CPT | Performed by: CLINICAL NURSE SPECIALIST

## 2019-03-31 PROCEDURE — 94640 AIRWAY INHALATION TREATMENT: CPT

## 2019-03-31 PROCEDURE — 200200 PR NO CHARGE: Performed by: INTERNAL MEDICINE

## 2019-03-31 PROCEDURE — 83615 LACTATE (LD) (LDH) ENZYME: CPT | Performed by: THORACIC SURGERY (CARDIOTHORACIC VASCULAR SURGERY)

## 2019-03-31 PROCEDURE — 63600000 HC DRUGS/DETAIL CODE: Performed by: NURSE PRACTITIONER

## 2019-03-31 PROCEDURE — 83051 HEMOGLOBIN PLASMA: CPT | Performed by: THORACIC SURGERY (CARDIOTHORACIC VASCULAR SURGERY)

## 2019-03-31 PROCEDURE — 20000000 HC ROOM AND CARE ICU

## 2019-03-31 PROCEDURE — 82810 BLOOD GASES O2 SAT ONLY: CPT | Performed by: THORACIC SURGERY (CARDIOTHORACIC VASCULAR SURGERY)

## 2019-03-31 PROCEDURE — 25000000 HC PHARMACY GENERAL

## 2019-03-31 PROCEDURE — 83735 ASSAY OF MAGNESIUM: CPT | Performed by: PHYSICIAN ASSISTANT

## 2019-03-31 PROCEDURE — P9016 RBC LEUKOCYTES REDUCED: HCPCS

## 2019-03-31 PROCEDURE — 85610 PROTHROMBIN TIME: CPT | Performed by: THORACIC SURGERY (CARDIOTHORACIC VASCULAR SURGERY)

## 2019-03-31 PROCEDURE — 63700000 HC SELF-ADMINISTRABLE DRUG: Performed by: NURSE PRACTITIONER

## 2019-03-31 PROCEDURE — 83051 HEMOGLOBIN PLASMA: CPT | Performed by: CLINICAL NURSE SPECIALIST

## 2019-03-31 PROCEDURE — 48000013 HC VENTRICULAR ASSIST, ECMO HOURLY

## 2019-03-31 PROCEDURE — 84132 ASSAY OF SERUM POTASSIUM: CPT | Performed by: THORACIC SURGERY (CARDIOTHORACIC VASCULAR SURGERY)

## 2019-03-31 PROCEDURE — 93010 ELECTROCARDIOGRAM REPORT: CPT | Mod: 76 | Performed by: INTERNAL MEDICINE

## 2019-03-31 PROCEDURE — 36415 COLL VENOUS BLD VENIPUNCTURE: CPT | Performed by: THORACIC SURGERY (CARDIOTHORACIC VASCULAR SURGERY)

## 2019-03-31 RX ORDER — IPRATROPIUM BROMIDE AND ALBUTEROL SULFATE 2.5; .5 MG/3ML; MG/3ML
3 SOLUTION RESPIRATORY (INHALATION) EVERY 6 HOURS
Status: DISCONTINUED | OUTPATIENT
Start: 2019-03-31 | End: 2019-04-11 | Stop reason: HOSPADM

## 2019-03-31 RX ORDER — ACETYLCYSTEINE 200 MG/ML
3 SOLUTION ORAL; RESPIRATORY (INHALATION)
Status: DISCONTINUED | OUTPATIENT
Start: 2019-03-31 | End: 2019-04-05

## 2019-03-31 RX ORDER — GUAIFENESIN 600 MG/1
1200 TABLET, EXTENDED RELEASE ORAL 2 TIMES DAILY
Status: DISCONTINUED | OUTPATIENT
Start: 2019-03-31 | End: 2019-04-06

## 2019-03-31 RX ORDER — BUMETANIDE 0.25 MG/ML
1 INJECTION, SOLUTION INTRAMUSCULAR; INTRAVENOUS 2 TIMES DAILY
Status: DISCONTINUED | OUTPATIENT
Start: 2019-03-31 | End: 2019-04-01

## 2019-03-31 RX ORDER — SODIUM CHLORIDE 9 MG/ML
5 INJECTION, SOLUTION INTRAVENOUS AS NEEDED
Status: ACTIVE | OUTPATIENT
Start: 2019-03-31 | End: 2019-04-01

## 2019-03-31 RX ORDER — FUROSEMIDE 10 MG/ML
40 INJECTION INTRAMUSCULAR; INTRAVENOUS ONCE
Status: COMPLETED | OUTPATIENT
Start: 2019-03-31 | End: 2019-03-31

## 2019-03-31 RX ORDER — OXYCODONE HYDROCHLORIDE 5 MG/1
10 TABLET ORAL EVERY 6 HOURS PRN
Status: DISCONTINUED | OUTPATIENT
Start: 2019-03-31 | End: 2019-04-11 | Stop reason: HOSPADM

## 2019-03-31 RX ORDER — AMIODARONE HYDROCHLORIDE 200 MG/1
400 TABLET ORAL 2 TIMES DAILY
Status: DISCONTINUED | OUTPATIENT
Start: 2019-04-01 | End: 2019-04-04

## 2019-03-31 RX ORDER — BUMETANIDE 0.25 MG/ML
INJECTION, SOLUTION INTRAMUSCULAR; INTRAVENOUS
Status: COMPLETED
Start: 2019-03-31 | End: 2019-03-31

## 2019-03-31 RX ORDER — OXYCODONE HYDROCHLORIDE 5 MG/1
5 TABLET ORAL ONCE
Status: COMPLETED | OUTPATIENT
Start: 2019-03-31 | End: 2019-03-31

## 2019-03-31 RX ADMIN — STANDARDIZED SENNA CONCENTRATE 1 TABLET: 8.6 TABLET ORAL at 09:44

## 2019-03-31 RX ADMIN — GUAIFENESIN 1200 MG: 600 TABLET, EXTENDED RELEASE ORAL at 09:45

## 2019-03-31 RX ADMIN — MAGNESIUM SULFATE 2 G: 2 INJECTION INTRAVENOUS at 22:44

## 2019-03-31 RX ADMIN — FUROSEMIDE 40 MG: 10 INJECTION, SOLUTION INTRAMUSCULAR; INTRAVENOUS at 18:46

## 2019-03-31 RX ADMIN — CALCIUM CHLORIDE 1 G: 100 INJECTION, SOLUTION INTRAVENOUS at 00:31

## 2019-03-31 RX ADMIN — MULTIPLE VITAMINS W/ MINERALS TAB 1 TABLET: TAB at 09:32

## 2019-03-31 RX ADMIN — FAMOTIDINE 20 MG: 20 TABLET, FILM COATED ORAL at 09:43

## 2019-03-31 RX ADMIN — ACETAMINOPHEN 975 MG: 325 TABLET ORAL at 17:07

## 2019-03-31 RX ADMIN — DEXTROSE 1 MG/MIN: 5 SOLUTION INTRAVENOUS at 01:55

## 2019-03-31 RX ADMIN — DOBUTAMINE IN DEXTROSE 4 MCG/KG/MIN: 200 INJECTION, SOLUTION INTRAVENOUS at 11:26

## 2019-03-31 RX ADMIN — FOLIC ACID 1 MG: 1 TABLET ORAL at 09:32

## 2019-03-31 RX ADMIN — ASPIRIN 81 MG: 81 TABLET, COATED ORAL at 09:32

## 2019-03-31 RX ADMIN — OXYCODONE HYDROCHLORIDE 10 MG: 5 TABLET ORAL at 15:24

## 2019-03-31 RX ADMIN — DOCUSATE SODIUM 100 MG: 100 CAPSULE, LIQUID FILLED ORAL at 09:00

## 2019-03-31 RX ADMIN — CHLORHEXIDINE GLUCONATE 15 ML: 1.2 RINSE ORAL at 22:00

## 2019-03-31 RX ADMIN — DOCUSATE SODIUM 100 MG: 100 CAPSULE, LIQUID FILLED ORAL at 20:23

## 2019-03-31 RX ADMIN — MIDAZOLAM HYDROCHLORIDE 2 MG: 1 INJECTION, SOLUTION INTRAMUSCULAR; INTRAVENOUS at 20:35

## 2019-03-31 RX ADMIN — POTASSIUM CHLORIDE 20 MEQ: 200 INJECTION, SOLUTION INTRAVENOUS at 20:34

## 2019-03-31 RX ADMIN — ACETAMINOPHEN 975 MG: 325 TABLET ORAL at 00:32

## 2019-03-31 RX ADMIN — OXYCODONE HYDROCHLORIDE 5 MG: 5 TABLET ORAL at 06:59

## 2019-03-31 RX ADMIN — KETOROLAC TROMETHAMINE 30 MG: 30 INJECTION, SOLUTION INTRAMUSCULAR; INTRAVENOUS at 09:33

## 2019-03-31 RX ADMIN — POTASSIUM CHLORIDE 20 MEQ: 750 TABLET, FILM COATED, EXTENDED RELEASE ORAL at 09:43

## 2019-03-31 RX ADMIN — MIDAZOLAM HYDROCHLORIDE 2 MG: 1 INJECTION, SOLUTION INTRAMUSCULAR; INTRAVENOUS at 03:34

## 2019-03-31 RX ADMIN — POTASSIUM CHLORIDE 20 MEQ: 200 INJECTION, SOLUTION INTRAVENOUS at 10:04

## 2019-03-31 RX ADMIN — ROSUVASTATIN CALCIUM 10 MG: 10 TABLET, FILM COATED ORAL at 17:07

## 2019-03-31 RX ADMIN — KETOROLAC TROMETHAMINE 30 MG: 30 INJECTION, SOLUTION INTRAMUSCULAR; INTRAVENOUS at 20:34

## 2019-03-31 RX ADMIN — IPRATROPIUM BROMIDE AND ALBUTEROL SULFATE 3 ML: 2.5; .5 SOLUTION RESPIRATORY (INHALATION) at 14:53

## 2019-03-31 RX ADMIN — ACETYLCYSTEINE 600 MG: 200 INHALANT RESPIRATORY (INHALATION) at 19:15

## 2019-03-31 RX ADMIN — ACETAMINOPHEN 975 MG: 325 TABLET ORAL at 06:57

## 2019-03-31 RX ADMIN — POTASSIUM CHLORIDE 20 MEQ: 750 TABLET, FILM COATED, EXTENDED RELEASE ORAL at 20:23

## 2019-03-31 RX ADMIN — POTASSIUM CHLORIDE 20 MEQ: 200 INJECTION, SOLUTION INTRAVENOUS at 05:29

## 2019-03-31 RX ADMIN — OXYCODONE HYDROCHLORIDE 5 MG: 5 TABLET ORAL at 03:34

## 2019-03-31 RX ADMIN — CHLORHEXIDINE GLUCONATE 15 ML: 1.2 RINSE ORAL at 10:00

## 2019-03-31 RX ADMIN — BUMETANIDE 1 MG: 0.25 INJECTION INTRAMUSCULAR; INTRAVENOUS at 06:57

## 2019-03-31 RX ADMIN — HEPARIN SODIUM AND DEXTROSE 850 UNITS/HR: 10000; 5 INJECTION INTRAVENOUS at 15:21

## 2019-03-31 RX ADMIN — POTASSIUM CHLORIDE 20 MEQ: 200 INJECTION, SOLUTION INTRAVENOUS at 02:53

## 2019-03-31 RX ADMIN — COLCHICINE 0.6 MG: 0.6 TABLET, FILM COATED ORAL at 09:32

## 2019-03-31 RX ADMIN — Medication 100 MG: at 09:32

## 2019-03-31 RX ADMIN — CALCIUM CHLORIDE 1 G: 100 INJECTION, SOLUTION INTRAVENOUS at 11:27

## 2019-03-31 RX ADMIN — FAMOTIDINE 20 MG: 10 INJECTION INTRAVENOUS at 20:23

## 2019-03-31 RX ADMIN — ACETAMINOPHEN 975 MG: 325 TABLET ORAL at 11:11

## 2019-03-31 RX ADMIN — IPRATROPIUM BROMIDE AND ALBUTEROL SULFATE 3 ML: 2.5; .5 SOLUTION RESPIRATORY (INHALATION) at 19:14

## 2019-03-31 RX ADMIN — KETOROLAC TROMETHAMINE 30 MG: 30 INJECTION, SOLUTION INTRAMUSCULAR; INTRAVENOUS at 03:34

## 2019-03-31 RX ADMIN — GUAIFENESIN 1200 MG: 600 TABLET, EXTENDED RELEASE ORAL at 20:30

## 2019-03-31 NOTE — PLAN OF CARE
Problem: Pressure Ulcer Risk (Justin Scale) (Adult,Obstetrics,Pediatric)  Goal: Identify Related Risk Factors and Signs and Symptoms   03/31/19 1410   Pressure Ulcer Risk (Justin Scale)   Related Risk Factors (Pressure Ulcer Risk (Justin Scale)) critical care admission      03/31/19 1410   Pressure Ulcer Risk (Justin Scale)   Related Risk Factors (Pressure Ulcer Risk (Justin Scale)) critical care admission;medical devices

## 2019-03-31 NOTE — PROGRESS NOTES
Daily Progress Note    Subjective    Interval History: No new events overnight.  Pt denies Cp, SOB, Abd. Pain, N/V.  Pt admits to tolerating PO diet, voiding via gambino, and flatus.       Current Facility-Administered Medications   Medication Dose Route Frequency Provider Last Rate Last Dose   • acetaminophen (TYLENOL) tablet 975 mg  975 mg oral q6h Gerardo Hyde CRNP   975 mg at 03/31/19 0032   • albumin human 5 % solution 500 mL  500 mL intravenous PRN Michell Kirkland PA C   500 mL at 03/29/19 2130   • alum-mag hydroxide-simeth (MAALOX) 200-200-20 mg/5 mL suspension 30 mL  30 mL oral q4h PRN Michell Kirkland PA C       • amiodarone (CORDARONE) 750 mg in dextrose 5 % 500 mL (1.5 mg/mL) infusion  1 mg/min intravenous Continuous Juventino Parnell PA C        Followed by   • amiodarone (CORDARONE) 750 mg in dextrose 5 % 500 mL (1.5 mg/mL) infusion  0.5 mg/min intravenous Continuous Juventino Parnell PA C       • amiodarone (CORDARONE) bolus from bag 150 mg  150 mg intravenous Once Juventino Parnell PA C       • [START ON 4/1/2019] amiodarone (PACERONE) tablet 400 mg  400 mg oral BID Juventino Parnell PA C       • aspirin enteric coated tablet 81 mg  81 mg oral Daily Michell Kirkland PA C   81 mg at 03/30/19 0943   • atropine injection 0.5 mg  0.5 mg intravenous q5 min PRN Michell Kirkland PA C       • bisacodyl (DULCOLAX) 10 mg suppository 10 mg  10 mg rectal Daily PRN Michell Kirkland PA C       • [START ON 4/1/2019] bisacodyl (DULCOLAX) 10 mg suppository 10 mg  10 mg rectal PRN Michell Kirkland PA C       • calcium chloride 1 g in sodium chloride 0.9 % 50 mL IVPB  1 g intravenous q6h PRN Michell Kirkland PA C 100 mL/hr at 03/31/19 0031 1 g at 03/31/19 0031   • chlorhexidine (PERIDEX) 0.12 % mouthwash 15 mL  15 mL Mouth/Throat 2 times per day Michell Kirkland PA C   15 mL at 03/30/19 2144   • colchicine (COLCRYS) tablet 0.6 mg  0.6 mg oral Daily Michell Kirkland  RAPHAEL Patton   0.6 mg at 03/30/19 0943   • insulin regular (HumuLIN R,NovoLIN R) 100 Units in sodium chloride 0.9 % 100 mL (1 Units/mL) infusion  0-15 Units/hr intravenous Titrated Michell Kirkland PA C 5.94 mL/hr at 03/31/19 0000 5.94 Units/hr at 03/31/19 0000    And   • dextrose in water injection 5-15 g  10-30 mL intravenous PRN Michell Kirkland PA C       • diphenhydrAMINE (BENADRYL) capsule 25 mg  25 mg oral q6h PRN Michell Kirkland PA C        Or   • diphenhydrAMINE (BENADRYL) injection 25 mg  25 mg intravenous q6h PRN Michell Kirkland PA C       • DOBUTamine (DOBUTREX) infusion 500 mg/250 mL D5W (2,000 mcg/mL)  4 mcg/kg/min intravenous Continuous Gerardo Beth CRNP 13.56 mL/hr at 03/31/19 0000 4 mcg/kg/min at 03/31/19 0000   • docusate sodium (COLACE) capsule 100 mg  100 mg oral BID Michell Kirkladn PA C   100 mg at 03/30/19 2025   • famotidine (PEPCID) injection 20 mg  20 mg intravenous BID Michell Kirkland PA C   20 mg at 03/30/19 2025    Or   • famotidine (PEPCID) tablet 20 mg  20 mg oral BID Michell Kirkland PA C       • folic acid (FOLVITE) tablet 1 mg  1 mg oral Daily Gerardo Beth CRNP   1 mg at 03/30/19 1345   • furosemide (LASIX) injection 40 mg  40 mg intravenous BID (9a, 4p) Gerardo Beth CRNP   40 mg at 03/30/19 1815   • heparin infusion in D5W 100 units/mL  0-4,000 Units/hr intravenous Titrated Juventino Parnell PA C 8 mL/hr at 03/31/19 0000 800 Units/hr at 03/31/19 0000   • HYDROmorphone (DILAUDID) 1 mg/mL injection 0.5 mg  0.5 mg intravenous q1h PRN Gerardo Beth CRNP   0.5 mg at 03/30/19 1719   • insulin aspart U-100 (NovoLOG) pen 3 Units  3 Units subcutaneous TID with meals Juventino Parnell PA C       • ketorolac (TORADOL) injection 30 mg  30 mg intravenous q6h PRN Michell Kirkland PA C   30 mg at 03/30/19 2135   • lorlatinib tablet 75 mg  75 mg oral Daily Gerardo Beth CRNP   75 mg at 03/30/19 1733   • [START ON 4/2/2019]  magnesium oxide (MAG-OX) tablet 400 mg  400 mg oral BID Michell Kirkland PA C       • magnesium sulfate IVPB 2g in 50 mL NSS/D5W/SWFI  2 g intravenous PRN Michell Kirkland PA C       • midazolam (VERSED) 1 mg/mL injection 2 mg  2 mg intravenous q4h PRN Juventino Parnell PA C   2 mg at 03/30/19 2153   • multivitamin tablet 1 tablet  1 tablet oral Daily Michell Kirkland PA C       • ondansetron ODT (ZOFRAN-ODT) disintegrating tablet 4 mg  4 mg oral q8h PRN Michell Kirkland PA C        Or   • ondansetron (ZOFRAN) injection 4 mg  4 mg intravenous q8h PRN Michell Kirkland PA C       • oxyCODONE (ROXICODONE) immediate release tablet 5 mg  5 mg oral q4h PRN Gerardo Beth CRNP   5 mg at 03/30/19 2152   • potassium chloride (KLOR-CON) tablet extended release 20 mEq  20 mEq oral BID Michell Kirkland PA C       • potassium chloride 20 mEq in 100 mL IVPB  (premix)  20 mEq intravenous q8h PRN Michell Kirkland PA C   Stopped at 03/30/19 1332   • rosuvastatin (CRESTOR) tablet 10 mg  10 mg oral Daily (6p) Michell Kirkland PA C       • senna (SENOKOT) tablet 1 tablet  1 tablet oral 2x daily PRN Michell Kirkland PA C       • senna (SENOKOT) tablet 1 tablet  1 tablet oral BID Michell Kirkland PA C   1 tablet at 03/30/19 2025   • sodium bicarbonate 8.4 % (1 mEq/mL) injection 45-90 mEq  45-90 mEq intravenous PRN Michell Kirkland PA C       • sodium chloride 0.9 % infusion   intravenous Continuous Georgi Pepe MD 15 mL/hr at 03/31/19 0000     • thiamine (VITAMIN B1) tablet 100 mg  100 mg oral Daily Gerardo Beth CRNP   100 mg at 03/30/19 1345   • vasopressin (PITRESSIN) 20 Units in sodium chloride 0.9 % 100 mL (0.2 Units/mL) infusion  0.01-0.1 Units/min intravenous Continuous Monica Castillo PA C   Stopped at 03/30/19 1029     No Known Allergies      Objective    Vital signs in last 24 hours:  Heart Rate:  [67-84] 69  Resp:  [18-28] 22  FiO2 (%) (Set):   [40 %-100 %] 100 %      Intake/Output Summary (Last 24 hours) at 03/31/19 0153  Last data filed at 03/31/19 0000   Gross per 24 hour   Intake          3663.06 ml   Output             3055 ml   Net           608.06 ml         Physical Exam:  /85   Pulse 69   Temp 36.2 °C (97.1 °F) (Temporal)   Resp (!) 22   Ht 1.829 m (6')   Wt 116 kg (256 lb 13.4 oz)   SpO2 93%   BMI 34.83 kg/m²     General Appearance:   Neuro: Alert, cooperative, no distress, appears stated age    Normal strength, sensation and reflexes throughout   Lungs:   Clear to auscultation bilaterally, decreased at bases, respirations unlabored   Heart:  Regular rate and rhythm, S1 and S2 normal, no murmur, rub or gallop   Abdomen:   Soft, non-tender, bowel sounds active all four quadrants, no masses, no organomegaly   Extremities: Extremities normal, atraumatic, no cyanosis.   Trace edema   Pulses: 2+ and symmetric all extremities   Skin:  Incisions:  Skin color, texture, turgor normal, no rashes or lesions  Clean, dry, and intact       Labs  Lab Results   Component Value Date    WBC 12.97 (H) 03/30/2019    HGB 8.7 (L) 03/30/2019    HCT 27.3 (L) 03/30/2019    PLT 95 (L) 03/30/2019    ALT 48 03/30/2019     (H) 03/30/2019     03/30/2019    K 4.2 03/30/2019     (H) 03/30/2019    CREATININE 1.3 03/30/2019    BUN 16 03/30/2019    CO2 21 (L) 03/30/2019    INR 1.1 03/30/2019    HGBA1C 5.6 03/29/2019       Imaging  I have independently reviewed the pertinent imaging from the last 24 hrs.    ECG/Telemetry  I have independently reviewed the telemetry. No events for the last 24 hours.    VTE Assessment: I have reassessed and the patient's VTE risk and treatment plan is appropriate.            Assessment/Plan   * Mitral valve insufficiency   Assessment & Plan    Status post mitral valve repair with a number 31 mm annuloplasty band and dora-chord repair x2 on 3/29/2019 the patient did have some RV dysfunction intraoperatively which  required insertion of a Protec 2 oh with an oxygenator via the right subclavian vein.  -Intraoperatively the patient did require 4 units of packed red blood cells, 4 platelets, 20 cryo   -Patient was extubated on 3/30/2019 with Protec still in place and the oxygenator at 50%    Wean dobutamine drip as tolerates  Continue heparin drip for a Protec duo as well as his mitral valve repair  Patient will resume Coumadin once a Protec is removed  Transition off insulin drip today  We will attempt to remove his femoral A-line today if his radial A-line continues to work appropriately we will attempt to bulb  We will attempt to bulb his chest tube again today that had an air leak  Continue right IJ, chest tubes, AV wires, Ceron continue aspirin  Continue aspirin, Crestor  Will resume beta-blocker once off the dobutamine  Continue Lasix 40 IV twice daily  Continue colchicine for PPS prevention   Continue Pepcid for GI prophylaxis  Encourage I-S, ambulation and out of bed to chair when able          CHF (congestive heart failure), NYHA class III, chronic, diastolic (CMS/HCC) (HCC)   Assessment & Plan    His preop ejection fraction is 45%    Resume Toprol when off of dobutamine   Continue Crestor  Continue Lasix 40 IV twice daily  Holding home lisinopril during the perioperative phase          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)   Assessment & Plan    Patient with a history of metastatic stage IV lung cancer with metastatic disease to the brain status post chemo and radiation (brain only)    Continue his home Lorlatinib        Hyperlipidemia   Assessment & Plan    Continue home Crestor        HTN (hypertension)   Assessment & Plan    Resume Toprol when off of dobutamine  Holding home lisinopril during the perioperative phase                 Expected Discharge Date:  4/3/2019 No discharge date for patient encounter.    RAPHAEL Velez  3/31/2019

## 2019-03-31 NOTE — ASSESSMENT & PLAN NOTE
His preop ejection fraction is 45%    Continue Crestor  Continue Lasix 40 IV  daily  Holding home lisinopril during the perioperative phase  Repeating echo today

## 2019-03-31 NOTE — PROGRESS NOTES
ECMO Daily Assessment and Management Procedure    Type: ProtekDuo Right ventricular assist device    Indication: Cardiogenic Shock-Right ventricle    Drainage Cannula:Right Subclavian Vein 31Fr    Return Cannula:same as drainage(double lumen) cannula 31Fr    :50% 2 l/min    Pre-pressure 227 , , delta pressure 27  Circuit flow 4 L/min at 3527 RPM    Circuit fully inspected from drainage to return cannula. Fibrin visible in oxygenator - post membrane.   Thrombus visible in oxygenator - pre membrane. There are no tubing kinks. The age of the circuit lines is 3 days.    I repositioned cannula to arm to reduce a mild kink in the cannula        Cardiology Daily Progress Note    Subjective   Wesley Fields is a 58 y.o. male who was admitted for Mitral valve insufficiency, unspecified etiology [I34.0]  Mitral regurgitation [I34.0].    Interval History: Extubated Awake alert      Objective     Vital signs in last 24 hours:  Heart Rate:  [] 71  Resp:  [20-28] 22  FiO2 (%) (Set):  [80 %] 80 %      Intake/Output Summary (Last 24 hours) at 03/31/19 1758  Last data filed at 03/31/19 1715   Gross per 24 hour   Intake          4740.79 ml   Output             2550 ml   Net          2190.79 ml     Intake/Output this shift:  I/O this shift:  In: 1500.6 [P.O.:350; I.V.:625.6; Blood:525]  Out: 1070 [Urine:850; Chest Tube:220]    Labs  Lab Results   Component Value Date    WBC 10.99 (H) 03/31/2019    HGB 9.1 (L) 03/31/2019    HCT 27.6 (L) 03/31/2019    PLT 79 (L) 03/31/2019    ALT 56 03/31/2019     (H) 03/31/2019     (L) 03/31/2019    K 4.3 03/31/2019     03/31/2019    CREATININE 1.2 03/31/2019    BUN 15 03/31/2019    CO2 20 (L) 03/31/2019    INR 1.3 03/31/2019    HGBA1C 5.6 03/29/2019     Troponin I Results     No lab values to display.          Imaging  I have independently reviewed the pertinent imaging from the last 24 hrs.    ECG   sinus rhythm    Telemetry  sinus rhythm      Physical Exam:  General  Appearance:  Alert, no distress   Head:  Normocephalic, without obvious abnormality, atraumatic   Eyes:  Conjunctiva/corneas clear, EOM's intact   Neck: No thyroid enlargement. No JVD. No bruits    Lungs:   Decreased breath sounds at the bases   Heart:  Regular rhythm,Soft systolic murmur   Abdomen:   Soft, non-tender, no masses, no organomegaly   Vascular: Pulses 2+ and symmetric all extremities, no carotid bruit or jugular vein distention   Musculoskeletal:  Skin: No injury or deformity  Skin color, texture, turgor normal, no rashes or lesions, no cyanosis    Extremities: No edema   Behavior/Emotional: Appropriate, cooperative   Neurologic:                          sedated follow commands      Assessment/Plan   No new Assessment & Plan notes have been filed under this hospital service since the last note was generated.  Service: Cardiac, Interventional    Plan  Tomorrow try to wean from oxygenator portion into pure RVAD  Incentive Spirometry needed based on CXR  Discussed all issues with family and team  Kink removed from cannula resutured  Discussed anticoagulation with team        cct 45 mins           Juan M Victoria, DO  3/31/2019

## 2019-03-31 NOTE — ASSESSMENT & PLAN NOTE
Holding home lisinopril during the perioperative phase  breanna Klein regarding restarting afterload reductionat DC

## 2019-03-31 NOTE — ASSESSMENT & PLAN NOTE
Status post mitral valve repair with a number 31 mm annuloplasty band and dora-chord repair x2 on 3/29/2019 the patient did have some RV dysfunction intraoperatively which required insertion of a Protec 2 oh with an oxygenator via the right subclavian vein.  -Intraoperatively the patient did require 4 units of packed red blood cells, 4 platelets, 20 cryo   -Patient was extubated on 3/30/2019 with Protec still in place and the oxygenator at 50%    Plan to wean Protek duo Monday to re eval need   Sildanifil 20 TID for some pulm hypertension - held since 4/10 with MVO2 @ 57 in the evening. Will repeat echo today 4/11 and Dr. Klein will eval  Continue heparin drip for a Protec duo as well as his mitral valve repair  Patient will resume Coumadin once a Protec is removed  Continue aspirin, Crestor  Continue Lasix 40 PO  daily  Continue colchicine for PPS prevention   Continue Pepcid for GI prophylaxis  Encourage I-S, ambulation and out of bed to chair when able

## 2019-03-31 NOTE — CONSULTS
Pulmonary Consult Note     Indication for Consultation:  Hypoxia    Requesting Physician:  Dr. Pepe    Consulting Physician:  Dr. Mitchell    Primary Care Physician:  Dr. Muir     HISTORY OF PRESENT ILLNESS        58-year-old male with history of stage IV non-small cell lung cancer with metastases to the brain, status post radiation to the brain, currently on lorlatinib, CHF, hypertension, hyperlipidemia, recent diagnosis of jaw osteonecrosis presented on 3/29/2019 for repair of his mitral valve.  He began exhibiting symptoms of dyspnea on exertion in November 2018, murmur was noted and work-up revealed mitral regurgitation and fluid overload which worsened, prompting mitral valve repair.    Patient underwent mitral valve repair on 3/29/2019.  He was noted to be hypoxic preoperatively in OR to the 80s.  He was noted to have a right-sided pleural effusion which was drained with about 1 L of fluid removed prior to start of mitral valve repair.  After cardiopulmonary bypass was discontinued intraoperatively, right ventricle was noted to be hypokinetic and ST elevations were noted in the inferior leads.  Protek duo was then placed prior to leaving the OR.  Patient remained intubated and had Protek duo with oxygenator postoperatively.  He continued to have right ventricular failure and decision was made yesterday to extubate to improve hemodynamics.  Patient now remains hypoxic with high supplemental oxygen requirements, currently on 10 L nasal cannula, as well as oxygenator on Protek duo with FiO2 of 100%.  Pulmonology was consulted regarding patient's persistent hypoxia.    Patient was seen and examined at bedside this morning.  He reports that he is not feeling pain, but rather some back discomfort related to lying in bed for the past couple of days.  He has not been using his incentive spirometer since extubation, but realizes that he needs to start using it today.  He reports that he has also been  trying to avoid coughing out of concern for displacement of catheters.  He reports very mild shortness of breath.    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  •  furosemide, Take 40 mg by mouth 2 (two) times a day.  •  lorlatinib, Take 75 mg by mouth daily.  •  metoprolol succinate XL, Take 25 mg by mouth daily.  •  mupirocin, Apply 1 application topically 2 (two) times a day for 5 days. Nasal application, starting 5 days before surgery  •  potassium chloride (KLOR-CON ORAL), Take 10 mEq by mouth. Every other day    •  rosuvastatin, Take 10 mg by mouth daily.  •  lisinopril, Take 20 mg by mouth 2 (two) times a day.    Current Medications:    acetaminophen 975 mg oral q6h RIGOBERTO   amiodarone 150 mg intravenous Once   [START ON 4/1/2019] amiodarone 400 mg oral BID   aspirin 81 mg oral Daily   bumetanide 1 mg intravenous BID   chlorhexidine 15 mL Mouth/Throat 2 times per day   colchicine 0.6 mg oral Daily   docusate sodium 100 mg oral BID   famotidine 20 mg intravenous BID   Or      famotidine 20 mg oral BID   folic acid 1 mg oral Daily   guaiFENesin 1,200 mg oral BID   insulin aspart U-100 3 Units subcutaneous TID with meals   lorlatinib 75 mg oral Daily   [START ON 4/2/2019] magnesium oxide 400 mg oral BID   multivitamin 1 tablet oral  Daily   potassium chloride 20 mEq oral BID   rosuvastatin 10 mg oral Daily (6p)   senna 1 tablet oral BID   thiamine 100 mg oral Daily         ALLERGIES        Patient has no known allergies.    FAMILY HISTORY        Family History   Problem Relation Age of Onset   • COPD Mother    • Multiple myeloma Mother    • Lung cancer Father    • Cancer Sister         thyroid         SOCIAL HISTORY        Social History   Substance Use Topics   • Smoking status: Never Smoker   • Smokeless tobacco: Never Used   • Alcohol use Yes      Comment: 2-3 beers/day         REVIEW OF SYSTEMS        A Full 10 point review of systems was obtained and is negative then otherwise stated in the HPI    PHYSICAL EXAMINATION      Vital Signs:   /85   Pulse 70   Temp 36.2 °C (97.1 °F) (Temporal)   Resp (!) 22   Ht 1.829 m (6')   Wt 113 kg (249 lb 12.5 oz)   SpO2 100%   BMI 33.88 kg/m²     I/O's:    Intake/Output Summary (Last 24 hours) at 03/31/19 1146  Last data filed at 03/31/19 1100   Gross per 24 hour   Intake          4643.94 ml   Output             2600 ml   Net          2043.94 ml       General: The patient appears mildly winded.  Resting comfortably in bed.  HEENT: Mucous membranes are moist.  Sclera are anicteric.  Neck: Supple.  No cervical lymphadenopathy, Device lines in place  Cardiovascular: S1 and S2 are present.  There are no murmurs.  Lungs: mild bilateral rhonchi and rales, decreased breath sounds at the bases  Abdomen: Soft, nontender and nondistended  Extremities: 2+ pedal edema  Neuro: Awake and alert.  Spontaneously moving all extremities    Diagnostic Data      Labs:    I have personally reviewed all pertinent patient laboratory results. Labs of note discussed below:    ABG Results       03/31/19 03/31/19 03/31/19                    0957 0742 0516         pH 7.42 7.41 7.39         pCO2 37 39 41         pO2 85 88 129 (H)         HCO3 24 25 25         Base Excess -0.3 0.1 -0.2                     BMP Results        03/31/19 03/30/19 03/30/19                    0353 2212 2158          - -         K 4.2 4.2 4.2         Cl 108 - -         CO2 22 - -         Glucose 124 (H) - -         BUN 16 - -         Creatinine 1.2 - -         Calcium 8.5 (L) - -         Anion Gap 8 - -         EGFR &gt;60.0 - -         Comment for K at 2212 on 03/30/19:    Results obtained on plasma. Plasma Potassium values may be up to 0.4 mEQ/L less than serum values. The differences may be greater for patients with high platelet or white cell counts.        CBC Results       03/31/19 03/31/19 03/30/19                    0747 0513 2303         WBC 11.24 (H) 11.29 (H) 12.97 (H)         RBC 3.13 (L) 3.17 (L) 3.07 (L)         HGB 8.9 (L) 9.1 (L) 8.7 (L)         HCT 28.0 (L) 27.7 (L) 27.3 (L)         MCV 89.5 87.4 88.9         MCH 28.4 28.7 28.3         MCHC 31.8 (L) 32.9 31.9 (L)         PLT 85 (L) 81 (L) 95 (L)         Comment for HGB at 0513 on 03/31/19:  RESULT CHECKED    Comment for HGB at 2303 on 03/30/19:  RESULT CHECKED    Comment for PLT at 0747 on 03/31/19:  CONSISTENT WITH PREVIOUS RESULTS    Comment for PLT at 0513 on 03/31/19:  RESULTS CHECKED. RESULTS OBTAINED AFTER VORTEXING TO ELIMINATE PLT CLUMPS    Comment for PLT at 2303 on 03/30/19:  RESULTS CHECKED. RESULTS OBTAINED AFTER VORTEXING TO ELIMINATE PLT CLUMPS        Imaging:    I have reviewed all pertinent imaging which is discussed below:    CXR 3/31/2019: Low lung volumes, pulmonary edema R>L    SAURABH 3/29/2019:        Pre bypass: Normal LV size and low normal function. Estimated EF 50-55%. There is prolapse of the P2 scallop of the mitral leaflet with severe, anteriorly directed mitral regurgitation. The trans mitral gradient is 2mmHg. The aortic valve is trileaflet and mildly thickened. No stenosis. Trace aortic regurgitation. The RV is normal in size and function. TAPSE 1.8cm. Mild tricuspid regurgitation. The pulmonic valve is not well visualized. There is a PFO with left to right  flow. No thrombus identified in the left atrial appendage. Grade I atheroma of the ascending aorta, grade II of the descending aorta.  There is a pleural effusion on the left.       Post MV repair: There is severe RV dysfunction. A Protek Duo cannula was placed in the RV and was later repositioned and advanced further into the PA. The PFO is still present and demonstrates right to left flow in the setting of RV dysfunction. The LV function is mildly decreased. Estimated EF is 45-50%. No regional wall motion abnormalities-- hypokinesis is global. There is a ring in the mitral position. Trace residual mitral regurgitation. The transmitral gradient is 2mmHg. There is AGUSTIN when the LV is underfilled but does not appear to be hemodynamically significant. No change in the aortic valve. No pericardial effusion. No aortic dissection. There remains a small pleural effusion on the left.        ASSESSMENT AND RECOMMENDATIONS         58 year old male with history of Stage IV NSCLC on lorlatinib presented for MVR complicated by RV failure. Now with continued hypoxia post extubation.     Impression  Acute Hypoxic Respiratory Failure - remain hypoxic despite 100-% FiO2 on oygenator an 10L NC. Hypoxia is likely multifactorial. CXR demonstrates significant bilateral atelectasis as well as evidence of fluid overload. Additionally, there was PFO noted on SAURABH intraoperatively with right to left shunt in the setting of RV failure.  Stage IV NSCLC     Recommendations:  -Would add albuterol q6h and Mucomyst q12h  -Diuresis as per primary team  -Continue to encourage incentive spirometer  -Supplemental oxygen as necessary  -Management of right heart failure as per primary and cardiology teams       Berhane Ortiz MD  PGY-4  Pulmonary/Critical Care Fellow  3/31/2019

## 2019-03-31 NOTE — ASSESSMENT & PLAN NOTE
Patient with a history of metastatic stage IV lung cancer with metastatic disease to the brain status post chemo and radiation (brain only)    Continue his home Lorlatinib  Will need all CT imaging on discs prior to dc for patient to review with his oncologist

## 2019-04-01 ENCOUNTER — APPOINTMENT (INPATIENT)
Dept: RADIOLOGY | Facility: HOSPITAL | Age: 59
DRG: 003 | End: 2019-04-01
Attending: PHYSICIAN ASSISTANT
Payer: COMMERCIAL

## 2019-04-01 LAB
ALBUMIN SERPL-MCNC: 2.8 G/DL (ref 3.4–5)
ALP SERPL-CCNC: 48 IU/L (ref 35–126)
ALT SERPL-CCNC: 54 IU/L (ref 16–63)
ANION GAP SERPL CALC-SCNC: 8 MEQ/L (ref 3–15)
ANION GAP SERPL CALC-SCNC: 9 MEQ/L (ref 3–15)
APTT PPP: 37 SEC (ref 23–35)
APTT PPP: 43 SEC (ref 23–35)
APTT PPP: 43 SEC (ref 23–35)
AST SERPL-CCNC: 142 IU/L (ref 15–41)
ATRIAL RATE: 65
BASE EXCESS BLDA CALC-SCNC: -0.4 MEQ/L
BASE EXCESS BLDA CALC-SCNC: -0.7 MEQ/L
BASE EXCESS BLDA CALC-SCNC: -1.3 MEQ/L
BASE EXCESS BLDA CALC-SCNC: -1.8 MEQ/L
BASE EXCESS BLDA CALC-SCNC: -2 MEQ/L
BASE EXCESS BLDA CALC-SCNC: -2.2 MEQ/L
BASE EXCESS BLDA CALC-SCNC: -2.4 MEQ/L
BASE EXCESS BLDA CALC-SCNC: -2.6 MEQ/L
BASOPHILS # BLD: 0.03 K/UL (ref 0.01–0.1)
BASOPHILS NFR BLD: 0.3 %
BILIRUB SERPL-MCNC: 0.8 MG/DL (ref 0.3–1.2)
BUN SERPL-MCNC: 17 MG/DL (ref 8–20)
BUN SERPL-MCNC: 18 MG/DL (ref 8–20)
CA-I BLD-SCNC: 1.09 MMOL/L (ref 1.15–1.27)
CA-I BLD-SCNC: 1.11 MMOL/L (ref 1.15–1.27)
CA-I BLD-SCNC: 1.11 MMOL/L (ref 1.15–1.27)
CA-I BLD-SCNC: 1.14 MMOL/L (ref 1.15–1.27)
CA-I BLD-SCNC: 1.14 MMOL/L (ref 1.15–1.27)
CA-I BLD-SCNC: 1.15 MMOL/L (ref 1.15–1.27)
CA-I BLD-SCNC: 1.16 MMOL/L (ref 1.15–1.27)
CA-I BLD-SCNC: 1.16 MMOL/L (ref 1.15–1.27)
CALCIUM SERPL-MCNC: 8.1 MG/DL (ref 8.9–10.3)
CALCIUM SERPL-MCNC: 8.1 MG/DL (ref 8.9–10.3)
CHLORIDE SERPL-SCNC: 103 MEQ/L (ref 98–109)
CHLORIDE SERPL-SCNC: 105 MEQ/L (ref 98–109)
CO2 BLDA-SCNC: 23 MEQ/L (ref 22–32)
CO2 BLDA-SCNC: 24 MEQ/L (ref 22–32)
CO2 BLDA-SCNC: 25 MEQ/L (ref 22–32)
CO2 SERPL-SCNC: 19 MEQ/L (ref 22–32)
CO2 SERPL-SCNC: 21 MEQ/L (ref 22–32)
CREAT SERPL-MCNC: 1.2 MG/DL
CREAT SERPL-MCNC: 1.2 MG/DL
CROSSMATCH: NORMAL
DIFFERENTIAL METHOD BLD: ABNORMAL
EOSINOPHIL # BLD: 0.22 K/UL (ref 0.04–0.54)
EOSINOPHIL NFR BLD: 2.2 %
ERYTHROCYTE [DISTWIDTH] IN BLOOD BY AUTOMATED COUNT: 15.2 % (ref 11.6–14.4)
ERYTHROCYTE [DISTWIDTH] IN BLOOD BY AUTOMATED COUNT: 15.3 % (ref 11.6–14.4)
ERYTHROCYTE [DISTWIDTH] IN BLOOD BY AUTOMATED COUNT: 15.3 % (ref 11.6–14.4)
FIBRINOGEN PPP-MCNC: 645 MG/DL (ref 220–480)
FIBRINOGEN PPP-MCNC: 724 MG/DL (ref 220–480)
FIBRINOGEN PPP-MCNC: 746 MG/DL (ref 220–480)
GFR SERPL CREATININE-BSD FRML MDRD: >60 ML/MIN/1.73M*2
GFR SERPL CREATININE-BSD FRML MDRD: >60 ML/MIN/1.73M*2
GLUCOSE BLDA-MCNC: 125 MG/DL (ref 65–95)
GLUCOSE BLDA-MCNC: 127 MG/DL (ref 65–95)
GLUCOSE BLDA-MCNC: 129 MG/DL (ref 65–95)
GLUCOSE BLDA-MCNC: 131 MG/DL (ref 65–95)
GLUCOSE BLDA-MCNC: 133 MG/DL (ref 65–95)
GLUCOSE BLDA-MCNC: 140 MG/DL (ref 65–95)
GLUCOSE BLDA-MCNC: 141 MG/DL (ref 65–95)
GLUCOSE BLDA-MCNC: 142 MG/DL (ref 65–95)
GLUCOSE SERPL-MCNC: 126 MG/DL (ref 70–99)
GLUCOSE SERPL-MCNC: 135 MG/DL (ref 70–99)
HCO3 BLDA-SCNC: 22 MEQ/L (ref 21–28)
HCO3 BLDA-SCNC: 23 MEQ/L (ref 21–28)
HCO3 BLDA-SCNC: 24 MEQ/L (ref 21–28)
HCT VFR BLDA CALC: 18 % (ref 36–48)
HCT VFR BLDA CALC: 28 % (ref 36–48)
HCT VFR BLDA CALC: 30 % (ref 36–48)
HCT VFR BLDA CALC: 31 % (ref 36–48)
HCT VFR BLDA CALC: 32 % (ref 36–48)
HCT VFR BLDA CALC: 37 % (ref 36–48)
HCT VFR BLDCO AUTO: 29 %
HCT VFR BLDCO AUTO: 29.2 %
HCT VFR BLDCO AUTO: 29.6 %
HGB BLD-MCNC: 9.5 G/DL
HGB BLD-MCNC: 9.8 G/DL
HGB BLD-MCNC: 9.9 G/DL
HGB FREE PLAS-MCNC: <30 MG/DL
HGB FREE PLAS-MCNC: <30 MG/DL
IMM GRANULOCYTES # BLD AUTO: 0.14 K/UL (ref 0–0.08)
IMM GRANULOCYTES NFR BLD AUTO: 1.4 %
INR PPP: 1.2 INR
ISBT CODE: 5100
LACTATE BLDA-SCNC: 0.7 MMOL/L (ref 0.4–1.6)
LACTATE BLDA-SCNC: 0.8 MMOL/L (ref 0.4–1.6)
LACTATE BLDA-SCNC: 0.9 MMOL/L (ref 0.4–1.6)
LACTATE BLDA-SCNC: 0.9 MMOL/L (ref 0.4–1.6)
LACTATE BLDA-SCNC: 1 MMOL/L (ref 0.4–1.6)
LACTATE BLDA-SCNC: 1 MMOL/L (ref 0.4–1.6)
LDH SERPL L TO P-CCNC: 584 IU/L (ref 98–192)
LYMPHOCYTES # BLD: 1.1 K/UL (ref 1.2–3.5)
LYMPHOCYTES NFR BLD: 11 %
MAGNESIUM SERPL-MCNC: 2.3 MG/DL (ref 1.8–2.5)
MCH RBC QN AUTO: 29 PG (ref 28–33.2)
MCH RBC QN AUTO: 29.4 PG (ref 28–33.2)
MCH RBC QN AUTO: 30 PG (ref 28–33.2)
MCHC RBC AUTO-ENTMCNC: 32.5 G/DL (ref 32.2–36.5)
MCHC RBC AUTO-ENTMCNC: 33.1 G/DL (ref 32.2–36.5)
MCHC RBC AUTO-ENTMCNC: 34.1 G/DL (ref 32.2–36.5)
MCV RBC AUTO: 87.9 FL (ref 83–98)
MCV RBC AUTO: 88.9 FL (ref 83–98)
MCV RBC AUTO: 89 FL (ref 83–98)
MONOCYTES # BLD: 1.1 K/UL (ref 0.3–1)
MONOCYTES NFR BLD: 11 %
NEUTROPHILS # BLD: 7.38 K/UL (ref 1.7–7)
NEUTS SEG NFR BLD: 74.1 %
NRBC BLD-RTO: 0.2 %
OVALOCYTES BLD QL SMEAR: ABNORMAL
P AXIS: 65
PCO2 BLDA: 32 MM HG (ref 35–48)
PCO2 BLDA: 34 MM HG (ref 35–48)
PCO2 BLDA: 36 MM HG (ref 35–48)
PCO2 BLDA: 38 MM HG (ref 35–48)
PCO2 BLDA: 40 MM HG (ref 35–48)
PCO2 BLDA: 40 MM HG (ref 35–48)
PDW BLD AUTO: 10.7 FL (ref 9.4–12.4)
PDW BLD AUTO: 10.7 FL (ref 9.4–12.4)
PDW BLD AUTO: 11 FL (ref 9.4–12.4)
PH BLDA: 7.38 PH (ref 7.35–7.45)
PH BLDA: 7.38 PH (ref 7.35–7.45)
PH BLDA: 7.39 PH (ref 7.35–7.45)
PH BLDA: 7.4 PH (ref 7.35–7.45)
PH BLDA: 7.41 PH (ref 7.35–7.45)
PH BLDA: 7.42 PH (ref 7.35–7.45)
PH BLDA: 7.44 PH (ref 7.35–7.45)
PH BLDA: 7.45 PH (ref 7.35–7.45)
PHOSPHATE SERPL-MCNC: 3.6 MG/DL (ref 2.4–4.7)
PLAT MORPH BLD: NORMAL
PLATELET # BLD AUTO: 88 K/UL
PLATELET # BLD AUTO: 90 K/UL
PLATELET # BLD AUTO: 92 K/UL
PLATELET # BLD EST: ABNORMAL 10*3/UL
PO2 BLDA: 116 MM HG (ref 83–100)
PO2 BLDA: 143 MM HG (ref 83–100)
PO2 BLDA: 58 MM HG (ref 83–100)
PO2 BLDA: 60 MM HG (ref 83–100)
PO2 BLDA: 62 MM HG (ref 83–100)
PO2 BLDA: 64 MM HG (ref 83–100)
PO2 BLDA: 64 MM HG (ref 83–100)
PO2 BLDA: 81 MM HG (ref 83–100)
POCT PATIENT TEMPERATURE: 98.6 °F (ref 97–99)
POCT TEST (BLD GAS): ABNORMAL
POLYCHROMASIA BLD QL SMEAR: ABNORMAL
POTASSIUM BLDA-SCNC: 4.1 MEQ/L (ref 3.4–4.5)
POTASSIUM BLDA-SCNC: 4.3 MEQ/L (ref 3.4–4.5)
POTASSIUM BLDA-SCNC: 4.4 MEQ/L (ref 3.4–4.5)
POTASSIUM BLDA-SCNC: 4.6 MEQ/L (ref 3.4–4.5)
POTASSIUM BLDA-SCNC: 4.7 MEQ/L (ref 3.4–4.5)
POTASSIUM SERPL-SCNC: 4.3 MEQ/L (ref 3.6–5.1)
POTASSIUM SERPL-SCNC: 4.4 MEQ/L (ref 3.6–5.1)
POTASSIUM SERPL-SCNC: 4.5 MEQ/L (ref 3.6–5.1)
PR INTERVAL: 224
PRODUCT CODE: NORMAL
PRODUCT STATUS: NORMAL
PROT SERPL-MCNC: 4.8 G/DL (ref 6–8.2)
PROTHROMBIN TIME: 14.2 SEC (ref 12.2–14.5)
QRS DURATION: 102
QT INTERVAL: 414
QTC CALCULATION(BAZETT): 430
R AXIS: 85
RBC # BLD AUTO: 3.28 M/UL (ref 4.5–5.8)
RBC # BLD AUTO: 3.3 M/UL (ref 4.5–5.8)
RBC # BLD AUTO: 3.33 M/UL (ref 4.5–5.8)
SAO2 % BLDA: 90 % (ref 93–98)
SAO2 % BLDA: 91 % (ref 93–98)
SAO2 % BLDA: 91 % (ref 93–98)
SAO2 % BLDA: 92 % (ref 93–98)
SAO2 % BLDA: 93 % (ref 93–98)
SAO2 % BLDA: 96 % (ref 93–98)
SAO2 % BLDA: 99 % (ref 93–98)
SAO2 % BLDA: 99 % (ref 93–98)
SAO2 % BLDV: 60.3 % (ref 30–60)
SAO2 % BLDV: 66 % (ref 30–60)
SAO2 % BLDV: 71.7 % (ref 30–60)
SODIUM BLDA-SCNC: 132 MEQ/L (ref 136–145)
SODIUM BLDA-SCNC: 133 MEQ/L (ref 136–145)
SODIUM BLDA-SCNC: 134 MEQ/L (ref 136–145)
SODIUM SERPL-SCNC: 131 MEQ/L (ref 136–144)
SODIUM SERPL-SCNC: 134 MEQ/L (ref 136–144)
SPECIMEN EXP DATE BLD: NORMAL
T WAVE AXIS: 75
UNIT ABO: NORMAL
UNIT ID: NORMAL
UNIT RH: POSITIVE
VENTRICULAR RATE: 65
WBC # BLD AUTO: 10.31 K/UL
WBC # BLD AUTO: 10.34 K/UL
WBC # BLD AUTO: 9.97 K/UL

## 2019-04-01 PROCEDURE — 84132 ASSAY OF SERUM POTASSIUM: CPT | Performed by: THORACIC SURGERY (CARDIOTHORACIC VASCULAR SURGERY)

## 2019-04-01 PROCEDURE — 36430 TRANSFUSION BLD/BLD COMPNT: CPT

## 2019-04-01 PROCEDURE — 93005 ELECTROCARDIOGRAM TRACING: CPT | Performed by: THORACIC SURGERY (CARDIOTHORACIC VASCULAR SURGERY)

## 2019-04-01 PROCEDURE — 63700000 HC SELF-ADMINISTRABLE DRUG: Performed by: PHYSICIAN ASSISTANT

## 2019-04-01 PROCEDURE — 99232 SBSQ HOSP IP/OBS MODERATE 35: CPT | Mod: 25 | Performed by: INTERNAL MEDICINE

## 2019-04-01 PROCEDURE — 25000000 HC PHARMACY GENERAL: Performed by: PHYSICIAN ASSISTANT

## 2019-04-01 PROCEDURE — 84100 ASSAY OF PHOSPHORUS: CPT | Performed by: THORACIC SURGERY (CARDIOTHORACIC VASCULAR SURGERY)

## 2019-04-01 PROCEDURE — 83735 ASSAY OF MAGNESIUM: CPT | Performed by: PHYSICIAN ASSISTANT

## 2019-04-01 PROCEDURE — 99292 CRITICAL CARE ADDL 30 MIN: CPT | Performed by: ANESTHESIOLOGY

## 2019-04-01 PROCEDURE — 83735 ASSAY OF MAGNESIUM: CPT | Performed by: THORACIC SURGERY (CARDIOTHORACIC VASCULAR SURGERY)

## 2019-04-01 PROCEDURE — P9016 RBC LEUKOCYTES REDUCED: HCPCS

## 2019-04-01 PROCEDURE — 71045 X-RAY EXAM CHEST 1 VIEW: CPT

## 2019-04-01 PROCEDURE — 25800000 HC PHARMACY IV SOLUTIONS: Performed by: NURSE PRACTITIONER

## 2019-04-01 PROCEDURE — 83051 HEMOGLOBIN PLASMA: CPT | Performed by: CLINICAL NURSE SPECIALIST

## 2019-04-01 PROCEDURE — 63600000 HC DRUGS/DETAIL CODE: Performed by: PHYSICIAN ASSISTANT

## 2019-04-01 PROCEDURE — 97161 PT EVAL LOW COMPLEX 20 MIN: CPT | Mod: GP

## 2019-04-01 PROCEDURE — 48000013 HC VENTRICULAR ASSIST, ECMO HOURLY

## 2019-04-01 PROCEDURE — 83615 LACTATE (LD) (LDH) ENZYME: CPT | Performed by: THORACIC SURGERY (CARDIOTHORACIC VASCULAR SURGERY)

## 2019-04-01 PROCEDURE — 82310 ASSAY OF CALCIUM: CPT | Performed by: PHYSICIAN ASSISTANT

## 2019-04-01 PROCEDURE — 82810 BLOOD GASES O2 SAT ONLY: CPT | Performed by: CLINICAL NURSE SPECIALIST

## 2019-04-01 PROCEDURE — 33948 ECMO/ECLS DAILY MGMT-VENOUS: CPT | Performed by: INTERNAL MEDICINE

## 2019-04-01 PROCEDURE — 93010 ELECTROCARDIOGRAM REPORT: CPT | Performed by: INTERNAL MEDICINE

## 2019-04-01 PROCEDURE — 84132 ASSAY OF SERUM POTASSIUM: CPT | Performed by: PHYSICIAN ASSISTANT

## 2019-04-01 PROCEDURE — 63600000 HC DRUGS/DETAIL CODE: Performed by: NURSE PRACTITIONER

## 2019-04-01 PROCEDURE — 80053 COMPREHEN METABOLIC PANEL: CPT | Performed by: CLINICAL NURSE SPECIALIST

## 2019-04-01 PROCEDURE — 85730 THROMBOPLASTIN TIME PARTIAL: CPT | Performed by: NURSE PRACTITIONER

## 2019-04-01 PROCEDURE — 94640 AIRWAY INHALATION TREATMENT: CPT

## 2019-04-01 PROCEDURE — 85384 FIBRINOGEN ACTIVITY: CPT | Performed by: CLINICAL NURSE SPECIALIST

## 2019-04-01 PROCEDURE — 97530 THERAPEUTIC ACTIVITIES: CPT | Mod: GP

## 2019-04-01 PROCEDURE — 85610 PROTHROMBIN TIME: CPT | Performed by: THORACIC SURGERY (CARDIOTHORACIC VASCULAR SURGERY)

## 2019-04-01 PROCEDURE — 63700000 HC SELF-ADMINISTRABLE DRUG: Performed by: NURSE PRACTITIONER

## 2019-04-01 PROCEDURE — 99291 CRITICAL CARE FIRST HOUR: CPT | Mod: 25 | Performed by: ANESTHESIOLOGY

## 2019-04-01 PROCEDURE — 20000000 HC ROOM AND CARE ICU

## 2019-04-01 PROCEDURE — 85027 COMPLETE CBC AUTOMATED: CPT | Performed by: CLINICAL NURSE SPECIALIST

## 2019-04-01 PROCEDURE — 36415 COLL VENOUS BLD VENIPUNCTURE: CPT | Performed by: CLINICAL NURSE SPECIALIST

## 2019-04-01 PROCEDURE — 63600000 HC DRUGS/DETAIL CODE: Performed by: CLINICAL NURSE SPECIALIST

## 2019-04-01 RX ORDER — SODIUM CHLORIDE 9 MG/ML
5 INJECTION, SOLUTION INTRAVENOUS AS NEEDED
Status: ACTIVE | OUTPATIENT
Start: 2019-04-01 | End: 2019-04-02

## 2019-04-01 RX ORDER — FUROSEMIDE 10 MG/ML
40 INJECTION INTRAMUSCULAR; INTRAVENOUS
Status: DISCONTINUED | OUTPATIENT
Start: 2019-04-01 | End: 2019-04-02

## 2019-04-01 RX ORDER — AMLODIPINE BESYLATE 10 MG/1
10 TABLET ORAL DAILY
Status: DISCONTINUED | OUTPATIENT
Start: 2019-04-01 | End: 2019-04-02

## 2019-04-01 RX ORDER — INSULIN ASPART 100 [IU]/ML
8 INJECTION, SOLUTION INTRAVENOUS; SUBCUTANEOUS ONCE
Status: DISCONTINUED | OUTPATIENT
Start: 2019-04-01 | End: 2019-04-01

## 2019-04-01 RX ORDER — INSULIN ASPART 100 [IU]/ML
4 INJECTION, SOLUTION INTRAVENOUS; SUBCUTANEOUS ONCE
Status: COMPLETED | OUTPATIENT
Start: 2019-04-01 | End: 2019-04-01

## 2019-04-01 RX ADMIN — GUAIFENESIN 1200 MG: 600 TABLET, EXTENDED RELEASE ORAL at 09:12

## 2019-04-01 RX ADMIN — STANDARDIZED SENNA CONCENTRATE 1 TABLET: 8.6 TABLET ORAL at 19:39

## 2019-04-01 RX ADMIN — AMLODIPINE BESYLATE 10 MG: 10 TABLET ORAL at 14:00

## 2019-04-01 RX ADMIN — POTASSIUM CHLORIDE 20 MEQ: 750 TABLET, FILM COATED, EXTENDED RELEASE ORAL at 19:39

## 2019-04-01 RX ADMIN — FAMOTIDINE 20 MG: 20 TABLET, FILM COATED ORAL at 19:38

## 2019-04-01 RX ADMIN — ACETAMINOPHEN 975 MG: 325 TABLET ORAL at 11:46

## 2019-04-01 RX ADMIN — POTASSIUM CHLORIDE 20 MEQ: 200 INJECTION, SOLUTION INTRAVENOUS at 05:45

## 2019-04-01 RX ADMIN — DOCUSATE SODIUM 100 MG: 100 CAPSULE, LIQUID FILLED ORAL at 19:38

## 2019-04-01 RX ADMIN — FAMOTIDINE 20 MG: 20 TABLET, FILM COATED ORAL at 09:12

## 2019-04-01 RX ADMIN — FOLIC ACID 1 MG: 1 TABLET ORAL at 09:12

## 2019-04-01 RX ADMIN — IPRATROPIUM BROMIDE AND ALBUTEROL SULFATE 3 ML: 2.5; .5 SOLUTION RESPIRATORY (INHALATION) at 07:27

## 2019-04-01 RX ADMIN — STANDARDIZED SENNA CONCENTRATE 1 TABLET: 8.6 TABLET ORAL at 09:12

## 2019-04-01 RX ADMIN — GUAIFENESIN 1200 MG: 600 TABLET, EXTENDED RELEASE ORAL at 19:38

## 2019-04-01 RX ADMIN — ASPIRIN 81 MG: 81 TABLET, COATED ORAL at 09:12

## 2019-04-01 RX ADMIN — DEXTROSE 0.5 MG/MIN: 5 SOLUTION INTRAVENOUS at 01:20

## 2019-04-01 RX ADMIN — CHLORHEXIDINE GLUCONATE 15 ML: 1.2 RINSE ORAL at 21:31

## 2019-04-01 RX ADMIN — KETOROLAC TROMETHAMINE 30 MG: 30 INJECTION, SOLUTION INTRAMUSCULAR; INTRAVENOUS at 23:42

## 2019-04-01 RX ADMIN — INSULIN ASPART 4 UNITS: 100 INJECTION, SOLUTION INTRAVENOUS; SUBCUTANEOUS at 08:34

## 2019-04-01 RX ADMIN — FUROSEMIDE 40 MG: 40 INJECTION INTRAMUSCULAR; INTRAVENOUS at 09:13

## 2019-04-01 RX ADMIN — INSULIN ASPART 3 UNITS: 100 INJECTION, SOLUTION INTRAVENOUS; SUBCUTANEOUS at 17:25

## 2019-04-01 RX ADMIN — ACETYLCYSTEINE 600 MG: 200 INHALANT RESPIRATORY (INHALATION) at 07:27

## 2019-04-01 RX ADMIN — KETOROLAC TROMETHAMINE 30 MG: 30 INJECTION, SOLUTION INTRAMUSCULAR; INTRAVENOUS at 04:40

## 2019-04-01 RX ADMIN — COLCHICINE 0.6 MG: 0.6 TABLET, FILM COATED ORAL at 09:12

## 2019-04-01 RX ADMIN — AMIODARONE HYDROCHLORIDE 400 MG: 200 TABLET ORAL at 08:21

## 2019-04-01 RX ADMIN — IPRATROPIUM BROMIDE AND ALBUTEROL SULFATE 3 ML: 2.5; .5 SOLUTION RESPIRATORY (INHALATION) at 21:10

## 2019-04-01 RX ADMIN — POTASSIUM CHLORIDE 20 MEQ: 750 TABLET, FILM COATED, EXTENDED RELEASE ORAL at 09:13

## 2019-04-01 RX ADMIN — ROSUVASTATIN CALCIUM 10 MG: 10 TABLET, FILM COATED ORAL at 17:28

## 2019-04-01 RX ADMIN — IPRATROPIUM BROMIDE AND ALBUTEROL SULFATE 3 ML: 2.5; .5 SOLUTION RESPIRATORY (INHALATION) at 15:03

## 2019-04-01 RX ADMIN — INSULIN ASPART 3 UNITS: 100 INJECTION, SOLUTION INTRAVENOUS; SUBCUTANEOUS at 11:47

## 2019-04-01 RX ADMIN — MULTIPLE VITAMINS W/ MINERALS TAB 1 TABLET: TAB at 09:12

## 2019-04-01 RX ADMIN — ACETYLCYSTEINE 800 MG: 200 INHALANT RESPIRATORY (INHALATION) at 21:06

## 2019-04-01 RX ADMIN — INSULIN HUMAN 8 UNITS: 100 INJECTION, SUSPENSION SUBCUTANEOUS at 08:33

## 2019-04-01 RX ADMIN — DOCUSATE SODIUM 100 MG: 100 CAPSULE, LIQUID FILLED ORAL at 09:12

## 2019-04-01 RX ADMIN — FUROSEMIDE 40 MG: 40 INJECTION INTRAMUSCULAR; INTRAVENOUS at 21:33

## 2019-04-01 RX ADMIN — OXYCODONE HYDROCHLORIDE 10 MG: 5 TABLET ORAL at 10:19

## 2019-04-01 RX ADMIN — AMIODARONE HYDROCHLORIDE 400 MG: 200 TABLET ORAL at 19:38

## 2019-04-01 RX ADMIN — ACETAMINOPHEN 975 MG: 325 TABLET ORAL at 17:27

## 2019-04-01 RX ADMIN — ACETAMINOPHEN 975 MG: 325 TABLET ORAL at 04:59

## 2019-04-01 RX ADMIN — HEPARIN SODIUM AND DEXTROSE 1200 UNITS/HR: 10000; 5 INJECTION INTRAVENOUS at 23:49

## 2019-04-01 RX ADMIN — Medication 100 MG: at 09:12

## 2019-04-01 RX ADMIN — ACETAMINOPHEN 975 MG: 325 TABLET ORAL at 23:41

## 2019-04-01 ASSESSMENT — COGNITIVE AND FUNCTIONAL STATUS - GENERAL
STANDING UP FROM CHAIR USING ARMS: 2 - A LOT
WALKING IN HOSPITAL ROOM: 2 - A LOT
CLIMB 3 TO 5 STEPS WITH RAILING: 1 - TOTAL
MOVING TO AND FROM BED TO CHAIR: 2 - A LOT

## 2019-04-01 NOTE — PROGRESS NOTES
Pulmonary Progress Note       SUBJECTIVE   Patient seen and examined at bedside this morning. No acute events overnight. Oxygenator on Protek Duo was capped, patient with acceptable SpO2 on 12 L NC. He was sitting in chair, feeling somewhat better.      OBJECTIVE        Vital Signs:   BP (!) 150/70 Comment: arterial line   Pulse 64   Temp 36.2 °C (97.1 °F) (Temporal)   Resp (!) 22   Ht 1.829 m (6')   Wt 113 kg (249 lb 12.5 oz)   SpO2 94%   BMI 33.88 kg/m²     I/O's:    Intake/Output Summary (Last 24 hours) at 04/01/19 1546  Last data filed at 04/01/19 1500   Gross per 24 hour   Intake          3387.58 ml   Output             4565 ml   Net         -1177.42 ml       Medications:    Reviewed. Pertinent medications as below.      acetaminophen 975 mg oral q6h RIGOBERTO   acetylcysteine 3 mL nebulization BID (6a, 6p)   amiodarone 400 mg oral BID   amLODIPine 10 mg oral Daily   aspirin 81 mg oral Daily   chlorhexidine 15 mL Mouth/Throat 2 times per day   colchicine 0.6 mg oral Daily   docusate sodium 100 mg oral BID   famotidine 20 mg intravenous BID   Or      famotidine 20 mg oral BID   folic acid 1 mg oral Daily   furosemide 40 mg intravenous BID (9a, 10p)   guaiFENesin 1,200 mg oral BID   insulin aspart U-100 3 Units subcutaneous TID with meals   ipratropium-albuterol 3 mL nebulization q6h RIGOBERTO   lorlatinib 75 mg oral Daily   [START ON 4/2/2019] magnesium oxide 400 mg oral BID   multivitamin 1 tablet oral Daily   potassium chloride 20 mEq oral BID   rosuvastatin 10 mg oral Daily (6p)   senna 1 tablet oral BID   thiamine 100 mg oral Daily           PHYSICAL EXAMINATION        General: The patient is in no acute distress.  Resting comfortably in bed.  HEENT: Mucous membranes are moist.  Sclera are anicteric.  Neck: Supple.  No cervical lymphadenopathy  Cardiovascular: S1 and S2 are present.  There are no murmurs.  Lungs: mild rhonchi  Abdomen: Soft, nontender and nondistended  Extremities: Cool and dry without  edema  Neuro: Awake and alert.  Spontaneously moving all extremities       Diagnostic Data      Labs:    I have personally reviewed all pertinent patient laboratory results. Labs of note discussed below:    ABG Results       04/01/19 04/01/19 04/01/19                    1349 1104 0834         pH 7.38 7.44 7.42         pCO2 38 32 (L) 34 (L)         pO2 64 (L) 81 (L) 58 (L)         HCO3 23 22 22         Base Excess -2.4 -1.8 -2.0                     CMP Results       04/01/19 04/01/19 03/31/19                    0832 0343 2140          (L) 134 (L) -         K 4.4 4.3 4.4         Cl 103 105 -         CO2 19 (L) 21 (L) -         Glucose 135 (H) 126 (H) -         BUN 18 17 -         Creatinine 1.2 1.2 -         Calcium 8.1 (L) 8.1 (L) -         Anion Gap 9 8 -         AST - 142 (H) -         ALT - 54 -         Albumin - 2.8 (L) -         EGFR &gt;60.0 &gt;60.0 -                     CBC Results       04/01/19 04/01/19 03/31/19                    0830 0343 2140         WBC 10.31 9.97 11.10 (H)         RBC 3.33 (L) 3.28 (L) 2.96 (L)         HGB 9.8 (L) 9.5 (L) 8.6 (L)         HCT 29.6 (L) 29.2 (L) 26.6 (L)         MCV 88.9 89.0 89.9         MCH 29.4 29.0 29.1         MCHC 33.1 32.5 32.3         PLT 88 (L) 92 (L) 94 (L)         Comment for PLT at 0830 on 04/01/19:  CONSISTENT WITH PREVIOUS RESULTS    Comment for PLT at 0343 on 04/01/19:  CONSISTENT WITH PREVIOUS RESULTS    Comment for PLT at 2140 on 03/31/19:  CONSISTENT WITH PREVIOUS RESULTS        Imaging:    I have reviewed all pertinent imaging which is discussed below.    CXR 4/1/2019: some improvement in aeration of lower lobes      ASSESSMENT AND PLAN      58 year old male with history of Stage IV NSCLC on lorlatinib presented for MVR complicated by RV failure. Now with continued hypoxia post extubation.      Impression  Acute Hypoxic Respiratory Failure - remains hypoxic, although significantly improved since yesterday. Hypoxia is likely multifactorial. CXR  demonstrated significant bilateral atelectasis as well as evidence of fluid overload. Additionally, there was PFO noted on SAURABH intraoperatively with right to left shunt in the setting of RV failure.  Stage IV NSCLC      Recommendations:  -Would continue albuterol q6h and Mucomyst q12h  -Diuresis as per primary team  -Continue to encourage incentive spirometer  -Supplemental oxygen as necessary  -Management of right heart failure as per primary and cardiology teams       Berhane Ortiz MD  PGY-4  Pulmonary/Critical Care Fellow  4/1/2019

## 2019-04-01 NOTE — PROGRESS NOTES
Patient: Wesley Fields  Location: [address] [address]23  MRN: [id number]  Today's date: 4/1/2019  Post session, pt sitting in chair, w/call bell accessible. Nsg notified.      Hospital Course  Wesley STALLINGS is a 58 y.o. male admitted on 3/29/2019 with Mitral valve insufficiency, unspecified etiology [I34.0]  Mitral regurgitation [I34.0]. Principal problem is Mitral valve insufficiency.    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     Admit w/mitral insufficiency s/p MVR w/ dora cords, +thoractomy> hosp course complicated by hypoxemia, +PFO w/cardiogenic shock/R ventricular dysfunction> now on VV ECMO thru subclavian          Therapy Pain/Vitals     Row Name 04/01/19 1414          Pain/Comfort/Sleep    Presence of Pain denies     Pain Rating (0-10): Rest 0     Pain Rating (0-10): Activity 0        Vital Signs    Pulse 64     SpO2 95 %   maintained 95% on 15 L w/activity      BP (!)  150/70   arterial line            Prior Living Environment      Most Recent Value   Lives With  spouse [wife's name: Mulugeta]   Living Arrangements  house   Living Environment Comment  2SH w/4 steps to enter w/o railing, powder room on first floor, both tub and shower stall shower on 2nd floor, bedroom on 2nd floor    Equipment Currently Used at Home  none          Prior Level of Function      Most Recent Value   Ambulation  independent   Transferring  independent   Toileting  independent   Bathing  independent   Dressing  independent   Eating  independent   Equipment Currently Used at Home  none   Prior Functional Level Comment  INd w/o AD for community distances and stairs.  Wife does  most of IADl's.  Pt was working.                 PT Evaluation - 04/01/19 1401        Session Details    Document Type initial evaluation    Mode of Treatment physical therapy;individual therapy       Time Calculation    Start Time 1401    Stop Time 1425    Time Calculation (min) 24 min       General Information    Patient Profile Reviewed? yes    Existing Precautions/Restrictions cardiac;fall   ECMO lines (ECMO perfusionist present w/session)        Orientation Log    Comment AXOX3        Sensory    Sensory General Assessment --   LT BLE intact       Range of Motion (ROM)    Comment, General Range of Motion ROM BLE WFL        Manual Muscle Testing (MMT)    Comment 4/5 BLE        Sit to Stand Transfer    [place], Sit to Stand Transfer 2 person assist;minimum assist (75% patient effort)       Stand to Sit Transfer    [place], Stand to Sit Transfer 2 person assist;minimum assist (75% patient effort)       Gait Training    [place], Gait 2 person assist;minimum assist (75% or more patient effort)    Assistive Device walker, front-wheeled    Distance in Feet 10 feet    Gait Pattern Utilized step-through    Comment Mild SOB w/activity, ECMO flows increased to 5L and pt placed on 15 HF for amb, stable Vs, Pt fatigued easily. +Chair follow.        AM-PAC (TM) - Mobility (Current Function)    Turning from your back to your side while in a flat bed without using bedrails? 2 - A Lot    Moving from lying on your back to sitting on the side of a flat bed without using bedrails? 2 - A Lot    Moving to and from a bed to a chair? 2 - A Lot    Standing up from a chair using your arms? 2 - A Lot    To walk in a hospital room? 2 - A Lot    Climbing 3-5 steps with a railing? 1 - Total    AM-PAC (TM) Mobility Score 11       PT Clinical Impression    Patient's Goals For Discharge return to all previous roles/activities    Plan For Care Reviewed: Physical Therapy patient feedback incorporated in PT plan for care    Rehab  Potential/Prognosis good, to achieve stated therapy goals    PT Frequency of Treatment 5-7 times per week    Anticipated Equipment Needs at Discharge none    Daily Outcome Statement Of note, pt has stage IV lung cancer; PMR consult indicates possible mets in sternum, cranium and questionable lumbar.  Will cont to confirm.  PMR ok'd basic mobility. Pt on VV EMCO thru subclavian.  Flows increased to 5L for amb trial and pt placed on 15 L HF for amb.  Used +chair follow.  Pt fatigued with 10 feet.  Expect steady progess.   Will try next session to maximize focus of verbal cues to one person.  Continue to make sure to have enough room on L side for ECMO.  Pt for possible echo tomorrow.  Will cont to assess for dispo recs.  May be able to go home w/ assistance pending progress.                         Education provided this session. See the Patient Education summary report for full details.    PT Care Plan Goals      Most Recent Value   Stair Goal, PT   PT STG: Stairs  supervision required   PT STG: Number of Stairs  4      PT Care Plan Goals      Most Recent Value   Bed Mobility Goal   Date Goal Established: Bed Mobility  04/01/19   Time to Achieve Goal: Bed Mobility  5 days   Goal Activity: Bed Mobility  all bed mobility activities   Level of [place] Goal: Bed Mobility  independent   Gait Goal   Date Goal Established: Gait Training  04/01/19   Time to Achieve Goal: Gait Training  5 days   Level of [place]  supervision required   Assistive Device: Gait Training  walker, rolling   Distance Goal: Gait Training (feet)  250 feet   Goal: Gait Training  amb w/least AD    Transfer Goal   Date Goal Established: Transfer Training  04/01/19   Time to Achieve Goal: Transfer Training  5 days   Goal Activity: Transfer Training  bed to chair/chair to bed, sit to stand/stand to sit   Level of [place] Goal: Transfer Training  independent

## 2019-04-01 NOTE — PLAN OF CARE
Problem: Patient Care Overview  Goal: Plan of Care Review  Outcome: Ongoing (interventions implemented as appropriate)   04/01/19 1555   Coping/Psychosocial   Plan Of Care Reviewed With patient;spouse   Plan of Care Review   Progress improving       Problem: Cardiac Surgery (Adult)  Goal: Signs and Symptoms of Listed Potential Problems Will be Absent, Minimized or Managed (Cardiac Surgery)  Outcome: Ongoing (interventions implemented as appropriate)   04/01/19 1555   Cardiac Surgery   Problems Assessed (Cardiac Surgery) all   Problems Present (Cardiac Surgery) bowel motility decreased;cardiac complications;functional deficit;hemodynamic instability;pain;situational response;respiratory compromise     Goal: Anesthesia/Sedation Recovery  Outcome: Adequate for Discharge   04/01/19 0112   Goal/Outcome Evaluation   Anesthesia/Sedation Recovery criteria met for discharge

## 2019-04-01 NOTE — PLAN OF CARE
Problem: Patient Care Overview  Goal: Plan of Care Review  Outcome: Ongoing (interventions implemented as appropriate)   04/01/19 8700   Coping/Psychosocial   Plan Of Care Reviewed With patient   Plan of Care Review   Progress progress towards functional goals is fair   Outcome Summary Pt required 2 person min A for func mob as well 3 nurses and ECMO perfusionist for func mob.      Goal: Individualization & Mutuality  Outcome: Ongoing (interventions implemented as appropriate)      Problem: Cardiac Surgery (Adult)  Goal: Signs and Symptoms of Listed Potential Problems Will be Absent, Minimized or Managed (Cardiac Surgery)  Outcome: Ongoing (interventions implemented as appropriate)      Problem: Acute Therapy Services Goal & Intervention Plan  Goal: Bed Mobility Goal  Outcome: Ongoing (interventions implemented as appropriate)    Goal: Gait Training Goal  Outcome: Ongoing (interventions implemented as appropriate)    Goal: Stairs Goal  Outcome: Ongoing (interventions implemented as appropriate)    Goal: Transfer Training Goal  Outcome: Ongoing (interventions implemented as appropriate)

## 2019-04-01 NOTE — PROGRESS NOTES
Cardiothoracic Intensivist Progress Note       CARDIOTHORACIC   Post-Operative Day # 3     Subjective     History of Present Illness: Wesley Fields is a 58 y.o. cisgender male with history of decreased exercise capacity prior to OR s/p MVR, chronic HTN, and hyperlipidemia that are being treated.     Review of Systems:                                     Constitutional: no acute distress                                                  Eyes: denies difficulty with vision  Ears, nose, mouth, throat, and face: denies oral discomfort                                        Respiratory: denies shortness of breath                                  Cardiovascular: mild chest discomfort at incision site                                  Gastrointestinal: denies abdominal pain                                    Genitourinary: denies difficulty voiding                                      Hematologic: denies bleeding issues                                Musculoskeletal: denies muscle pain                                      Neurological: generalized weakness                                   Integumentary: denies rash    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    Social History:   Social History     Social History   • Marital status:      Spouse name: N/A   • Number of children: 4   • Years  of education: N/A     Social History Main Topics   • Smoking status: Never Smoker   • Smokeless tobacco: Never Used   • Alcohol use Yes      Comment: 2-3 beers/day   • Drug use: No   • Sexual activity: Not Asked     Other Topics Concern   • None     Social History Narrative    Patient lives with his wife in a 2 story home-bathroom on 2nd floor       Family History:   Family History   Problem Relation Age of Onset   • COPD Mother    • Multiple myeloma Mother    • Lung cancer Father    • Cancer Sister         thyroid       Objective     Vital signs in last 24 hours:  Heart Rate:  [54-74] 70  Resp:  [17-24] 22  FiO2 (%) (Set):  [80 %] 80 %      Intake/Output Summary (Last 24 hours) at 04/01/19 0958  Last data filed at 04/01/19 0900   Gross per 24 hour   Intake          4125.98 ml   Output             2700 ml   Net          1425.98 ml     Intake/Output this shift:  I/O this shift:  In: 184 [I.V.:184]  Out: 175 [Urine:175]    Labs  Lab Results   Component Value Date    WBC 10.31 04/01/2019    HGB 9.8 (L) 04/01/2019    HCT 29.6 (L) 04/01/2019    PLT 88 (L) 04/01/2019    ALT 54 04/01/2019     (H) 04/01/2019     (L) 04/01/2019    K 4.4 04/01/2019     04/01/2019    CREATININE 1.2 04/01/2019    BUN 18 04/01/2019    CO2 19 (L) 04/01/2019    MG 2.3 04/01/2019    PHOS 3.6 04/01/2019    INR 1.2 04/01/2019    HGBA1C 5.6 03/29/2019    PT 14.2 04/01/2019    PTT 43 (H) 04/01/2019       Full Code    Head/Ear/Nose/Throat:     NCAT.                               Eyes:     PERRL.                     Respiratory:     diminished at the bases b/l.               Cardiovascular:     Paced, mechanical heart sounds.              Gastrointestinal:     + bowel sounds.                 Genitourinary:     gambino in place.                    Extremities:     trace edema b/l lower extremities.            Musculoskeletal:      No injury or deformity.                   Neurological:     Follows commands.       Behavior/Emotional:      Cooperative.                           Lymph:      No adenopathy noted.                               Skin:      Clean, dry and intact.     Examination of the patient performed at the bedside. Rounded with ICU team and discussed management/plan with surgical staff. Medications, radiological studies and labs reviewed and discussed with attending of record, Dr. Georgi Pepe.    Cardiothoracic Assessment and Plan:    Neurologic: A&Ox3 and pain controlled. History of metastatic lung cancer to brain which has since been treated.     Cardiovascular: Severe MR s/p MV repair now in cardiogenic shock with right heart failure, also with chronic diastolic CHF with a preserved EF and hypertension. Still on dobutamine for cardiac support/myocardial stunning. RVAD placed intra-op due to worsening RV function. Unclear reason for acute decompensation as no CAD apparent and coronaries appeared to be de-aired thoroughly coming off bypass. Could also be from pinching of coronary arteries at some point during manipulation of valve. Had some ST elevations but have since resolved. RVAD oxygenator has been capped however due to labile PO2 will not be explanted until tomorrow in case it is still needed. Will resuscitate, monitor MVO2/CO, wean vasoactive medication and continue to optimize cardiac medications. Appreciate Cardiology input. Continue statin for dyslipidemia.     Respiratory: I personally reviewed the most recent CXR which portrayed b/l pulmonary congestion more notable in the right lung fields (pulmonary contusion) and small lung volumes with interval clearing compared to previous exam. Will encourage IS, mobilization and wean O2 PRN.      Genitourinary: Continue gambino for strict I/O. Slightly elevated creatinine which is at baseline and with adequate GFR. Making adequate urine output. Will avoid nephrotoxic medications.     Endocrine: Follow FSBG.     Hematologic: No evidence active bleeding. On a heparin gtt. Acute  (expected) blood loss anemia from surgery. Follow plasma free Hbg to monitor for hemolysis. Secondary thrombocytopenia.     Infectious Disease: No indication for antibiotics at this time. Metastatic CA appears to be regressing by latest CT scan.     Fluids, Electrolytes: Electrolytes replaced per protocol.     Gastrointestinal: PO as tolerated. GI ppx. Slight elevation in LFTs due to acute disease process and will monitor for now.     Skin: OOB, PT.      Tubes, lines and drains: Will remove superfluous invasive monitors PRN.    Disposition: Critically ill.  Right heart failure with RVAD support s/p major invasive cardiac surgery. Continue ICU care.     I personally spent 100 minutes of critical care time with this patient not including procedure time.          Antonio Lafleur, DO  4/1/2019

## 2019-04-01 NOTE — PATIENT CARE CONFERENCE
Care Progression Rounds Note  Date: 4/1/2019  Time: 9:40 AM     Patient Name: Wesley Fields     Medical Record Number: [id number]   YOB: 1960  Sex: Male      Room/Bed: 2023    Admitting Diagnosis: Mitral valve insufficiency, unspecified etiology [I34.0]  Mitral regurgitation [I34.0]   Admit Date/Time: 3/29/2019  5:40 AM    Primary Diagnosis: Mitral valve insufficiency  Principal Problem: Mitral valve insufficiency    GMLOS: 9.1  Anticipated Discharge Date: 4/8/2019    AM-PAC  Mobility Score:      Discharge Planning:       Barriers to Discharge:  Barriers to Discharge: Medical issues not resolved    Participants:  advanced practice provider, , nursing, occupational therapy, social work/services, respiratory therapy

## 2019-04-01 NOTE — PROGRESS NOTES
Pt visited by  john Romeo. Offered spiritual and emotional support, as well as education about spiritual care services. Pt given contact information if additional needs arise. - Charted by Rev. Guanako Coates [phone]

## 2019-04-01 NOTE — CONSULTS
Physical Medicine and Rehabilitation Consult Note    Subjective     Wesley Fields is a 58 y.o. male who was admitted for Mitral valve insufficiency, unspecified etiology [I34.0]  Mitral regurgitation [I34.0]. We were asked to see for rehabilitation needs. 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  Patient was complaining of shortness of breath with stairs but states he was able to ambulate short distances and dressed without difficulty.  He was evaluated by Dr. Pepe and found to have severe mitral insufficiency.  Patient was cleared for surgery and went to the OR on 3/29/2019.  Patient underwent repair of the mitral valve with 3 stents of posterior dora-cords, and Greco annuloplasty band, thoracotomy.  In the OR had right ventricular failure and hypoxemia underwent cannulation and initiation of VV ECMO/RVAD.  Patient required multiple units of packed red blood cells platelets and cryo.  He was extubated on 3/30/2019 ECMO still in place.  Oxygenation at 50%.  Dobutamine drip is slowly being weaned.  Patient will require Coumadin once ECMO removed.  Patient with chest tubes in place AV wires and Ceron.  Patient is now out of bed to chair.  We have been asked to evaluate and begin mobilization once the ECMO was discontinued.  Patient is slowly improving.  He is very nervous for mobilization.  His ejection fraction prior to the surgery was 45%.  I did discuss with his wife history of bony metastases and he had a posterior skull and sternal met and there was also question as to a back metastasis but she is not sure of this and will clarify.  He did have metastatic disease to the brain and underwent XRT and developed jaw osteonecrosis.  The patient had some low back pain but it did  not inhibit his mobility.  He was functioning very independent at home.    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  He was completely independent without an assistive device.  Bedroom and bathroom second floor.  Powder room on the first floor.  There are about 3-4 steps to enter.  Social History Narrative    Patient lives with his wife in a 2 story home-bathroom on 2nd floor       Lives with:  Wife    Prior Function Level:  Independent      Family History:   Family History   Problem Relation Age of Onset   • COPD Mother    • Multiple myeloma Mother    • Lung cancer Father    • Cancer Sister         thyroid       History also provided by: Patient      Allergies: Patient has no known allergies.      acetaminophen 975 mg oral q6h RIGOBERTO   acetylcysteine 3 mL nebulization BID (6a, 6p)   amiodarone 400 mg oral BID   aspirin 81 mg oral Daily   chlorhexidine 15 mL Mouth/Throat 2 times per day   colchicine 0.6 mg oral Daily   docusate sodium 100 mg oral BID   famotidine 20 mg intravenous BID   Or      famotidine 20 mg oral BID   folic acid 1 mg oral Daily   furosemide 40 mg intravenous BID (9a, 10p)   guaiFENesin 1,200 mg oral BID   insulin aspart U-100 3 Units subcutaneous TID with meals   ipratropium-albuterol 3 mL nebulization q6h RIGOBERTO   lorlatinib 75 mg oral  Daily   [START ON 4/2/2019] magnesium oxide 400 mg oral BID   multivitamin 1 tablet oral Daily   potassium chloride 20 mEq oral BID   rosuvastatin 10 mg oral Daily (6p)   senna 1 tablet oral BID   thiamine 100 mg oral Daily       Review of Systems   All 11 systems were reviewed and negative except as noted in the HPI.      Objective   Labs  I reviewed the below labs.  Patient slightly anemic 9.8/29.6.  CO2 19 creatinine 1.2  Lab Results   Component Value Date    WBC 10.31 04/01/2019    HGB 9.8 (L) 04/01/2019    HCT 29.6 (L) 04/01/2019    PLT 88 (L) 04/01/2019    ALT 54 04/01/2019     (H) 04/01/2019     (L) 04/01/2019    K 4.4 04/01/2019     04/01/2019    CREATININE 1.2 04/01/2019    BUN 18 04/01/2019    CO2 19 (L) 04/01/2019    INR 1.2 04/01/2019    HGBA1C 5.6 03/29/2019     Imaging  I reviewed the below chest x-ray  COMMENT: The current portable study of the chest was compared to that dated  3/31/2019.     ECMO catheters remain unchanged in position.     The diffuse interstitial disease throughout the right lung associated with  pleural thickening/effusion, is unchanged.  There are right chest tubes in place  with no evidence of pneumothorax.     Left basilar pleural-parenchymal disease remains stable.     There is no gross pulmonary vascular congestion or edema.    Physical Exam  /85   Pulse 70   Temp 36.2 °C (97.1 °F) (Temporal)   Resp (!) 22   Ht 1.829 m (6')   Wt 113 kg (249 lb 12.5 oz)   SpO2 (!) 87%   BMI 33.88 kg/m²   Examination    HEENT: No jaundice.  Oral mucose moist.  No carotid bruits.  Atraumatic.  No nuchal rigidity.  Lungs: Breathing unlabored.  No rales or rhonchi.  Clear  Chest wall: ECMO right side.  Chest tube in place.  Heart: Regular.  Abdomen: Bowel sounds: Soft nontender  Skin: No rash.  Extremities: No acutely inflamed joints.  ROM functional.  Increased edema throughout  Neurological:  Alert and oriented with intact speech and cognition.  Cranial nerves were  symmetrical and intact.  Sensation: subjectively preserved to touch.  Motor testing shows no focal weakness.  Patient did well with nursing out of bed to chair.      Assessment   58 y.o. male being consulted for rehabilitation needs.  Plan of care was discussed with patient and Wife and CT surgery team.       1.  Patient with mitral insufficiency status post mitral valve repair with dora-cords and Edward annuloplasty ring through thoracotomy.  Unfortunate hypoxemia and PFO with cardiogenic shock right ventricular dysfunction.  Patient underwent ECMO placement.  On dobutamine drip.  Patient is now extubated still with ECMO in place.  Getting mobilization bed to chair.  I am hoping that once this patient's ECMO was discontinued he will take off and be able to return home once medically stable.    2.  Left stage IV non-small cell lung CA with bone and brain metastases: Patient is status post XRT to the brain.  It appears he had bone metastases to the sternum and posterior skull possibly the back wife is to clarify.  Patient is cleared for basic mobility ADLs and ambulation with therapy.  Monitor pulse ox with increased activity.    3.  Hyperlipidemia: Continue statin    4.  Hypertension: Continue meds per CT surgery.  Will monitor vitals as we increase activity.  No new Assessment & Plan notes have been filed under this hospital service since the last note was generated.  Service: Physical Medicine and Rehabilitation            Roselyn Cantrell MD  4/1/2019  9:59 AM

## 2019-04-01 NOTE — HOSPITAL COURSE
Wesely STALLINGS is a 58 y.o. male admitted on 3/29/2019 with Mitral valve insufficiency, unspecified etiology [I34.0]  Mitral regurgitation [I34.0]. Principal problem is Mitral valve insufficiency.    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     Admit w/mitral insufficiency s/p MVR w/ dora cords, +thoractomy> hosp course complicated by hypoxemia, +PFO w/cardiogenic shock/R ventricular dysfunction> now on VV ECMO thru subclavian    4/3/19 Cardiology assessed pt: plan to remain on ECMO through this week.     4/5 improving R heart function per SAURABH on 4/4 4/8 Pt off ECMO

## 2019-04-02 ENCOUNTER — TELEPHONE (OUTPATIENT)
Dept: SCHEDULING | Facility: CLINIC | Age: 59
End: 2019-04-02

## 2019-04-02 ENCOUNTER — APPOINTMENT (INPATIENT)
Dept: RADIOLOGY | Facility: HOSPITAL | Age: 59
DRG: 003 | End: 2019-04-02
Attending: CLINICAL NURSE SPECIALIST
Payer: COMMERCIAL

## 2019-04-02 ENCOUNTER — APPOINTMENT (INPATIENT)
Dept: CARDIOLOGY | Facility: HOSPITAL | Age: 59
DRG: 003 | End: 2019-04-02
Attending: CLINICAL NURSE SPECIALIST
Payer: COMMERCIAL

## 2019-04-02 ENCOUNTER — APPOINTMENT (INPATIENT)
Dept: RADIOLOGY | Facility: HOSPITAL | Age: 59
DRG: 003 | End: 2019-04-02
Attending: NURSE PRACTITIONER
Payer: COMMERCIAL

## 2019-04-02 PROBLEM — I27.20 PULMONARY HYPERTENSION (CMS/HCC): Status: ACTIVE | Noted: 2019-04-02

## 2019-04-02 PROBLEM — I51.9 RIGHT VENTRICULAR DYSFUNCTION: Status: ACTIVE | Noted: 2019-04-02

## 2019-04-02 LAB
ABO + RH BLD: NORMAL
ALBUMIN SERPL-MCNC: 2.7 G/DL (ref 3.4–5)
ALP SERPL-CCNC: 60 IU/L (ref 35–126)
ALT SERPL-CCNC: 50 IU/L (ref 16–63)
ANION GAP SERPL CALC-SCNC: 12 MEQ/L (ref 3–15)
APTT PPP: 38 SEC (ref 23–35)
APTT PPP: 41 SEC (ref 23–35)
APTT PPP: 46 SEC (ref 23–35)
APTT PPP: 53 SEC (ref 23–35)
AST SERPL-CCNC: 112 IU/L (ref 15–41)
BASE EXCESS BLDA CALC-SCNC: -0.7 MEQ/L
BASE EXCESS BLDA CALC-SCNC: -0.7 MEQ/L
BASE EXCESS BLDA CALC-SCNC: -2 MEQ/L
BASE EXCESS BLDA CALC-SCNC: -2.1 MEQ/L
BILIRUB SERPL-MCNC: 0.9 MG/DL (ref 0.3–1.2)
BLD GP AB SCN SERPL QL: NEGATIVE
BSA FOR ECHO PROCEDURE: 2.4 M2
BUN SERPL-MCNC: 22 MG/DL (ref 8–20)
CA-I BLD-SCNC: 1.09 MMOL/L (ref 1.15–1.27)
CA-I BLD-SCNC: 1.1 MMOL/L (ref 1.15–1.27)
CA-I BLD-SCNC: 1.1 MMOL/L (ref 1.15–1.27)
CA-I BLD-SCNC: 1.13 MMOL/L (ref 1.15–1.27)
CALCIUM SERPL-MCNC: 8.1 MG/DL (ref 8.9–10.3)
CHLORIDE SERPL-SCNC: 107 MEQ/L (ref 98–109)
CO2 BLDA-SCNC: 23 MEQ/L (ref 22–32)
CO2 BLDA-SCNC: 23 MEQ/L (ref 22–32)
CO2 BLDA-SCNC: 24 MEQ/L (ref 22–32)
CO2 BLDA-SCNC: 24 MEQ/L (ref 22–32)
CO2 SERPL-SCNC: 19 MEQ/L (ref 22–32)
CREAT SERPL-MCNC: 1.2 MG/DL
CROSSMATCH: NORMAL
D AG BLD QL: POSITIVE
ERYTHROCYTE [DISTWIDTH] IN BLOOD BY AUTOMATED COUNT: 15.1 % (ref 11.6–14.4)
ERYTHROCYTE [DISTWIDTH] IN BLOOD BY AUTOMATED COUNT: 15.2 % (ref 11.6–14.4)
ERYTHROCYTE [DISTWIDTH] IN BLOOD BY AUTOMATED COUNT: 15.5 % (ref 11.6–14.4)
ERYTHROCYTE [DISTWIDTH] IN BLOOD BY AUTOMATED COUNT: 15.5 % (ref 11.6–14.4)
ERYTHROCYTE [DISTWIDTH] IN BLOOD BY AUTOMATED COUNT: 15.6 % (ref 11.6–14.4)
FIBRINOGEN PPP-MCNC: 734 MG/DL (ref 220–480)
FIBRINOGEN PPP-MCNC: 768 MG/DL (ref 220–480)
GFR SERPL CREATININE-BSD FRML MDRD: >60 ML/MIN/1.73M*2
GLUCOSE BLDA-MCNC: 106 MG/DL (ref 65–95)
GLUCOSE BLDA-MCNC: 120 MG/DL (ref 65–95)
GLUCOSE BLDA-MCNC: 123 MG/DL (ref 65–95)
GLUCOSE BLDA-MCNC: 158 MG/DL (ref 65–95)
GLUCOSE SERPL-MCNC: 122 MG/DL (ref 70–99)
HCO3 BLDA-SCNC: 22 MEQ/L (ref 21–28)
HCO3 BLDA-SCNC: 22 MEQ/L (ref 21–28)
HCO3 BLDA-SCNC: 23 MEQ/L (ref 21–28)
HCO3 BLDA-SCNC: 23 MEQ/L (ref 21–28)
HCT VFR BLDA CALC: 30 % (ref 36–48)
HCT VFR BLDA CALC: 31 % (ref 36–48)
HCT VFR BLDA CALC: 31 % (ref 36–48)
HCT VFR BLDA CALC: 33 % (ref 36–48)
HCT VFR BLDCO AUTO: 29.5 %
HCT VFR BLDCO AUTO: 29.6 %
HCT VFR BLDCO AUTO: 30 %
HCT VFR BLDCO AUTO: 30.1 %
HCT VFR BLDCO AUTO: 30.3 %
HGB BLD-MCNC: 10 G/DL
HGB BLD-MCNC: 9.5 G/DL
HGB BLD-MCNC: 9.6 G/DL
HGB BLD-MCNC: 9.8 G/DL
HGB BLD-MCNC: 9.8 G/DL
HGB FREE PLAS-MCNC: <30 MG/DL
HGB FREE PLAS-MCNC: <30 MG/DL
ISBT CODE: 5100
ISBT CODE: 6200
LABORATORY COMMENT REPORT: NORMAL
LACTATE BLDA-SCNC: 0.6 MMOL/L (ref 0.4–1.6)
LACTATE BLDA-SCNC: 0.7 MMOL/L (ref 0.4–1.6)
LACTATE BLDA-SCNC: 0.8 MMOL/L (ref 0.4–1.6)
LACTATE BLDA-SCNC: 1.1 MMOL/L (ref 0.4–1.6)
MAGNESIUM SERPL-MCNC: 2.3 MG/DL (ref 1.8–2.5)
MAGNESIUM SERPL-MCNC: 2.3 MG/DL (ref 1.8–2.5)
MAGNESIUM SERPL-MCNC: 2.4 MG/DL (ref 1.8–2.5)
MCH RBC QN AUTO: 28.9 PG (ref 28–33.2)
MCH RBC QN AUTO: 29 PG (ref 28–33.2)
MCH RBC QN AUTO: 29.3 PG (ref 28–33.2)
MCHC RBC AUTO-ENTMCNC: 32.1 G/DL (ref 32.2–36.5)
MCHC RBC AUTO-ENTMCNC: 32.3 G/DL (ref 32.2–36.5)
MCHC RBC AUTO-ENTMCNC: 32.5 G/DL (ref 32.2–36.5)
MCHC RBC AUTO-ENTMCNC: 32.6 G/DL (ref 32.2–36.5)
MCHC RBC AUTO-ENTMCNC: 33.3 G/DL (ref 32.2–36.5)
MCV RBC AUTO: 88 FL (ref 83–98)
MCV RBC AUTO: 88.8 FL (ref 83–98)
MCV RBC AUTO: 89.1 FL (ref 83–98)
MCV RBC AUTO: 89.4 FL (ref 83–98)
MCV RBC AUTO: 90 FL (ref 83–98)
PCO2 BLDA: 34 MM HG (ref 35–48)
PCO2 BLDA: 36 MM HG (ref 35–48)
PDW BLD AUTO: 10.5 FL (ref 9.4–12.4)
PDW BLD AUTO: 10.7 FL (ref 9.4–12.4)
PDW BLD AUTO: 10.7 FL (ref 9.4–12.4)
PDW BLD AUTO: 10.9 FL (ref 9.4–12.4)
PDW BLD AUTO: 11.1 FL (ref 9.4–12.4)
PH BLDA: 7.4 PH (ref 7.35–7.45)
PH BLDA: 7.42 PH (ref 7.35–7.45)
PH BLDA: 7.44 PH (ref 7.35–7.45)
PH BLDA: 7.44 PH (ref 7.35–7.45)
PLATELET # BLD AUTO: 102 K/UL
PLATELET # BLD AUTO: 88 K/UL
PLATELET # BLD AUTO: 89 K/UL
PLATELET # BLD AUTO: 92 K/UL
PLATELET # BLD AUTO: 99 K/UL
PO2 BLDA: 103 MM HG (ref 83–100)
PO2 BLDA: 133 MM HG (ref 83–100)
PO2 BLDA: 147 MM HG (ref 83–100)
PO2 BLDA: 71 MM HG (ref 83–100)
POCT PATIENT TEMPERATURE: 98.6 °F (ref 97–99)
POCT TEST (BLD GAS): ABNORMAL
POTASSIUM BLDA-SCNC: 3.9 MEQ/L (ref 3.4–4.5)
POTASSIUM BLDA-SCNC: 4.2 MEQ/L (ref 3.4–4.5)
POTASSIUM BLDA-SCNC: 4.3 MEQ/L (ref 3.4–4.5)
POTASSIUM BLDA-SCNC: 4.5 MEQ/L (ref 3.4–4.5)
POTASSIUM SERPL-SCNC: 4.2 MEQ/L (ref 3.6–5.1)
POTASSIUM SERPL-SCNC: 4.3 MEQ/L (ref 3.6–5.1)
POTASSIUM SERPL-SCNC: 4.4 MEQ/L (ref 3.6–5.1)
PRODUCT CODE: NORMAL
PRODUCT STATUS: NORMAL
PROT SERPL-MCNC: 4.7 G/DL (ref 6–8.2)
RBC # BLD AUTO: 3.29 M/UL (ref 4.5–5.8)
RBC # BLD AUTO: 3.31 M/UL (ref 4.5–5.8)
RBC # BLD AUTO: 3.39 M/UL (ref 4.5–5.8)
RBC # BLD AUTO: 3.39 M/UL (ref 4.5–5.8)
RBC # BLD AUTO: 3.41 M/UL (ref 4.5–5.8)
SAO2 % BLDA: 94 % (ref 93–98)
SAO2 % BLDA: 98 % (ref 93–98)
SAO2 % BLDA: 99 % (ref 93–98)
SAO2 % BLDA: 99 % (ref 93–98)
SAO2 % BLDV: 53.9 % (ref 30–60)
SAO2 % BLDV: 59.4 % (ref 30–60)
SAO2 % BLDV: 61.1 % (ref 30–60)
SAO2 % BLDV: 64 % (ref 30–60)
SAO2 % BLDV: 71.7 % (ref 30–60)
SODIUM BLDA-SCNC: 133 MEQ/L (ref 136–145)
SODIUM BLDA-SCNC: 135 MEQ/L (ref 136–145)
SODIUM SERPL-SCNC: 138 MEQ/L (ref 136–144)
SPECIMEN EXP DATE BLD: NORMAL
UNIT ABO: NORMAL
UNIT ID: NORMAL
UNIT RH: POSITIVE
WBC # BLD AUTO: 10.3 K/UL
WBC # BLD AUTO: 10.3 K/UL
WBC # BLD AUTO: 10.94 K/UL
WBC # BLD AUTO: 9.64 K/UL
WBC # BLD AUTO: 9.71 K/UL

## 2019-04-02 PROCEDURE — 82810 BLOOD GASES O2 SAT ONLY: CPT | Performed by: CLINICAL NURSE SPECIALIST

## 2019-04-02 PROCEDURE — 02HV33Z INSERTION OF INFUSION DEVICE INTO SUPERIOR VENA CAVA, PERCUTANEOUS APPROACH: ICD-10-PCS | Performed by: RADIOLOGY

## 2019-04-02 PROCEDURE — 99232 SBSQ HOSP IP/OBS MODERATE 35: CPT | Mod: 25 | Performed by: INTERNAL MEDICINE

## 2019-04-02 PROCEDURE — 83051 HEMOGLOBIN PLASMA: CPT | Performed by: CLINICAL NURSE SPECIALIST

## 2019-04-02 PROCEDURE — 84132 ASSAY OF SERUM POTASSIUM: CPT | Performed by: THORACIC SURGERY (CARDIOTHORACIC VASCULAR SURGERY)

## 2019-04-02 PROCEDURE — 83735 ASSAY OF MAGNESIUM: CPT | Performed by: PHYSICIAN ASSISTANT

## 2019-04-02 PROCEDURE — 48000013 HC VENTRICULAR ASSIST, ECMO HOURLY

## 2019-04-02 PROCEDURE — 63700000 HC SELF-ADMINISTRABLE DRUG: Performed by: PHYSICIAN ASSISTANT

## 2019-04-02 PROCEDURE — 99292 CRITICAL CARE ADDL 30 MIN: CPT | Performed by: ANESTHESIOLOGY

## 2019-04-02 PROCEDURE — 97116 GAIT TRAINING THERAPY: CPT | Mod: GP

## 2019-04-02 PROCEDURE — 86901 BLOOD TYPING SEROLOGIC RH(D): CPT

## 2019-04-02 PROCEDURE — 63600000 HC DRUGS/DETAIL CODE: Performed by: NURSE PRACTITIONER

## 2019-04-02 PROCEDURE — 80053 COMPREHEN METABOLIC PANEL: CPT | Performed by: CLINICAL NURSE SPECIALIST

## 2019-04-02 PROCEDURE — 99223 1ST HOSP IP/OBS HIGH 75: CPT | Performed by: INTERNAL MEDICINE

## 2019-04-02 PROCEDURE — 63700000 HC SELF-ADMINISTRABLE DRUG: Performed by: NURSE PRACTITIONER

## 2019-04-02 PROCEDURE — 27900059 OXYGEN DAILY

## 2019-04-02 PROCEDURE — 25000000 HC PHARMACY GENERAL: Performed by: PHYSICIAN ASSISTANT

## 2019-04-02 PROCEDURE — 36569 INSJ PICC 5 YR+ W/O IMAGING: CPT

## 2019-04-02 PROCEDURE — 63600000 HC DRUGS/DETAIL CODE: Performed by: PHYSICIAN ASSISTANT

## 2019-04-02 PROCEDURE — 71045 X-RAY EXAM CHEST 1 VIEW: CPT

## 2019-04-02 PROCEDURE — 93308 TTE F-UP OR LMTD: CPT | Mod: 26 | Performed by: INTERNAL MEDICINE

## 2019-04-02 PROCEDURE — 86920 COMPATIBILITY TEST SPIN: CPT

## 2019-04-02 PROCEDURE — 94640 AIRWAY INHALATION TREATMENT: CPT

## 2019-04-02 PROCEDURE — 85730 THROMBOPLASTIN TIME PARTIAL: CPT | Performed by: NURSE PRACTITIONER

## 2019-04-02 PROCEDURE — 99291 CRITICAL CARE FIRST HOUR: CPT | Mod: 25 | Performed by: ANESTHESIOLOGY

## 2019-04-02 PROCEDURE — 33948 ECMO/ECLS DAILY MGMT-VENOUS: CPT | Performed by: INTERNAL MEDICINE

## 2019-04-02 PROCEDURE — 85027 COMPLETE CBC AUTOMATED: CPT | Performed by: CLINICAL NURSE SPECIALIST

## 2019-04-02 PROCEDURE — 76937 US GUIDE VASCULAR ACCESS: CPT

## 2019-04-02 PROCEDURE — 83735 ASSAY OF MAGNESIUM: CPT | Performed by: THORACIC SURGERY (CARDIOTHORACIC VASCULAR SURGERY)

## 2019-04-02 PROCEDURE — 20000000 HC ROOM AND CARE ICU

## 2019-04-02 PROCEDURE — 83051 HEMOGLOBIN PLASMA: CPT | Performed by: THORACIC SURGERY (CARDIOTHORACIC VASCULAR SURGERY)

## 2019-04-02 PROCEDURE — C1751 CATH, INF, PER/CENT/MIDLINE: HCPCS

## 2019-04-02 PROCEDURE — 93321 DOPPLER ECHO F-UP/LMTD STD: CPT | Mod: 26 | Performed by: INTERNAL MEDICINE

## 2019-04-02 PROCEDURE — 36415 COLL VENOUS BLD VENIPUNCTURE: CPT | Performed by: CLINICAL NURSE SPECIALIST

## 2019-04-02 PROCEDURE — 25800000 HC PHARMACY IV SOLUTIONS: Performed by: PHYSICIAN ASSISTANT

## 2019-04-02 PROCEDURE — 85384 FIBRINOGEN ACTIVITY: CPT | Performed by: CLINICAL NURSE SPECIALIST

## 2019-04-02 PROCEDURE — 93325 DOPPLER ECHO COLOR FLOW MAPG: CPT | Mod: 26 | Performed by: INTERNAL MEDICINE

## 2019-04-02 PROCEDURE — 93308 TTE F-UP OR LMTD: CPT

## 2019-04-02 RX ORDER — SODIUM CHLORIDE 0.9 % (FLUSH) 0.9 %
10 SYRINGE (ML) INJECTION
Status: DISCONTINUED | OUTPATIENT
Start: 2019-04-02 | End: 2019-04-11 | Stop reason: HOSPADM

## 2019-04-02 RX ORDER — MILRINONE LACTATE 0.2 MG/ML
0.25 INJECTION, SOLUTION INTRAVENOUS CONTINUOUS
Status: DISCONTINUED | OUTPATIENT
Start: 2019-04-02 | End: 2019-04-02

## 2019-04-02 RX ORDER — LISINOPRIL 5 MG/1
5 TABLET ORAL DAILY
Status: DISCONTINUED | OUTPATIENT
Start: 2019-04-02 | End: 2019-04-02

## 2019-04-02 RX ORDER — SILDENAFIL CITRATE 20 MG/1
20 TABLET ORAL 3 TIMES DAILY
Status: DISCONTINUED | OUTPATIENT
Start: 2019-04-02 | End: 2019-04-10

## 2019-04-02 RX ORDER — SODIUM CHLORIDE 0.9 % (FLUSH) 0.9 %
10 SYRINGE (ML) INJECTION AS NEEDED
Status: DISCONTINUED | OUTPATIENT
Start: 2019-04-02 | End: 2019-04-11 | Stop reason: HOSPADM

## 2019-04-02 RX ORDER — FUROSEMIDE 10 MG/ML
40 INJECTION INTRAMUSCULAR; INTRAVENOUS
Status: DISCONTINUED | OUTPATIENT
Start: 2019-04-03 | End: 2019-04-05

## 2019-04-02 RX ADMIN — SILDENAFIL 20 MG: 20 TABLET ORAL at 19:42

## 2019-04-02 RX ADMIN — KETOROLAC TROMETHAMINE 30 MG: 30 INJECTION, SOLUTION INTRAMUSCULAR; INTRAVENOUS at 21:21

## 2019-04-02 RX ADMIN — GUAIFENESIN 1200 MG: 600 TABLET, EXTENDED RELEASE ORAL at 08:34

## 2019-04-02 RX ADMIN — ACETYLCYSTEINE 600 MG: 200 INHALANT RESPIRATORY (INHALATION) at 07:35

## 2019-04-02 RX ADMIN — Medication 10 ML: at 08:46

## 2019-04-02 RX ADMIN — OXYCODONE HYDROCHLORIDE 5 MG: 5 TABLET ORAL at 01:08

## 2019-04-02 RX ADMIN — ACETAMINOPHEN 975 MG: 325 TABLET ORAL at 05:27

## 2019-04-02 RX ADMIN — POTASSIUM CHLORIDE 20 MEQ: 200 INJECTION, SOLUTION INTRAVENOUS at 17:57

## 2019-04-02 RX ADMIN — KETOROLAC TROMETHAMINE 30 MG: 30 INJECTION, SOLUTION INTRAMUSCULAR; INTRAVENOUS at 05:28

## 2019-04-02 RX ADMIN — STANDARDIZED SENNA CONCENTRATE 1 TABLET: 8.6 TABLET ORAL at 08:33

## 2019-04-02 RX ADMIN — AMIODARONE HYDROCHLORIDE 400 MG: 200 TABLET ORAL at 08:33

## 2019-04-02 RX ADMIN — ROSUVASTATIN CALCIUM 10 MG: 10 TABLET, FILM COATED ORAL at 17:19

## 2019-04-02 RX ADMIN — AMIODARONE HYDROCHLORIDE 400 MG: 200 TABLET ORAL at 19:41

## 2019-04-02 RX ADMIN — ACETAMINOPHEN 975 MG: 325 TABLET ORAL at 17:18

## 2019-04-02 RX ADMIN — GUAIFENESIN 1200 MG: 600 TABLET, EXTENDED RELEASE ORAL at 19:42

## 2019-04-02 RX ADMIN — POTASSIUM CHLORIDE 20 MEQ: 750 TABLET, FILM COATED, EXTENDED RELEASE ORAL at 08:29

## 2019-04-02 RX ADMIN — ACETAMINOPHEN 975 MG: 325 TABLET ORAL at 23:16

## 2019-04-02 RX ADMIN — POTASSIUM CHLORIDE 20 MEQ: 200 INJECTION, SOLUTION INTRAVENOUS at 01:08

## 2019-04-02 RX ADMIN — CALCIUM CHLORIDE 1 G: 100 INJECTION, SOLUTION INTRAVENOUS at 05:43

## 2019-04-02 RX ADMIN — INSULIN ASPART 3 UNITS: 100 INJECTION, SOLUTION INTRAVENOUS; SUBCUTANEOUS at 17:16

## 2019-04-02 RX ADMIN — DOCUSATE SODIUM 100 MG: 100 CAPSULE, LIQUID FILLED ORAL at 08:33

## 2019-04-02 RX ADMIN — ACETYLCYSTEINE 800 MG: 200 INHALANT RESPIRATORY (INHALATION) at 20:15

## 2019-04-02 RX ADMIN — MILRINONE LACTATE IN DEXTROSE 0.25 MCG/KG/MIN: 200 INJECTION, SOLUTION INTRAVENOUS at 08:46

## 2019-04-02 RX ADMIN — Medication 100 MG: at 08:34

## 2019-04-02 RX ADMIN — FAMOTIDINE 20 MG: 20 TABLET, FILM COATED ORAL at 19:42

## 2019-04-02 RX ADMIN — FUROSEMIDE 40 MG: 40 INJECTION INTRAMUSCULAR; INTRAVENOUS at 08:34

## 2019-04-02 RX ADMIN — COLCHICINE 0.6 MG: 0.6 TABLET, FILM COATED ORAL at 08:34

## 2019-04-02 RX ADMIN — ASPIRIN 81 MG: 81 TABLET, COATED ORAL at 08:33

## 2019-04-02 RX ADMIN — FAMOTIDINE 20 MG: 20 TABLET, FILM COATED ORAL at 08:33

## 2019-04-02 RX ADMIN — MAGNESIUM GLUCONATE 500 MG ORAL TABLET 400 MG: 500 TABLET ORAL at 19:42

## 2019-04-02 RX ADMIN — HEPARIN SODIUM AND DEXTROSE 1400 UNITS/HR: 10000; 5 INJECTION INTRAVENOUS at 17:15

## 2019-04-02 RX ADMIN — IPRATROPIUM BROMIDE AND ALBUTEROL SULFATE 3 ML: 2.5; .5 SOLUTION RESPIRATORY (INHALATION) at 20:15

## 2019-04-02 RX ADMIN — CHLORHEXIDINE GLUCONATE 15 ML: 1.2 RINSE ORAL at 21:11

## 2019-04-02 RX ADMIN — Medication 10 ML: at 17:20

## 2019-04-02 RX ADMIN — IPRATROPIUM BROMIDE AND ALBUTEROL SULFATE 3 ML: 2.5; .5 SOLUTION RESPIRATORY (INHALATION) at 15:20

## 2019-04-02 RX ADMIN — MULTIPLE VITAMINS W/ MINERALS TAB 1 TABLET: TAB at 08:33

## 2019-04-02 RX ADMIN — MAGNESIUM GLUCONATE 500 MG ORAL TABLET 400 MG: 500 TABLET ORAL at 08:33

## 2019-04-02 RX ADMIN — POTASSIUM CHLORIDE 20 MEQ: 750 TABLET, FILM COATED, EXTENDED RELEASE ORAL at 19:42

## 2019-04-02 RX ADMIN — FOLIC ACID 1 MG: 1 TABLET ORAL at 08:34

## 2019-04-02 RX ADMIN — INSULIN ASPART 3 UNITS: 100 INJECTION, SOLUTION INTRAVENOUS; SUBCUTANEOUS at 08:35

## 2019-04-02 RX ADMIN — INSULIN ASPART 3 UNITS: 100 INJECTION, SOLUTION INTRAVENOUS; SUBCUTANEOUS at 11:52

## 2019-04-02 RX ADMIN — IPRATROPIUM BROMIDE AND ALBUTEROL SULFATE 3 ML: 2.5; .5 SOLUTION RESPIRATORY (INHALATION) at 07:34

## 2019-04-02 RX ADMIN — MILRINONE LACTATE IN DEXTROSE 0.25 MCG/KG/MIN: 200 INJECTION, SOLUTION INTRAVENOUS at 08:34

## 2019-04-02 RX ADMIN — ACETAMINOPHEN 975 MG: 325 TABLET ORAL at 11:51

## 2019-04-02 ASSESSMENT — COGNITIVE AND FUNCTIONAL STATUS - GENERAL
WALKING IN HOSPITAL ROOM: 3 - A LITTLE
STANDING UP FROM CHAIR USING ARMS: 3 - A LITTLE
MOVING TO AND FROM BED TO CHAIR: 3 - A LITTLE
CLIMB 3 TO 5 STEPS WITH RAILING: 2 - A LOT

## 2019-04-02 NOTE — PLAN OF CARE
Problem: Patient Care Overview  Goal: Plan of Care Review  Outcome: Ongoing (interventions implemented as appropriate)   04/02/19 1775   Coping/Psychosocial   Plan Of Care Reviewed With patient   Plan of Care Review   Progress progress toward functional goals as expected   Outcome Summary requires min A for transfers and ambulation and mod Ax2 for bed mobility as well as 2-3 staff for safety with line and ECMO management       Problem: Cardiac Surgery (Adult)  Goal: Signs and Symptoms of Listed Potential Problems Will be Absent, Minimized or Managed (Cardiac Surgery)  Outcome: Ongoing (interventions implemented as appropriate)

## 2019-04-02 NOTE — PROGRESS NOTES
Pulmonary Progress Note       SUBJECTIVE   Patient seen and examined at bedside this morning. No acute events overnight. Remains on 12 L NC. Patient is feeling a little better. Oxygenator on Protek Duo was capped, patient with acceptable SpO2 on 12 L NC.      OBJECTIVE        Vital Signs:   BP (!) 150/70 Comment: arterial line   Pulse (!) 54   Temp 36.2 °C (97.1 °F) (Temporal)   Resp 19   Ht 1.829 m (6')   Wt 113 kg (249 lb 5.4 oz)   SpO2 100%   BMI 33.82 kg/m²     I/O's:    Intake/Output Summary (Last 24 hours) at 04/02/19 0932  Last data filed at 04/02/19 0829   Gross per 24 hour   Intake           2176.1 ml   Output             6440 ml   Net          -4263.9 ml       Medications:    Reviewed. Pertinent medications as below.      acetaminophen 975 mg oral q6h RIGOBERTO   acetylcysteine 3 mL nebulization BID (6a, 6p)   amiodarone 400 mg oral BID   aspirin 81 mg oral Daily   chlorhexidine 15 mL Mouth/Throat 2 times per day   colchicine 0.6 mg oral Daily   docusate sodium 100 mg oral BID   famotidine 20 mg intravenous BID   Or      famotidine 20 mg oral BID   folic acid 1 mg oral Daily   furosemide 40 mg intravenous BID (9a, 10p)   guaiFENesin 1,200 mg oral BID   insulin aspart U-100 3 Units subcutaneous TID with meals   ipratropium-albuterol 3 mL nebulization q6h RIGOBERTO   lorlatinib 75 mg oral Daily   magnesium oxide 400 mg oral BID   multivitamin 1 tablet oral Daily   potassium chloride 20 mEq oral BID   rosuvastatin 10 mg oral Daily (6p)   senna 1 tablet oral BID   sodium chloride 10 mL intravenous q8h INT   thiamine 100 mg oral Daily           PHYSICAL EXAMINATION        General: The patient is in no acute distress.  Resting comfortably in bed.  HEENT: Mucous membranes are moist.  Sclera are anicteric.  Neck: Supple.  No cervical lymphadenopathy  Cardiovascular: S1 and S2 are present.  There are no murmurs.  Lungs: mild rhonchi  Abdomen: Soft, nontender and nondistended  Extremities: Cool and dry without  edema  Neuro: Awake and alert.  Spontaneously moving all extremities       Diagnostic Data      Labs:    I have personally reviewed all pertinent patient laboratory results. Labs of note discussed below:         ABG Results       04/02/19 04/01/19 04/01/19                    0609 2350 1711         pH 7.42 7.45 7.41         pCO2 34 (L) 34 (L) 34 (L)         pO2 71 (L) 64 (L) 60 (L)         HCO3 22 24 22         Base Excess -2.0 -0.4 -2.6                     CMP Results       04/02/19 04/01/19 04/01/19                    0608 2336 1708          - -         K 4.4 4.2 4.5         Cl 107 - -         CO2 19 (L) - -         Glucose 122 (H) - -         BUN 22 (H) - -         Creatinine 1.2 - -         Calcium 8.1 (L) - -         Anion Gap 12 - -          (H) - -         ALT 50 - -         Albumin 2.7 (L) - -         EGFR &gt;60.0 - -         Comment for K at 1708 on 04/01/19:    Results obtained on plasma. Plasma Potassium values may be up to 0.4 mEQ/L less than serum values. The differences may be greater for patients with high platelet or white cell counts.        CBC Results       04/02/19 04/01/19 04/01/19                    0608 2336 1708         WBC 10.30 10.94 (H) 10.34         RBC 3.29 (L) 3.41 (L) 3.30 (L)         HGB 9.5 (L) 10.0 (L) 9.9 (L)         HCT 29.6 (L) 30.0 (L) 29.0 (L)         MCV 90.0 88.0 87.9         MCH 28.9 29.3 30.0         MCHC 32.1 (L) 33.3 34.1         PLT 92 (L) 102 (L) 90 (L)         Comment for WBC at 1708 on 04/01/19:  ALL RESULTS HAVE BEEN CHECKED    Comment for PLT at 1708 on 04/01/19:  SPECIMEN QUALITY CHECKED. RESULTS OBTAINED AFTER VORTEXING TO ELIMINATE PLT CLUMPS              Imaging:    I have reviewed all pertinent imaging which is discussed below.    CXR 4/2/2019:       ASSESSMENT AND PLAN      58 year old male with history of Stage IV NSCLC on lorlatinib presented for MVR complicated by RV failure. Now with continued hypoxia post extubation.      Impression  Acute  Hypoxic Respiratory Failure - hypoxia improving. Hypoxia is likely multifactorial. CXR demonstrated significant bilateral atelectasis as well as evidence of fluid overload. Additionally, there was PFO noted on SAURABH intraoperatively with right to left shunt in the setting of RV failure.  Stage IV NSCLC      Recommendations:  -Would continue albuterol q6h and Mucomyst q12h  -Diuresis as per primary team  -Continue to encourage incentive spirometer  -Supplemental oxygen as necessary  -Management of right heart failure as per primary and cardiology teams       Berhane Ortiz MD  PGY-4  Pulmonary/Critical Care Fellow  4/2/2019

## 2019-04-02 NOTE — PLAN OF CARE
Problem: Cardiac Surgery (Adult)  Goal: Signs and Symptoms of Listed Potential Problems Will be Absent, Minimized or Managed (Cardiac Surgery)  Outcome: Ongoing (interventions implemented as appropriate)   04/02/19 0302   Cardiac Surgery   Problems Assessed (Cardiac Surgery) functional deficit;pain   Problems Present (Cardiac Surgery) functional deficit;pain

## 2019-04-02 NOTE — TELEPHONE ENCOUNTER
Li called from [facility], she wanted to know if Dr Pepe will be signing orders for this patient.   She can be reached @[phone]

## 2019-04-02 NOTE — PROGRESS NOTES
ECMO Daily Assessment and Management Procedure    Type: ProtekDuo Right ventricular assist device    Indication: Cardiogenic Shock-Right ventricle    Drainage Cannula:Right Subclavian Vein 31Fr    Return Cannula:same as drainage(double lumen) cannula 31Fr    :capped    Pre-pressure 227 , , delta pressure 27  Circuit flow 4 L/min at 3527 RPM    Circuit fully inspected from drainage to return cannula. Fibrin visible in oxygenator - post membrane.   Thrombus visible in oxygenator - pre membrane. There are no tubing kinks. The age of the circuit lines is 3 days.            Cardiology Daily Progress Note    Subjective   Wesley Fields is a 58 y.o. male who was admitted for Mitral valve insufficiency, unspecified etiology [I34.0]  Mitral regurgitation [I34.0].    Interval History: Extubated Awake alert      Objective     Vital signs in last 24 hours:  Heart Rate:  [54-72] 60  Resp:  [17-22] 22  BP: (150)/(70) 150/70      Intake/Output Summary (Last 24 hours) at 04/01/19 2017  Last data filed at 04/01/19 2000   Gross per 24 hour   Intake          3318.74 ml   Output             4460 ml   Net         -1141.26 ml     Intake/Output this shift:  I/O this shift:  In: 73.7 [I.V.:73.7]  Out: 375 [Urine:375]    Labs  Lab Results   Component Value Date    WBC 10.34 04/01/2019    HGB 9.9 (L) 04/01/2019    HCT 29.0 (L) 04/01/2019    PLT 90 (L) 04/01/2019    ALT 54 04/01/2019     (H) 04/01/2019     (L) 04/01/2019    K 4.5 04/01/2019     04/01/2019    CREATININE 1.2 04/01/2019    BUN 18 04/01/2019    CO2 19 (L) 04/01/2019    INR 1.2 04/01/2019    HGBA1C 5.6 03/29/2019     Troponin I Results     No lab values to display.          Imaging  I have independently reviewed the pertinent imaging from the last 24 hrs.    ECG   sinus rhythm    Telemetry  sinus rhythm      Physical Exam:  General Appearance:  Alert, no distress extubated   Head:  Normocephalic, without obvious abnormality, atraumatic   Eyes:   Conjunctiva/corneas clear, EOM's intact   Neck: No thyroid enlargement. No JVD. No bruits    Lungs:   Decreased breath sounds at the bases   Heart:  Regular rhythm,Soft systolic murmur   Abdomen:   Soft, non-tender, no masses, no organomegaly   Vascular: Pulses 2+ and symmetric all extremities, no carotid bruit or jugular vein distention   Musculoskeletal:  Skin: No injury or deformity  Skin color, texture, turgor normal, no rashes or lesions, no cyanosis    Extremities: No edema   Behavior/Emotional: Appropriate, cooperative   Neurologic:                          sedated follow commands      Assessment/Plan   No new Assessment & Plan notes have been filed under this hospital service since the last note was generated.  Service: Cardiac, Interventional    Plan  Tomorrow  May do circuit change to RVAD only simple circuit and eliminate oxygenator  ECHO for RV function can do a turn down later  Was out of bed and walked hallways  Watching gases over pm            cct 40 mins           Juan M Victoria DO  4/1/2019

## 2019-04-02 NOTE — PLAN OF CARE
Problem: Patient Care Overview  Goal: Discharge Needs Assessment  Outcome: Ongoing (interventions implemented as appropriate)  Chart reviewed and discussed in rounds and CPR this am. Patient is POD #3 MVR. Was on ECMO which is now capped. Has ProtekRecently extubated. On 10 liters O2 HF. Met with patient. Lives with supportive wife who will assist with needs at home. PTA, independent with all ADLs. Denies DME use. Never had home care. Has Rx coverage with varied copays. PCP, pharmacy, and demos verified. Specific d/c needs undetermined at this time. Will make referrals in [place] for home cares in his area in event home care is appropriate. Will follow to assist with transitions of care. Business card provided.    04/01/19 1530   OR Needs Assessment   Concerns To Be Addressed care coordination/care conferences   Readmission Within The Last 30 Days no previous admission in last 30 days   Anticipated Discharge Disposition home with home health services   Type of Home Care Services nursing   Type of Outpatient Services other (comments)  (denied OP services)   Equipment Needed After Discharge none   Current Discharge Risk other (see comments)  (no risk factors identified)   Current Health   Anticipated Changes Related to Illness none   Activity/Self Care ROS   Equipment Currently Used at Home none

## 2019-04-02 NOTE — NURSING NOTE
Spoke with CHICO Sigala regarding PTT result from noon. Decision to remain at current heparin dose and recheck PTT with 6pm labs. Milrinone to be turned off per CARIE Angelo.

## 2019-04-02 NOTE — PROGRESS NOTES
Patient: Wesley Fields  Location: [address] 2023  MRN: [id number]  Today's date: 4/2/2019    Pt left supine in bed with call button accessible and RN present.    Hospital Course  Wesley STALLINGS is a 58 y.o. male admitted on 3/29/2019 with Mitral valve insufficiency, unspecified etiology [I34.0]  Mitral regurgitation [I34.0]. Principal problem is Mitral valve insufficiency.    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     Admit w/mitral insufficiency s/p MVR w/ dora cords, +thoractomy> hosp course complicated by hypoxemia, +PFO w/cardiogenic shock/R ventricular dysfunction> now on VV ECMO thru subclavian          Therapy Pain/Vitals     Row Name 04/02/19 1315 04/02/19 1330       Pain/Comfort/Sleep    Pain Charting Type Pain Assessment  --    Presence of Pain denies  --       Vital Signs    Pulse 61 65    Heart Rate Source Monitor Monitor    SpO2 99 % 95 %    Oxygen Therapy Supplemental oxygen Supplemental oxygen    O2 Delivery Method High flow nasal cannula High flow nasal cannula    O2 Flow Rate (L/min) 12 L/min 12 L/min    BP (!)  168/65 (!)  170/55    Patient Position Sitting Lying          Prior Living Environment      Most Recent Value   Lives With  spouse [wife's name: Mulugeta]   Living Arrangements  house   Living Environment Comment  2SH w/4 steps to enter w/o railing, powder room on first floor, both tub and shower stall shower on 2nd floor, bedroom on 2nd floor    Equipment Currently Used at Home  none          Prior Level of Function      Most Recent Value   Ambulation  independent   Transferring  independent   Toileting  independent   Bathing   independent   Dressing  independent   Eating  independent   Equipment Currently Used at Home  none   Prior Functional Level Comment  INd w/o AD for community distances and stairs.  Wife does most of IADl's.  Pt was working.                 PT Treatment Summary - 04/02/19 1310        Session Details    Document Type daily treatment    Mode of Treatment individual therapy;physical therapy       Time Calculation    Start Time 1310    Stop Time 1332    Time Calculation (min) 22 min       General Information    Patient Profile Reviewed? yes    Existing Precautions/Restrictions cardiac       Orientation Log    Comment grossly A&Ox3       Bed Mobility    Perry, Sit to Supine moderate assist (50% patient effort);2 person assist    Comment (Bed Mobility) assist provided to move LEs into bed and lean trunk posteriorly       Sit to Stand Transfer    Perry, Sit to Stand Transfer 2 person assist;minimum assist (75% patient effort)    Comment HHA on LUE instead of use of walker today       Stand to Sit Transfer    Perry, Stand to Sit Transfer minimum assist (75% patient effort);2 person assist       Gait Training    Perry, Gait minimum assist (75% or more patient effort)    Distance in Feet 20 feet    Gait Pattern Utilized step-through    Comment chair follow throughout, SaO2 stable >90% throughout, no AD but HHA throughout with no LOB       AM-PAC (TM) - Mobility (Current Function)    Turning from your back to your side while in a flat bed without using bedrails? 3 - A Little    Moving from lying on your back to sitting on the side of a flat bed without using bedrails? 3 - A Little    Moving to and from a bed to a chair? 3 - A Little    Standing up from a chair using your arms? 3 - A Little    To walk in a hospital room? 3 - A Little    Climbing 3-5 steps with a railing? 2 - A Lot    AM-PAC (TM) Mobility Score 17       PT Clinical Impression    Plan For Care Reviewed: Physical Therapy PT plan for care  discussed with patient    Rehab Potential/Prognosis good, to achieve stated therapy goals    PT Frequency of Treatment 5-7 times per week    Anticipated Equipment Needs at Discharge none    Daily Outcome Statement Pt walks incr distance today with VSS on VV ECMO and 15 L/min high-flow NC. Able to progress to ambulation without walker with adequate dynamic stability and without LOB. Will continue to attempt to progress ambulation ability and prevent decline in functional mobility. Rec for d/c to SNF at this time but may be possible for d/c home with assistance depending on progress.          Pain Assessment/Intervention  Pain Charting Type: Pain Assessment           Education provided this session. See the Patient Education summary report for full details.    PT Care Plan Goals      Most Recent Value   Stair Goal, PT   PT STG: Stairs  supervision required   PT STG: Number of Stairs  4      PT Care Plan Goals      Most Recent Value   Bed Mobility Goal   Date Goal Established: Bed Mobility  04/01/19   Time to Achieve Goal: Bed Mobility  5 days   Goal Activity: Bed Mobility  all bed mobility activities   Level of [place] Goal: Bed Mobility  independent   Gait Goal   Date Goal Established: Gait Training  04/01/19   Time to Achieve Goal: Gait Training  5 days   Level of [place]  supervision required   Assistive Device: Gait Training  walker, rolling   Distance Goal: Gait Training (feet)  250 feet   Goal: Gait Training  amb w/least AD    Transfer Goal   Date Goal Established: Transfer Training  04/01/19   Time to Achieve Goal: Transfer Training  5 days   Goal Activity: Transfer Training  bed to chair/chair to bed, sit to stand/stand to sit   Level of [place] Goal: Transfer Training  independent

## 2019-04-02 NOTE — ASSESSMENT & PLAN NOTE
Patient known to have mild pulmonary hypertension based on RHC prior to admission (mean PA 28 mmHg and PWCP of 14 mmHg). His hospital course has been complicated by RV failure requiring mechanical support. Continue Sildenafil 20 mg TID to help reduce RV afterload. Current plan is to recheck echocardiogram in a few days to see if improvement in RV function before discontinuation of RVAD support.

## 2019-04-02 NOTE — PROGRESS NOTES
Cardiothoracic Intensivist Progress Note       CARDIOTHORACIC   Post-Operative Day # 4     Subjective     History of Present Illness: Wesley Fields is a 58 y.o. cisgender male with history of decreased exercise capacity prior to OR s/p MVR, chronic HTN, and hyperlipidemia that are being treated.     Review of Systems:                                     Constitutional: no acute distress                                                  Eyes: denies difficulty with vision  Ears, nose, mouth, throat, and face: denies oral discomfort                                        Respiratory: denies shortness of breath                                  Cardiovascular: mild chest discomfort at incision site                                  Gastrointestinal: denies abdominal pain                                    Genitourinary: denies difficulty voiding                                      Hematologic: denies bleeding issues                                Musculoskeletal: denies muscle pain                                      Neurological: generalized weakness                                   Integumentary: denies rash    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    Social History:   Social History     Social History   • Marital status:      Spouse name: N/A   • Number of children: 4   • Years  of education: N/A     Social History Main Topics   • Smoking status: Never Smoker   • Smokeless tobacco: Never Used   • Alcohol use Yes      Comment: 2-3 beers/day   • Drug use: No   • Sexual activity: Not Asked     Other Topics Concern   • None     Social History Narrative    Patient lives with his wife in a 2 story home-bathroom on 2nd floor       Family History:   Family History   Problem Relation Age of Onset   • COPD Mother    • Multiple myeloma Mother    • Lung cancer Father    • Cancer Sister         thyroid       Objective     Vital signs in last 24 hours:  Heart Rate:  [49-66] 60  Resp:  [18-20] 18  BP: (150)/(70) 150/70      Intake/Output Summary (Last 24 hours) at 04/02/19 1033  Last data filed at 04/02/19 1000   Gross per 24 hour   Intake          2186.69 ml   Output             6530 ml   Net         -4343.31 ml     Intake/Output this shift:  I/O this shift:  In: 278.2 [P.O.:150; I.V.:128.2]  Out: 1050 [Urine:780; Chest Tube:270]    Labs  Lab Results   Component Value Date    WBC 10.30 04/02/2019    HGB 9.5 (L) 04/02/2019    HCT 29.6 (L) 04/02/2019    PLT 92 (L) 04/02/2019    ALT 50 04/02/2019     (H) 04/02/2019     04/02/2019    K 4.4 04/02/2019     04/02/2019    CREATININE 1.2 04/02/2019    BUN 22 (H) 04/02/2019    CO2 19 (L) 04/02/2019    MG 2.4 04/02/2019    PHOS 3.6 04/01/2019    INR 1.2 04/01/2019    HGBA1C 5.6 03/29/2019    PT 14.2 04/01/2019    PTT 53 (H) 04/02/2019       Full Code    Head/Ear/Nose/Throat:     NCAT.                               Eyes:     PERRL.                     Respiratory:     diminished at the bases b/l.               Cardiovascular:     1st degree AV block, mechanical heart sounds.              Gastrointestinal:     + bowel sounds.                 Genitourinary:     gambino in place.                    Extremities:     trace edema b/l lower extremities.            Musculoskeletal:      No injury or deformity.                   Neurological:     Follows  commands.       Behavior/Emotional:     Cooperative.                           Lymph:      No adenopathy noted.                               Skin:      Clean, dry and intact.     Examination of the patient performed at the bedside. Rounded with ICU team and discussed management/plan with surgical staff. Medications, radiological studies and labs reviewed and discussed with attending of record, Dr. Georgi Pepe.    Cardiothoracic Assessment and Plan:    Neurologic: A&Ox3 and pain controlled. History of metastatic lung cancer to brain which has since been treated.     Cardiovascular: Severe MR s/p MV repair now in cardiogenic shock with right heart failure, also with chronic diastolic CHF with a preserved EF and hypertension. Still on milrinone for inotropic support/myocardial stunning. RVAD placed intra-op due to worsening RV function. Unclear reason for acute decompensation as no CAD apparent and coronaries appeared to be de-aired thoroughly coming off bypass. Could also be from pinching of coronary arteries at some point during manipulation of valve. Had some ST elevations but have since resolved. RVAD oxygenator has been capped however due to labile PO2 will not be explanted until TTE shows improvement of RV function. If RV function does not show improvement, will potentially need longer RV support albeit without oxygenator. Will resuscitate, monitor MVO2/CO, wean vasoactive medication and continue to optimize cardiac medications. Appreciate Cardiology input. Continue statin for dyslipidemia.     Respiratory: I personally reviewed the most recent CXR which portrayed b/l pulmonary congestion more notable in the right lung fields (pulmonary contusion) and small lung volumes with interval worsening compared to previous exam. Has been net negative and could be component of atelectasis. Will encourage IS, mobilization and wean O2 PRN.      Genitourinary: Remove gambino. Slightly elevated creatinine which is at baseline  and with adequate GFR. Making adequate urine output. Will avoid nephrotoxic medications. Goal for net negative fluid balance.     Endocrine: Follow FSBG.     Hematologic: No evidence active bleeding. On a heparin gtt. Acute (expected) blood loss anemia from surgery. Follow plasma free Hbg to monitor for hemolysis. Secondary thrombocytopenia.     Infectious Disease: No indication for antibiotics at this time. Metastatic CA appears to be regressing by latest CT scan.     Fluids, Electrolytes: Electrolytes replaced per protocol.     Gastrointestinal: PO as tolerated. GI ppx. LFTs have trended down.     Skin: OOB, PT.      Tubes, lines and drains: Will remove superfluous invasive monitors PRN.    Disposition: Critically ill.  Right heart failure with RVAD support s/p major invasive cardiac surgery. Continue ICU care.     I personally spent 100 minutes of critical care time with this patient not including procedure time.          Antonio Lafleur,   4/2/2019

## 2019-04-02 NOTE — PATIENT CARE CONFERENCE
Care Progression Rounds Note  Date: 4/2/2019  Time: 9:51 AM     Patient Name: Wesley Fields     Medical Record Number: [id number]   YOB: 1960  Sex: Male      Room/Bed: 2023    Admitting Diagnosis: Mitral valve insufficiency, unspecified etiology [I34.0]  Mitral regurgitation [I34.0]   Admit Date/Time: 3/29/2019  5:40 AM    Primary Diagnosis: Mitral valve insufficiency  Principal Problem: Mitral valve insufficiency    GMLOS: 9.1  Anticipated Discharge Date: 4/11/2019    AM-PAC  Mobility Score: 11    Discharge Planning:  Living Arrangements: house  Concerns To Be Addressed: care coordination/care conferences  Anticipated Discharge Disposition: home with home health services  Type of Home Care Services: nursing  Type of Outpatient Services: other (comments) (denied OP services)    Barriers to Discharge:  Barriers to Discharge: Medical issues not resolved    Participants:  advanced practice provider, , nursing, physician, social work/services

## 2019-04-02 NOTE — CONSULTS
CARDIOLOGY CONSULT NOTE      Reason for consult: Pulmonary Hypertension  Referred by: Georgi Pepe MD      SUBJECTIVE    Wesley Fields is a 58 y.o. male who was admitted for Mitral valve insufficiency, unspecified etiology [I34.0]  Mitral regurgitation [I34.0].     Patient is has a PMHx significant for severe mitral regurgitation, mild pulmonary hypertension.    The patient underwent mitral valve repair on 3/29. His post-op course has been complicated by RV failure requiring mechanical support with Proteck Duo. The patient underwent an echocardiogram today which showed continued poor RV systolic function.    The patient currently feels well aside from mild neck pain. He has been up walking twice now. He denies chest discomfort, shortness of breath, palpitations. He does admit to LE edema.     His wife is at bedside who also provided history.       Allergies: Patient has no known allergies.    Home medications  •  chlorhexidine, Swish and spit 15 mL 2 (two) times a day.  •  furosemide, Take 40 mg by mouth 2 (two) times a day.  •  lorlatinib, Take 75 mg by mouth daily.  •  metoprolol succinate XL, Take 25 mg by mouth daily.  •  [] mupirocin, Apply 1 application topically 2 (two) times a day for 5 days. Nasal application, starting 5 days before surgery  •  potassium chloride (KLOR-CON ORAL), Take 10 mEq by mouth. Every other day    •  rosuvastatin, Take 10 mg by mouth daily.  •  lisinopril, Take 20 mg by mouth 2 (two) times a day.    Inpatient medications  •  acetaminophen, 975 mg, oral, q6h RIGOBERTO  •  acetylcysteine, 3 mL, nebulization, BID (6a, 6p)  •  albumin human, 500 mL, intravenous, PRN  •  alum-mag hydroxide-simeth, 30 mL, oral, q4h PRN  •  amiodarone, 400 mg, oral, BID  •  aspirin, 81 mg, oral, Daily  •  atropine, 0.5 mg, intravenous, q5 min PRN  •  bisacodyl, 10 mg, rectal, Daily PRN  •  calcium chloride, 1 g, intravenous, q6h PRN  •  chlorhexidine, 15 mL, Mouth/Throat, 2 times per day  •  colchicine,  0.6 mg, oral, Daily  •  insulin, 0-15 Units/hr, intravenous, Titrated **AND** dextrose in water, 10-30 mL, intravenous, PRN  •  diphenhydrAMINE, 25 mg, oral, q6h PRN **OR** diphenhydrAMINE, 25 mg, intravenous, q6h PRN  •  DOBUTamine, 3 mcg/kg/min, intravenous, Continuous  •  docusate sodium, 100 mg, oral, BID  •  [DISCONTINUED] famotidine, 20 mg, intravenous, BID **OR** famotidine, 20 mg, oral, BID  •  folic acid, 1 mg, oral, Daily  •  furosemide, 40 mg, intravenous, BID (9a, 10p)  •  guaiFENesin, 1,200 mg, oral, BID  •  heparin, 1,400 Units/hr, intravenous, Titrated  •  HYDROmorphone, 0.5 mg, intravenous, q1h PRN  •  insulin aspart U-100, 3 Units, subcutaneous, TID with meals  •  ipratropium-albuterol, 3 mL, nebulization, q6h RIGOBERTO  •  ketorolac, 30 mg, intravenous, q6h PRN  •  lorlatinib, 75 mg, oral, Daily  •  magnesium oxide, 400 mg, oral, BID  •  magnesium sulfate, 2 g, intravenous, PRN  •  midazolam, 2 mg, intravenous, q4h PRN  •  multivitamin, 1 tablet, oral, Daily  •  ondansetron ODT, 4 mg, oral, q8h PRN **OR** ondansetron, 4 mg, intravenous, q8h PRN  •  oxyCODONE, 10 mg, oral, q6h PRN  •  oxyCODONE, 5 mg, oral, q4h PRN  •  potassium chloride, 20 mEq, oral, BID  •  potassium chloride, 20 mEq, intravenous, q8h PRN  •  rosuvastatin, 10 mg, oral, Daily (6p)  •  senna, 1 tablet, oral, 2x daily PRN  •  senna, 1 tablet, oral, BID  •  sildenafil (antihypertensive), 20 mg, oral, TID  •  sodium bicarbonate, 45-90 mEq, intravenous, PRN  •  sodium chloride 0.9 %, , intravenous, Continuous  •  sodium chloride, 10 mL, intravenous, q8h INT  •  sodium chloride, 10 mL, intravenous, PRN  •  thiamine, 100 mg, oral, Daily  •  vasopressin (PITRESSIN) continuous infusion, 0.01-0.1 Units/min, intravenous, Continuous    History  Medical History:   Past Medical History:   Diagnosis Date   • CHF (congestive heart failure) (CMS/HCC) (HCC)    • HTN (hypertension) 3/4/2019   • Hyperlipidemia 3/4/2019   • Metastatic lung cancer  (metastasis from lung to other site) (CMS/HCC) (HCC) 3/4/2019    chemo x2, XRT   • Mitral regurgitation 3/4/2019   • Multiple thyroid nodules    • 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)    • Radiation therapy induced brain necrosis     last steroid 1/2019       Surgical History:   Past Surgical History:   Procedure Laterality Date   • CARDIAC CATHETERIZATION     • CLAVICLE SURGERY Right     biopsy   • HERNIA REPAIR         Social History:   Social History     Social History   • Marital status:      Spouse name: N/A   • Number of children: 4   • Years of education: N/A     Social History Main Topics   • Smoking status: Never Smoker   • Smokeless tobacco: Never Used   • Alcohol use Yes      Comment: 2-3 beers/day   • Drug use: No   • Sexual activity: Not Asked     Other Topics Concern   • None     Social History Narrative    Patient lives with his wife in a 2 story home-bathroom on 2nd floor       Family History:   Family History   Problem Relation Age of Onset   • COPD Mother    • Multiple myeloma Mother    • Lung cancer Father    • Cancer Sister         thyroid         Review of Systems : Otherwise negative.      OBJECTIVE  BP (!) 170/55 (Patient Position: Lying)   Pulse 62   Temp 36.2 °C (97.1 °F) (Temporal)   Resp 19   Ht 1.829 m (6')   Wt 113 kg (249 lb)   SpO2 100%   BMI 33.77 kg/m²       Intake/Output Summary (Last 24 hours) at 04/02/19 1733  Last data filed at 04/02/19 1700   Gross per 24 hour   Intake          1669.68 ml   Output             5540 ml   Net         -3870.32 ml       Physical Exam   Constitutional: Well-developed and well-nourished. No distress.   HENT: Normocephalic and atraumatic. Moist mucous membranes.  Eyes: Sclera anicteric.  Cardiovascular: Normal rate and regular rhythm.   Pulmonary/Chest: Normal effort and air entry.   Abdominal: Soft, non tender, no distension.   Musculoskeletal: +2 LE edema  Neurological: Alert and oriented to person, place, and time.   Skin:  Skin is warm and dry.   Psychiatric: Normal mood, affect and behavior.    Labs    Results from last 7 days  Lab Units 04/02/19  1511 04/02/19  0608 04/01/19  2336  04/01/19  0832 04/01/19  0343  03/31/19  0353   SODIUM mEQ/L  --  138  --   --  131* 134*  < > 138   POTASSIUM mEQ/L 4.3 4.4 4.2  < > 4.4 4.3  < > 4.2   CHLORIDE mEQ/L  --  107  --   --  103 105  < > 108   CO2 mEQ/L  --  19*  --   --  19* 21*  < > 22   BUN mg/dL  --  22*  --   --  18 17  < > 16   CREATININE mg/dL  --  1.2  --   --  1.2 1.2  < > 1.2   CALCIUM mg/dL  --  8.1*  --   --  8.1* 8.1*  < > 8.5*   ALBUMIN g/dL  --  2.7*  --   --   --  2.8*  --  2.8*   BILIRUBIN TOTAL mg/dL  --  0.9  --   --   --  0.8  --  0.8   ALK PHOS IU/L  --  60  --   --   --  48  --  29*   ALT IU/L  --  50  --   --   --  54  --  56   AST IU/L  --  112*  --   --   --  142*  --  201*   GLUCOSE mg/dL  --  122*  --   --  135* 126*  < > 124*   < > = values in this interval not displayed.            Results from last 7 days  Lab Units 04/02/19  1157 04/02/19  0608 04/01/19  2336   WBC K/uL 10.30 10.30 10.94*   HEMOGLOBIN g/dL 9.8* 9.5* 10.0*   HEMATOCRIT % 30.1* 29.6* 30.0*   PLATELETS K/uL 99* 92* 102*         Cardiology results  Telemetry:  SR at 60 bpm  TTE:  Cardiac Imaging   TRANSTHORACIC ECHO (TTE) LIMITED 04/02/2019    Narrative Limited study for RVAD wean  1. Mild concentric left ventricular hypertrophy with normal cavity size   and preserved systolic function. Cannot adequately assess regional wall   motion abnormalities. Abnormal septal motion.  2. Moderately dilated right ventricular size with severely decreased   systolic function (TAPSE 0.75-0.8cm).  The right ventricular outflow tract   however displays some contraction.  Blancas sign is also present.    Protek-Duo Catheter is visualized in the right-sided cardiac structures.   During wean of RVAD there is a slight increase in right ventricular size   without appreciable change in RV function by TAPSE.   3. Grossly  normal aortic valve.   4. Thickened mitral leaflets. Status post mitral valve repair with   annuloplasty ring. No obvious mitral regurgitation on the limited views   obtained.  5. Mildly dilated left atrium.  6. Mild tricuspid regurgitation.  7. Trace posterior pericardial effusion.               ASSESSMENT AND PLAN  58 y.o. male, caridology being consulted for being consulted for:   Pulmonary hypertension (CMS/HCC) ([facility])   Assessment & Plan    Patient known to have mild pulmonary hypertension based on RHC prior to admission (mean PA 28 mmHg and PWCP of 14 mmHg). His hospital course has been complicated by RV failure requiring mechanical support. To help off load the RV, would recommend starting Sildenafil 20 mg TID and continue to monitor hemodynamics.         Right ventricular dysfunction   Assessment & Plan    Management per CT surgery with continued mechanical support.         * Mitral valve insufficiency   Assessment & Plan    He is status post mitral valve repair. Management per CT surgery.             Za Norman DO  4/2/2019  5:33 PM

## 2019-04-03 ENCOUNTER — APPOINTMENT (INPATIENT)
Dept: RADIOLOGY | Facility: HOSPITAL | Age: 59
DRG: 003 | End: 2019-04-03
Attending: NURSE PRACTITIONER
Payer: COMMERCIAL

## 2019-04-03 ENCOUNTER — APPOINTMENT (INPATIENT)
Dept: RADIOLOGY | Facility: HOSPITAL | Age: 59
DRG: 003 | End: 2019-04-03
Attending: PHYSICIAN ASSISTANT
Payer: COMMERCIAL

## 2019-04-03 LAB
ALBUMIN SERPL-MCNC: 2.5 G/DL (ref 3.4–5)
ALP SERPL-CCNC: 58 IU/L (ref 35–126)
ALT SERPL-CCNC: 49 IU/L (ref 16–63)
ANION GAP SERPL CALC-SCNC: 8 MEQ/L (ref 3–15)
APTT PPP: 42 SEC (ref 23–35)
APTT PPP: 42 SEC (ref 23–35)
APTT PPP: 45 SEC (ref 23–35)
APTT PPP: 45 SEC (ref 23–35)
AST SERPL-CCNC: 92 IU/L (ref 15–41)
BASE EXCESS BLDA CALC-SCNC: -0.1 MEQ/L
BASE EXCESS BLDA CALC-SCNC: -0.6 MEQ/L
BASE EXCESS BLDA CALC-SCNC: -0.7 MEQ/L
BASE EXCESS BLDA CALC-SCNC: -1.1 MEQ/L
BASE EXCESS BLDA CALC-SCNC: 0.1 MEQ/L
BILIRUB SERPL-MCNC: 0.7 MG/DL (ref 0.3–1.2)
BUN SERPL-MCNC: 22 MG/DL (ref 8–20)
CA-I BLD-SCNC: 1.09 MMOL/L (ref 1.15–1.27)
CA-I BLD-SCNC: 1.13 MMOL/L (ref 1.15–1.27)
CA-I BLD-SCNC: 1.14 MMOL/L (ref 1.15–1.27)
CA-I BLD-SCNC: 1.16 MMOL/L (ref 1.15–1.27)
CA-I BLD-SCNC: 1.17 MMOL/L (ref 1.15–1.27)
CALCIUM SERPL-MCNC: 8.4 MG/DL (ref 8.9–10.3)
CHLORIDE SERPL-SCNC: 109 MEQ/L (ref 98–109)
CO2 BLDA-SCNC: 24 MEQ/L (ref 22–32)
CO2 BLDA-SCNC: 25 MEQ/L (ref 22–32)
CO2 BLDA-SCNC: 25 MEQ/L (ref 22–32)
CO2 SERPL-SCNC: 21 MEQ/L (ref 22–32)
CREAT SERPL-MCNC: 1 MG/DL
ERYTHROCYTE [DISTWIDTH] IN BLOOD BY AUTOMATED COUNT: 15 % (ref 11.6–14.4)
ERYTHROCYTE [DISTWIDTH] IN BLOOD BY AUTOMATED COUNT: 15 % (ref 11.6–14.4)
ERYTHROCYTE [DISTWIDTH] IN BLOOD BY AUTOMATED COUNT: 15.4 % (ref 11.6–14.4)
FIBRINOGEN PPP-MCNC: 437 MG/DL (ref 220–480)
FIBRINOGEN PPP-MCNC: 502 MG/DL (ref 220–480)
FIBRINOGEN PPP-MCNC: 596 MG/DL (ref 220–480)
FIBRINOGEN PPP-MCNC: 596 MG/DL (ref 220–480)
GFR SERPL CREATININE-BSD FRML MDRD: >60 ML/MIN/1.73M*2
GLUCOSE BLDA-MCNC: 107 MG/DL (ref 65–95)
GLUCOSE BLDA-MCNC: 112 MG/DL (ref 65–95)
GLUCOSE BLDA-MCNC: 124 MG/DL (ref 65–95)
GLUCOSE SERPL-MCNC: 109 MG/DL (ref 70–99)
HCO3 BLDA-SCNC: 23 MEQ/L (ref 21–28)
HCO3 BLDA-SCNC: 24 MEQ/L (ref 21–28)
HCO3 BLDA-SCNC: 24 MEQ/L (ref 21–28)
HCT VFR BLDA CALC: 31 % (ref 36–48)
HCT VFR BLDA CALC: 33 % (ref 36–48)
HCT VFR BLDA CALC: 33 % (ref 36–48)
HCT VFR BLDA CALC: 35 % (ref 36–48)
HCT VFR BLDA CALC: 36 % (ref 36–48)
HCT VFR BLDCO AUTO: 30.6 %
HCT VFR BLDCO AUTO: 30.7 %
HCT VFR BLDCO AUTO: 32.5 %
HGB BLD-MCNC: 10.6 G/DL
HGB BLD-MCNC: 9.7 G/DL
HGB BLD-MCNC: 9.9 G/DL
HGB FREE PLAS-MCNC: <30 MG/DL
LACTATE BLDA-SCNC: 0.6 MMOL/L (ref 0.4–1.6)
LACTATE BLDA-SCNC: 0.8 MMOL/L (ref 0.4–1.6)
MAGNESIUM SERPL-MCNC: 2.2 MG/DL (ref 1.8–2.5)
MAGNESIUM SERPL-MCNC: 2.3 MG/DL (ref 1.8–2.5)
MAGNESIUM SERPL-MCNC: 2.3 MG/DL (ref 1.8–2.5)
MCH RBC QN AUTO: 28.7 PG (ref 28–33.2)
MCH RBC QN AUTO: 28.8 PG (ref 28–33.2)
MCH RBC QN AUTO: 29 PG (ref 28–33.2)
MCHC RBC AUTO-ENTMCNC: 31.7 G/DL (ref 32.2–36.5)
MCHC RBC AUTO-ENTMCNC: 32.2 G/DL (ref 32.2–36.5)
MCHC RBC AUTO-ENTMCNC: 32.6 G/DL (ref 32.2–36.5)
MCV RBC AUTO: 89 FL (ref 83–98)
MCV RBC AUTO: 89.2 FL (ref 83–98)
MCV RBC AUTO: 90.5 FL (ref 83–98)
PCO2 BLDA: 32 MM HG (ref 35–48)
PCO2 BLDA: 32 MM HG (ref 35–48)
PCO2 BLDA: 34 MM HG (ref 35–48)
PCO2 BLDA: 34 MM HG (ref 35–48)
PCO2 BLDA: 38 MM HG (ref 35–48)
PDW BLD AUTO: 10.6 FL (ref 9.4–12.4)
PDW BLD AUTO: 10.6 FL (ref 9.4–12.4)
PDW BLD AUTO: 10.7 FL (ref 9.4–12.4)
PH BLDA: 7.4 PH (ref 7.35–7.45)
PH BLDA: 7.44 PH (ref 7.35–7.45)
PH BLDA: 7.45 PH (ref 7.35–7.45)
PH BLDA: 7.46 PH (ref 7.35–7.45)
PH BLDA: 7.47 PH (ref 7.35–7.45)
PLATELET # BLD AUTO: 87 K/UL
PLATELET # BLD AUTO: 90 K/UL
PLATELET # BLD AUTO: 92 K/UL
PO2 BLDA: 131 MM HG (ref 83–100)
PO2 BLDA: 63 MM HG (ref 83–100)
PO2 BLDA: 66 MM HG (ref 83–100)
PO2 BLDA: 74 MM HG (ref 83–100)
PO2 BLDA: 90 MM HG (ref 83–100)
POCT PATIENT TEMPERATURE: 98.6 °F (ref 97–99)
POCT TEST (BLD GAS): ABNORMAL
POTASSIUM BLDA-SCNC: 4 MEQ/L (ref 3.4–4.5)
POTASSIUM BLDA-SCNC: 4.2 MEQ/L (ref 3.4–4.5)
POTASSIUM BLDA-SCNC: 4.2 MEQ/L (ref 3.4–4.5)
POTASSIUM BLDA-SCNC: 4.5 MEQ/L (ref 3.4–4.5)
POTASSIUM BLDA-SCNC: 4.5 MEQ/L (ref 3.4–4.5)
POTASSIUM SERPL-SCNC: 4.1 MEQ/L (ref 3.6–5.1)
POTASSIUM SERPL-SCNC: 4.4 MEQ/L (ref 3.6–5.1)
POTASSIUM SERPL-SCNC: 4.6 MEQ/L (ref 3.6–5.1)
PROT SERPL-MCNC: 4.6 G/DL (ref 6–8.2)
RBC # BLD AUTO: 3.38 M/UL (ref 4.5–5.8)
RBC # BLD AUTO: 3.44 M/UL (ref 4.5–5.8)
RBC # BLD AUTO: 3.65 M/UL (ref 4.5–5.8)
SAO2 % BLDA: 93 % (ref 93–98)
SAO2 % BLDA: 94 % (ref 93–98)
SAO2 % BLDA: 95 % (ref 93–98)
SAO2 % BLDA: 97 % (ref 93–98)
SAO2 % BLDA: 99 % (ref 93–98)
SAO2 % BLDV: 57.8 % (ref 30–60)
SAO2 % BLDV: 60.1 % (ref 30–60)
SAO2 % BLDV: 66.8 % (ref 30–60)
SAO2 % BLDV: 74.6 % (ref 30–60)
SODIUM BLDA-SCNC: 135 MEQ/L (ref 136–145)
SODIUM BLDA-SCNC: 136 MEQ/L (ref 136–145)
SODIUM BLDA-SCNC: 136 MEQ/L (ref 136–145)
SODIUM BLDA-SCNC: 137 MEQ/L (ref 136–145)
SODIUM BLDA-SCNC: 137 MEQ/L (ref 136–145)
SODIUM SERPL-SCNC: 138 MEQ/L (ref 136–144)
WBC # BLD AUTO: 10.82 K/UL
WBC # BLD AUTO: 8.94 K/UL
WBC # BLD AUTO: 9.31 K/UL

## 2019-04-03 PROCEDURE — 63700000 HC SELF-ADMINISTRABLE DRUG: Performed by: PHYSICIAN ASSISTANT

## 2019-04-03 PROCEDURE — 63700000 HC SELF-ADMINISTRABLE DRUG: Performed by: NURSE PRACTITIONER

## 2019-04-03 PROCEDURE — 63600000 HC DRUGS/DETAIL CODE: Performed by: NURSE PRACTITIONER

## 2019-04-03 PROCEDURE — 94640 AIRWAY INHALATION TREATMENT: CPT

## 2019-04-03 PROCEDURE — 25000000 HC PHARMACY GENERAL: Performed by: PHYSICIAN ASSISTANT

## 2019-04-03 PROCEDURE — 20000000 HC ROOM AND CARE ICU

## 2019-04-03 PROCEDURE — 36415 COLL VENOUS BLD VENIPUNCTURE: CPT | Performed by: CLINICAL NURSE SPECIALIST

## 2019-04-03 PROCEDURE — 71045 X-RAY EXAM CHEST 1 VIEW: CPT

## 2019-04-03 PROCEDURE — 99291 CRITICAL CARE FIRST HOUR: CPT | Mod: 25 | Performed by: ANESTHESIOLOGY

## 2019-04-03 PROCEDURE — 83735 ASSAY OF MAGNESIUM: CPT | Performed by: CLINICAL NURSE SPECIALIST

## 2019-04-03 PROCEDURE — 84132 ASSAY OF SERUM POTASSIUM: CPT | Performed by: CLINICAL NURSE SPECIALIST

## 2019-04-03 PROCEDURE — 83051 HEMOGLOBIN PLASMA: CPT | Performed by: CLINICAL NURSE SPECIALIST

## 2019-04-03 PROCEDURE — 25800000 HC PHARMACY IV SOLUTIONS: Performed by: PHYSICIAN ASSISTANT

## 2019-04-03 PROCEDURE — 63600000 HC DRUGS/DETAIL CODE: Performed by: PHYSICIAN ASSISTANT

## 2019-04-03 PROCEDURE — 97116 GAIT TRAINING THERAPY: CPT | Mod: GP

## 2019-04-03 PROCEDURE — 83735 ASSAY OF MAGNESIUM: CPT | Performed by: PHYSICIAN ASSISTANT

## 2019-04-03 PROCEDURE — 80053 COMPREHEN METABOLIC PANEL: CPT | Performed by: CLINICAL NURSE SPECIALIST

## 2019-04-03 PROCEDURE — 99233 SBSQ HOSP IP/OBS HIGH 50: CPT | Performed by: INTERNAL MEDICINE

## 2019-04-03 PROCEDURE — 82810 BLOOD GASES O2 SAT ONLY: CPT | Performed by: CLINICAL NURSE SPECIALIST

## 2019-04-03 PROCEDURE — 85730 THROMBOPLASTIN TIME PARTIAL: CPT | Performed by: NURSE PRACTITIONER

## 2019-04-03 PROCEDURE — 99292 CRITICAL CARE ADDL 30 MIN: CPT | Performed by: ANESTHESIOLOGY

## 2019-04-03 PROCEDURE — 85384 FIBRINOGEN ACTIVITY: CPT | Performed by: CLINICAL NURSE SPECIALIST

## 2019-04-03 PROCEDURE — 85027 COMPLETE CBC AUTOMATED: CPT | Performed by: CLINICAL NURSE SPECIALIST

## 2019-04-03 PROCEDURE — 48000013 HC VENTRICULAR ASSIST, ECMO HOURLY

## 2019-04-03 RX ORDER — MUPIROCIN 20 MG/G
1 OINTMENT TOPICAL 3 TIMES DAILY
Status: DISCONTINUED | OUTPATIENT
Start: 2019-04-03 | End: 2019-04-05

## 2019-04-03 RX ORDER — OXYMETAZOLINE HCL 0.05 %
1 SPRAY, NON-AEROSOL (ML) NASAL 2 TIMES DAILY PRN
Status: DISCONTINUED | OUTPATIENT
Start: 2019-04-03 | End: 2019-04-06

## 2019-04-03 RX ORDER — IBUPROFEN/PSEUDOEPHEDRINE HCL 200MG-30MG
3 TABLET ORAL NIGHTLY PRN
Status: DISCONTINUED | OUTPATIENT
Start: 2019-04-03 | End: 2019-04-04

## 2019-04-03 RX ADMIN — Medication 10 ML: at 08:16

## 2019-04-03 RX ADMIN — FAMOTIDINE 20 MG: 20 TABLET, FILM COATED ORAL at 08:15

## 2019-04-03 RX ADMIN — MUPIROCIN 1 APPLICATION.: 20 OINTMENT TOPICAL at 19:39

## 2019-04-03 RX ADMIN — INSULIN ASPART 3 UNITS: 100 INJECTION, SOLUTION INTRAVENOUS; SUBCUTANEOUS at 08:57

## 2019-04-03 RX ADMIN — Medication 10 ML: at 01:09

## 2019-04-03 RX ADMIN — SALINE NASAL SPRAY 2 SPRAY: 1.5 SOLUTION NASAL at 21:40

## 2019-04-03 RX ADMIN — SILDENAFIL 20 MG: 20 TABLET ORAL at 14:17

## 2019-04-03 RX ADMIN — DOCUSATE SODIUM 100 MG: 100 CAPSULE, LIQUID FILLED ORAL at 08:15

## 2019-04-03 RX ADMIN — ACETYLCYSTEINE 600 MG: 200 INHALANT RESPIRATORY (INHALATION) at 20:45

## 2019-04-03 RX ADMIN — ROSUVASTATIN CALCIUM 10 MG: 10 TABLET, FILM COATED ORAL at 18:12

## 2019-04-03 RX ADMIN — HEPARIN SODIUM AND DEXTROSE 1400 UNITS/HR: 10000; 5 INJECTION INTRAVENOUS at 13:28

## 2019-04-03 RX ADMIN — MULTIPLE VITAMINS W/ MINERALS TAB 1 TABLET: TAB at 08:15

## 2019-04-03 RX ADMIN — KETOROLAC TROMETHAMINE 30 MG: 30 INJECTION, SOLUTION INTRAMUSCULAR; INTRAVENOUS at 05:02

## 2019-04-03 RX ADMIN — FAMOTIDINE 20 MG: 20 TABLET, FILM COATED ORAL at 19:38

## 2019-04-03 RX ADMIN — ACETAMINOPHEN 975 MG: 325 TABLET ORAL at 18:12

## 2019-04-03 RX ADMIN — AMIODARONE HYDROCHLORIDE 400 MG: 200 TABLET ORAL at 08:15

## 2019-04-03 RX ADMIN — CALCIUM CHLORIDE 1 G: 100 INJECTION, SOLUTION INTRAVENOUS at 01:09

## 2019-04-03 RX ADMIN — POTASSIUM CHLORIDE 20 MEQ: 750 TABLET, FILM COATED, EXTENDED RELEASE ORAL at 19:39

## 2019-04-03 RX ADMIN — COLCHICINE 0.6 MG: 0.6 TABLET, FILM COATED ORAL at 08:15

## 2019-04-03 RX ADMIN — ASPIRIN 81 MG: 81 TABLET, COATED ORAL at 08:15

## 2019-04-03 RX ADMIN — Medication 100 MG: at 08:15

## 2019-04-03 RX ADMIN — CHLORHEXIDINE GLUCONATE 15 ML: 1.2 RINSE ORAL at 21:40

## 2019-04-03 RX ADMIN — AMIODARONE HYDROCHLORIDE 400 MG: 200 TABLET ORAL at 19:38

## 2019-04-03 RX ADMIN — Medication 3 MG: at 21:39

## 2019-04-03 RX ADMIN — ACETAMINOPHEN 975 MG: 325 TABLET ORAL at 05:02

## 2019-04-03 RX ADMIN — FOLIC ACID 1 MG: 1 TABLET ORAL at 08:15

## 2019-04-03 RX ADMIN — Medication 10 ML: at 16:20

## 2019-04-03 RX ADMIN — POTASSIUM CHLORIDE 20 MEQ: 750 TABLET, FILM COATED, EXTENDED RELEASE ORAL at 08:14

## 2019-04-03 RX ADMIN — FUROSEMIDE 40 MG: 10 INJECTION, SOLUTION INTRAMUSCULAR; INTRAVENOUS at 08:15

## 2019-04-03 RX ADMIN — MAGNESIUM GLUCONATE 500 MG ORAL TABLET 400 MG: 500 TABLET ORAL at 19:38

## 2019-04-03 RX ADMIN — FUROSEMIDE 40 MG: 10 INJECTION, SOLUTION INTRAMUSCULAR; INTRAVENOUS at 16:20

## 2019-04-03 RX ADMIN — ACETAMINOPHEN 975 MG: 325 TABLET ORAL at 12:38

## 2019-04-03 RX ADMIN — IPRATROPIUM BROMIDE AND ALBUTEROL SULFATE 3 ML: 2.5; .5 SOLUTION RESPIRATORY (INHALATION) at 07:55

## 2019-04-03 RX ADMIN — CHLORHEXIDINE GLUCONATE 15 ML: 1.2 RINSE ORAL at 12:38

## 2019-04-03 RX ADMIN — INSULIN ASPART 3 UNITS: 100 INJECTION, SOLUTION INTRAVENOUS; SUBCUTANEOUS at 12:34

## 2019-04-03 RX ADMIN — STANDARDIZED SENNA CONCENTRATE 1 TABLET: 8.6 TABLET ORAL at 08:15

## 2019-04-03 RX ADMIN — GUAIFENESIN 1200 MG: 600 TABLET, EXTENDED RELEASE ORAL at 08:15

## 2019-04-03 RX ADMIN — MAGNESIUM GLUCONATE 500 MG ORAL TABLET 400 MG: 500 TABLET ORAL at 08:15

## 2019-04-03 RX ADMIN — SILDENAFIL 20 MG: 20 TABLET ORAL at 19:39

## 2019-04-03 RX ADMIN — ACETYLCYSTEINE 800 MG: 200 INHALANT RESPIRATORY (INHALATION) at 07:59

## 2019-04-03 RX ADMIN — SILDENAFIL 20 MG: 20 TABLET ORAL at 08:15

## 2019-04-03 RX ADMIN — GUAIFENESIN 1200 MG: 600 TABLET, EXTENDED RELEASE ORAL at 19:38

## 2019-04-03 RX ADMIN — IPRATROPIUM BROMIDE AND ALBUTEROL SULFATE 3 ML: 2.5; .5 SOLUTION RESPIRATORY (INHALATION) at 20:45

## 2019-04-03 ASSESSMENT — COGNITIVE AND FUNCTIONAL STATUS - GENERAL
WALKING IN HOSPITAL ROOM: 3 - A LITTLE
STANDING UP FROM CHAIR USING ARMS: 3 - A LITTLE
CLIMB 3 TO 5 STEPS WITH RAILING: 2 - A LOT
MOVING TO AND FROM BED TO CHAIR: 3 - A LITTLE

## 2019-04-03 NOTE — PLAN OF CARE
Problem: Patient Care Overview  Goal: Plan of Care Review   04/03/19 0018   Coping/Psychosocial   Plan Of Care Reviewed With patient   Plan of Care Review   Progress progress toward functional goals as expected   Outcome Summary Increasing activity, O2 weaned to 8L.

## 2019-04-03 NOTE — PROGRESS NOTES
Cardiology Progress Note    SUBJECTIVE  Patient tolerated Sildenafil well. Chest tubes removed this AM. Patient feels well without complaint.     Inpatient medications:  •  acetaminophen, 975 mg, oral, q6h RIGOBERTO  •  acetylcysteine, 3 mL, nebulization, BID (6a, 6p)  •  albumin human, 500 mL, intravenous, PRN  •  alum-mag hydroxide-simeth, 30 mL, oral, q4h PRN  •  amiodarone, 400 mg, oral, BID  •  aspirin, 81 mg, oral, Daily  •  atropine, 0.5 mg, intravenous, q5 min PRN  •  bisacodyl, 10 mg, rectal, Daily PRN  •  calcium chloride, 1 g, intravenous, q6h PRN  •  chlorhexidine, 15 mL, Mouth/Throat, 2 times per day  •  colchicine, 0.6 mg, oral, Daily  •  insulin, 0-15 Units/hr, intravenous, Titrated **AND** dextrose in water, 10-30 mL, intravenous, PRN  •  diphenhydrAMINE, 25 mg, oral, q6h PRN **OR** diphenhydrAMINE, 25 mg, intravenous, q6h PRN  •  DOBUTamine, 3 mcg/kg/min, intravenous, Continuous  •  docusate sodium, 100 mg, oral, BID  •  [DISCONTINUED] famotidine, 20 mg, intravenous, BID **OR** famotidine, 20 mg, oral, BID  •  folic acid, 1 mg, oral, Daily  •  furosemide, 40 mg, intravenous, BID (9a, 4p)  •  guaiFENesin, 1,200 mg, oral, BID  •  heparin, 1,400 Units/hr, intravenous, Titrated  •  HYDROmorphone, 0.5 mg, intravenous, q1h PRN  •  insulin aspart U-100, 3 Units, subcutaneous, TID with meals  •  ipratropium-albuterol, 3 mL, nebulization, q6h RIGOBERTO  •  lorlatinib, 75 mg, oral, Daily  •  magnesium oxide, 400 mg, oral, BID  •  magnesium sulfate, 2 g, intravenous, PRN  •  midazolam, 2 mg, intravenous, q4h PRN  •  multivitamin, 1 tablet, oral, Daily  •  ondansetron ODT, 4 mg, oral, q8h PRN **OR** ondansetron, 4 mg, intravenous, q8h PRN  •  oxyCODONE, 10 mg, oral, q6h PRN  •  oxyCODONE, 5 mg, oral, q4h PRN  •  potassium chloride, 20 mEq, oral, BID  •  potassium chloride, 20 mEq, intravenous, q8h PRN  •  rosuvastatin, 10 mg, oral, Daily (6p)  •  senna, 1 tablet, oral, 2x daily PRN  •  senna, 1 tablet, oral, BID  •   sildenafil (antihypertensive), 20 mg, oral, TID  •  sodium bicarbonate, 45-90 mEq, intravenous, PRN  •  sodium chloride 0.9 %, , intravenous, Continuous  •  sodium chloride, 10 mL, intravenous, q8h INT  •  sodium chloride, 10 mL, intravenous, PRN  •  thiamine, 100 mg, oral, Daily  •  vasopressin (PITRESSIN) continuous infusion, 0.01-0.1 Units/min, intravenous, Continuous  •  zolpidem, 5 mg, oral, Nightly PRN    Review of Systems: Review of systems otherwise normal.    OBJECTIVE:   BP (!) 167/59   Pulse 66   Temp (!) 36 °C (96.8 °F) (Temporal)   Resp 18   Ht 1.829 m (6')   Wt 113 kg (249 lb)   SpO2 94%   BMI 33.77 kg/m²       Intake/Output Summary (Last 24 hours) at 04/03/19 1149  Last data filed at 04/03/19 1100   Gross per 24 hour   Intake          1126.68 ml   Output             3470 ml   Net         -2343.32 ml       Physical Exam   Constitutional: Well-developed and well-nourished. No distress.   HENT: Normocephalic and atraumatic. Moist mucous membranes.  Eyes: Sclera anicteric.  Cardiovascular: Normal rate and regular rhythm.   Pulmonary/Chest: Normal effort and air entry.   Abdominal: Soft, non tender, no distension.   Musculoskeletal: +2 LE edema  Neurological: Alert and oriented to person, place, and time.   Skin: Skin is warm and dry.   Psychiatric: Normal mood, affect and behavior.    Labs    Results from last 7 days  Lab Units 04/03/19  0518 04/02/19  1511 04/02/19  0608  04/01/19  0832 04/01/19  0343   SODIUM mEQ/L 138  --  138  --  131* 134*   POTASSIUM mEQ/L 4.4 4.3 4.4  < > 4.4 4.3   CHLORIDE mEQ/L 109  --  107  --  103 105   CO2 mEQ/L 21*  --  19*  --  19* 21*   BUN mg/dL 22*  --  22*  --  18 17   CREATININE mg/dL 1.0  --  1.2  --  1.2 1.2   CALCIUM mg/dL 8.4*  --  8.1*  --  8.1* 8.1*   ALBUMIN g/dL 2.5*  --  2.7*  --   --  2.8*   BILIRUBIN TOTAL mg/dL 0.7  --  0.9  --   --  0.8   ALK PHOS IU/L 58  --  60  --   --  48   ALT IU/L 49  --  50  --   --  54   AST IU/L 92*  --  112*  --   --  142*    GLUCOSE mg/dL 109*  --  122*  --  135* 126*   < > = values in this interval not displayed.    Results from last 7 days  Lab Units 04/03/19  0518 04/02/19  2314 04/02/19  1813   WBC K/uL 8.94 9.64 9.71   HEMOGLOBIN g/dL 9.7* 9.6* 9.8*   HEMATOCRIT % 30.6* 29.5* 30.3*   PLATELETS K/uL 92* 88* 89*             Cardiology results  TTE:   Cardiac Imaging   TRANSTHORACIC ECHO (TTE) LIMITED 04/02/2019    Narrative Limited study for RVAD wean  1. Mild concentric left ventricular hypertrophy with normal cavity size   and preserved systolic function. Cannot adequately assess regional wall   motion abnormalities. Abnormal septal motion.  2. Moderately dilated right ventricular size with severely decreased   systolic function (TAPSE 0.75-0.8cm).  The right ventricular outflow tract   however displays some contraction.  Blancas sign is also present.    Protek-Duo Catheter is visualized in the right-sided cardiac structures.   During wean of RVAD there is a slight increase in right ventricular size   without appreciable change in RV function by TAPSE.   3. Grossly normal aortic valve.   4. Thickened mitral leaflets. Status post mitral valve repair with   annuloplasty ring. No obvious mitral regurgitation on the limited views   obtained.  5. Mildly dilated left atrium.  6. Mild tricuspid regurgitation.  7. Trace posterior pericardial effusion.            Imaging: Imaging reviewed.    ASSESSMENT AND PLAN  58 y.o. male admitted for Mitral valve insufficiency, unspecified etiology [I34.0]  Mitral regurgitation [I34.0], Cardiology consulted for:    Pulmonary hypertension (CMS/HCC) ([facility])   Assessment & Plan    Patient known to have mild pulmonary hypertension based on RHC prior to admission (mean PA 28 mmHg and PWCP of 14 mmHg). His hospital course has been complicated by RV failure requiring mechanical support. Continue Sildenafil 20 mg TID to help reduce RV afterload. Consider repeat limited echocardiogram tomorrow to assess RV  function on Sildenafil.        Right ventricular dysfunction   Assessment & Plan    Management per CT surgery with continued mechanical support.         * Mitral valve insufficiency   Assessment & Plan    He is status post mitral valve repair. Management per CT surgery.             Za Norman DO  4/3/2019  11:49 AM

## 2019-04-03 NOTE — PROGRESS NOTES
Discussed in rounds this am. Protek to remain for a few more days. To pause so wires can be removed. To repeat Echo. SANTI 4/15/19. [facility]. Patient and wife agreeable to [facility]. Will follow to assist with transitions of care.

## 2019-04-03 NOTE — PLAN OF CARE
Problem: Patient Care Overview  Goal: Plan of Care Review  Outcome: Ongoing (interventions implemented as appropriate)   04/03/19 7483   Coping/Psychosocial   Plan Of Care Reviewed With patient   Plan of Care Review   Progress progress towards functional goals is fair   Outcome Summary Pt required ECMO perfusionist, chair follow, and 1 nsg specific for ECMO lines, and 1 person min A for func mob       Problem: Cardiac Surgery (Adult)  Goal: Signs and Symptoms of Listed Potential Problems Will be Absent, Minimized or Managed (Cardiac Surgery)  Outcome: Ongoing (interventions implemented as appropriate)

## 2019-04-03 NOTE — PLAN OF CARE
Problem: Fall Risk (Adult)  Goal: Absence of Falls  Outcome: Ongoing (interventions implemented as appropriate)   04/03/19 0018   Fall Risk (Adult)   Absence of Falls making progress toward outcome

## 2019-04-03 NOTE — CONSULTS
ENT Consult Note    Subjective     Wesley Fields is a 58 y.o. male who was admitted for Mitral valve insufficiency, unspecified etiology [I34.0]  Mitral regurgitation [I34.0]. I was asked to evaluate for epistaxis. Patient with slow oozing from both nares. Worse on the right than left. Currently on Heparin gtts for RVAD. Was using NC oxygen previously.     Outside records reviewed..    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)    • Radiation therapy induced brain necrosis     last steroid 1/2019       Surgical History:   Past Surgical History:   Procedure Laterality Date   • CARDIAC CATHETERIZATION     • CLAVICLE SURGERY Right     biopsy   • HERNIA REPAIR         Social History:   Social History     Social History   • Marital status:      Spouse name: N/A   • Number of children: 4   • Years of education: N/A     Social History Main Topics   • Smoking status: Never Smoker   • Smokeless tobacco: Never Used   • Alcohol use Yes      Comment: 2-3 beers/day   • Drug use: No   • Sexual activity: Not Asked     Other Topics Concern   • None     Social History Narrative    Patient lives with his wife in a 2 story home-bathroom on 2nd floor       Family History:   Family History   Problem Relation Age of Onset   • COPD Mother    • Multiple myeloma Mother    • Lung cancer Father    • Cancer Sister         thyroid       Allergies: Patient has no known allergies.    [unfilled]     Medication List      ASK your doctor about these medications    chlorhexidine 0.12 % solution  Commonly known as:  PERIDEX  Swish and spit 15 mL 2 (two) times a day.  Dose:  15 mL     furosemide 40 mg  tablet  Commonly known as:  LASIX  Take 40 mg by mouth 2 (two) times a day.  Dose:  40 mg     KLOR-CON ORAL  Take 10 mEq by mouth. Every other day  Dose:  10 mEq     lisinopril 20 mg tablet  Commonly known as:  PRINIVIL  Take 20 mg by mouth 2 (two) times a day.  Dose:  20 mg     LORBRENA 25 mg tablet  Take 75 mg by mouth daily.  Dose:  75 mg  Generic drug:  lorlatinib     metoprolol succinate XL 25 mg 24 hr tablet  Commonly known as:  TOPROL-XL  Take 25 mg by mouth daily.  Dose:  25 mg     mupirocin 2 % ointment  Commonly known as:  BACTROBAN  Apply 1 application topically 2 (two) times a day for 5 days. Nasal application, starting 5 days before surgery  Dose:  1 application  Ask about: Should I take this medication?     rosuvastatin 10 mg tablet  Commonly known as:  CRESTOR  Take 10 mg by mouth daily.  Dose:  10 mg          Review of Systems  Pertinent items are noted in HPI.    Objective   Labs  No new labs.    Imaging/Testing  Not applicable    ECG   Not applicable.    Physical Exam  Well-developed well-nourished male.  Resting comfortably in bed.  No apparent distress.  Nasal cavity: Bloody discharge noted bilaterally.  Nasal cavity examined with anterior rhinoscopy and headlight.  Large clots removed from the nasal cavity via suction.  Upon examination of the septum bloody oozing noted from the anterior John box plexus on the right-hand side inferiorly along a mild inferior septal spur.  A second area of oozing noted superiorly bilateral.  Cavity decongested with oxymetazoline.  Nasal cautery performed with silver nitrate on each side of the septum in the areas mentioned above.  There is diffuse excoriation of the anterior septum on the right-hand side but no active bleeding.  No further bleeding was noted after cautery was performed.  bacitracin was gently applied to the anterior nasal cavity bilaterally.  The area was examined for approximately 5 minutes with no further bleeding noted.  Oral  cavity/oropharynx: No bleeding noted in the oropharynx.  Mucosa normal.  Uvula midline.    Assessment   58 y.o. male being consulted for epistaxis in the setting of anticoagulation and nasal cannula oxygen SPECT this is from irritation from the nasal cannula oxygen as well as anticoagulation.    Plan     Areas on both sides of the septum appear to be treated adequately with silver nitrate cautery.  I would try to avoid placing any packing as this would cause increased excoriation upon removal.   For now I would apply bacitracin ointment to the inside of the nares twice daily and use nasal saline spray 2-3 times daily to keep the nose moist.  I try to avoid any indwelling nasal catheters and/or cannulas.  He may have a mild bloody discharge overnight given the excoriation noted. I will check the nasal cavity again tomorrow.  Please call with any severe active bleeding.

## 2019-04-03 NOTE — PROGRESS NOTES
Cardiothoracic Intensivist Progress Note       CARDIOTHORACIC   Post-Operative Day # 5     Subjective     History of Present Illness: Wesley Fields is a 58 y.o. cisgender male with history of decreased exercise capacity prior to OR s/p MVR, chronic HTN, and hyperlipidemia that are being treated.     Review of Systems:                                     Constitutional: no acute distress                                                  Eyes: denies difficulty with vision  Ears, nose, mouth, throat, and face: denies oral discomfort                                        Respiratory: denies shortness of breath                                  Cardiovascular: mild chest discomfort at RVAD site                                 Gastrointestinal: denies abdominal pain                                    Genitourinary: denies difficulty voiding                                      Hematologic: denies bleeding issues                                Musculoskeletal: denies muscle pain                                      Neurological: generalized weakness                                   Integumentary: denies rash    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    Social History:   Social History     Social History   • Marital status:      Spouse name: N/A   • Number of children: 4   • Years of  education: N/A     Social History Main Topics   • Smoking status: Never Smoker   • Smokeless tobacco: Never Used   • Alcohol use Yes      Comment: 2-3 beers/day   • Drug use: No   • Sexual activity: Not Asked     Other Topics Concern   • None     Social History Narrative    Patient lives with his wife in a 2 story home-bathroom on 2nd floor       Family History:   Family History   Problem Relation Age of Onset   • COPD Mother    • Multiple myeloma Mother    • Lung cancer Father    • Cancer Sister         thyroid       Objective     Vital signs in last 24 hours:  Temp:  [36 °C (96.8 °F)-36.8 °C (98.3 °F)] 36 °C (96.8 °F)  Heart Rate:  [58-70] 68  Resp:  [18-22] 18  BP: (148-170)/(55-70) 167/59      Intake/Output Summary (Last 24 hours) at 04/03/19 1038  Last data filed at 04/03/19 1031   Gross per 24 hour   Intake          1116.68 ml   Output             3720 ml   Net         -2603.32 ml     Intake/Output this shift:  I/O this shift:  In: 57 [I.V.:57]  Out: 1550 [Urine:1500; Chest Tube:50]    Labs  Lab Results   Component Value Date    WBC 8.94 04/03/2019    HGB 9.7 (L) 04/03/2019    HCT 30.6 (L) 04/03/2019    PLT 92 (L) 04/03/2019    ALT 49 04/03/2019    AST 92 (H) 04/03/2019     04/03/2019    K 4.4 04/03/2019     04/03/2019    CREATININE 1.0 04/03/2019    BUN 22 (H) 04/03/2019    CO2 21 (L) 04/03/2019    MG 2.3 04/03/2019    PHOS 3.6 04/01/2019    INR 1.2 04/01/2019    HGBA1C 5.6 03/29/2019    PT 14.2 04/01/2019    PTT 45 (H) 04/03/2019       Full Code    Head/Ear/Nose/Throat:     NCAT.                               Eyes:     PERRL.                     Respiratory:     diminished at the bases b/l.               Cardiovascular:     1st degree AV block, mechanical heart sounds.              Gastrointestinal:     + bowel sounds.                 Genitourinary:     No deformities.                    Extremities:     trace edema b/l lower extremities.            Musculoskeletal:      No injury or deformity.                    Neurological:     Follows commands.       Behavior/Emotional:     Cooperative.                           Lymph:      No adenopathy noted.                               Skin:      Clean, dry and intact.     Examination of the patient performed at the bedside. Rounded with ICU team and discussed management/plan with surgical staff. Medications, radiological studies and labs reviewed and discussed with attending of record, Dr. Georgi Pepe.    Cardiothoracic Assessment and Plan:    Neurologic: A&Ox3 and pain controlled. History of metastatic lung cancer to brain which has since been treated. Has been having some trouble sleeping and is upset about overall condition; Ambien for sleep and will consider anti-depression medication PRN.     Cardiovascular: Severe MR s/p MV repair now in cardiogenic shock with right heart failure, also with chronic diastolic CHF with a preserved EF and hypertension. RVAD placed intra-op due to worsening RV function. Unclear reason for acute decompensation as no CAD apparent and coronaries appeared to be de-aired thoroughly coming off bypass. Could have been  from pinching of coronary arteries at some point during manipulation of valve. Had some ST elevations but have since resolved. RVAD oxygenator has been capped however will not be explanted until TTE shows improvement of RV function. If RV function does not show improvement, will potentially need longer RV support albeit without oxygenator. Will resuscitate, monitor MVO2/CO, wean vasoactive medication and continue to optimize cardiac medications. Appreciate Cardiology input. Continue statin for dyslipidemia.     Respiratory: I personally reviewed the most recent CXR which portrayed b/l pulmonary congestion and small lung volumes relatively unchanged compared to previous exam. Has been net negative and could be component of atelectasis. Will encourage IS, mobilization and wean O2 PRN.      Genitourinary: No gambino.  Slightly elevated creatinine which is at baseline and with adequate GFR. Making adequate urine output. Will avoid nephrotoxic medications. Goal for net negative fluid balance.     Endocrine: Follow FSBG.     Hematologic: No evidence active bleeding. On a heparin gtt. Acute (expected) blood loss anemia from surgery. Follow plasma free Hbg to monitor for hemolysis. Secondary thrombocytopenia.     Infectious Disease: No indication for antibiotics at this time. Metastatic CA appears to be regressing by latest CT scan.     Fluids, Electrolytes: Electrolytes replaced per protocol. Goal for net negative fluid balance.     Gastrointestinal: PO as tolerated. GI ppx. LFTs have trended down.     Skin: OOB, PT.      Tubes, lines and drains: Will remove superfluous invasive monitors PRN.    Disposition: Critically ill.  Right heart failure with RVAD support s/p major invasive cardiac surgery. Continue ICU care.     I personally spent 100 minutes of critical care time with this patient not including procedure time.          Antonio Lafleur, DO  4/3/2019

## 2019-04-03 NOTE — PROGRESS NOTES
ECMO Daily Assessment and Management Procedure    Type: ProtekDuo Right ventricular assist device    Indication: Cardiogenic Shock-Right ventricle    Drainage Cannula:Right Subclavian Vein 31Fr    Return Cannula:same as drainage(double lumen) cannula 31Fr    :capped    Pre-pressure 227 , , delta pressure 27  Circuit flow 4 L/min at 3527 RPM    Circuit fully inspected from drainage to return cannula. Fibrin visible in oxygenator - post membrane.   Thrombus visible in oxygenator - pre membrane. There are no tubing kinks. The age of the circuit lines is 3 days.            Cardiology Daily Progress Note    Subjective   Wesley Fields is a 58 y.o. male who was admitted for Mitral valve insufficiency, unspecified etiology [I34.0]  Mitral regurgitation [I34.0].    Interval History: Extubated Awake alert      Objective     Vital signs in last 24 hours:  Temp:  [36.8 °C (98.3 °F)] 36.8 °C (98.3 °F)  Heart Rate:  [49-70] 70  Resp:  [18-22] 22  BP: (150-170)/(55-70) 170/55      Intake/Output Summary (Last 24 hours) at 04/02/19 2146  Last data filed at 04/02/19 2100   Gross per 24 hour   Intake          1570.26 ml   Output             5510 ml   Net         -3939.74 ml     Intake/Output this shift:  I/O this shift:  In: 257 [P.O.:200; I.V.:57]  Out: 610 [Urine:550; Chest Tube:60]    Labs  Lab Results   Component Value Date    WBC 9.71 04/02/2019    HGB 9.8 (L) 04/02/2019    HCT 30.3 (L) 04/02/2019    PLT 89 (L) 04/02/2019    ALT 50 04/02/2019     (H) 04/02/2019     04/02/2019    K 4.3 04/02/2019     04/02/2019    CREATININE 1.2 04/02/2019    BUN 22 (H) 04/02/2019    CO2 19 (L) 04/02/2019    INR 1.2 04/01/2019    HGBA1C 5.6 03/29/2019     Troponin I Results     No lab values to display.          Imaging  I have independently reviewed the pertinent imaging from the last 24 hrs.    ECG   sinus rhythm    Telemetry  sinus rhythm      Physical Exam:  General Appearance:  Alert, no distress extubated   Head:   Normocephalic, without obvious abnormality, atraumatic   Eyes:  Conjunctiva/corneas clear, EOM's intact   Neck: No thyroid enlargement. No JVD. No bruits    Lungs:   Decreased breath sounds at the bases   Heart:  Regular rhythm,Soft systolic murmur   Abdomen:   Soft, non-tender, no masses, no organomegaly   Vascular: Pulses 2+ and symmetric all extremities, no carotid bruit or jugular vein distention   Musculoskeletal:  Skin: No injury or deformity  Skin color, texture, turgor normal, no rashes or lesions, no cyanosis    Extremities: No edema   Behavior/Emotional: Appropriate, cooperative   Neurologic:                          sedated follow commands      Assessment/Plan   No new Assessment & Plan notes have been filed under this hospital service since the last note was generated.  Service: Cardiac, Interventional    Plan  Will look to transition to rvd  Echo wean I was bedside  35 cct mins  Needs more time  rv afterload rx given              cct 40 mins           Juan M Victoria,   4/2/2019

## 2019-04-03 NOTE — PROGRESS NOTES
Pulmonary Progress Note       SUBJECTIVE   Patient seen and examined at bedside this morning. No acute events overnight. Had small amount of epistaxis this morning. Oxygen requirements decreasing.  He remains on Protek Duo. SpO2 93-94% on room air.    OBJECTIVE        Vital Signs:   BP (!) 167/59   Pulse 64   Temp (!) 36 °C (96.8 °F) (Temporal)   Resp 18   Ht 1.829 m (6')   Wt 113 kg (249 lb)   SpO2 94%   BMI 33.77 kg/m²     I/O's:    Intake/Output Summary (Last 24 hours) at 04/03/19 1235  Last data filed at 04/03/19 1100   Gross per 24 hour   Intake          1051.72 ml   Output             3270 ml   Net         -2218.28 ml       Medications:    Reviewed. Pertinent medications as below.      acetaminophen 975 mg oral q6h RIGOBERTO   acetylcysteine 3 mL nebulization BID (6a, 6p)   amiodarone 400 mg oral BID   aspirin 81 mg oral Daily   chlorhexidine 15 mL Mouth/Throat 2 times per day   colchicine 0.6 mg oral Daily   docusate sodium 100 mg oral BID   famotidine 20 mg oral BID   folic acid 1 mg oral Daily   furosemide 40 mg intravenous BID (9a, 4p)   guaiFENesin 1,200 mg oral BID   insulin aspart U-100 3 Units subcutaneous TID with meals   ipratropium-albuterol 3 mL nebulization q6h RIGOBERTO   lorlatinib 75 mg oral Daily   magnesium oxide 400 mg oral BID   multivitamin 1 tablet oral Daily   potassium chloride 20 mEq oral BID   rosuvastatin 10 mg oral Daily (6p)   senna 1 tablet oral BID   sildenafil (antihypertensive) 20 mg oral TID   sodium chloride 10 mL intravenous q8h INT   thiamine 100 mg oral Daily           PHYSICAL EXAMINATION        General: The patient is in no acute distress.  Resting comfortably in bed.  HEENT: Mucous membranes are moist.  Sclera are anicteric.  Neck: Supple.  No cervical lymphadenopathy  Cardiovascular: S1 and S2 are present.  There are no murmurs.  Lungs: mild rhonchi  Abdomen: Soft, nontender and nondistended  Extremities: Cool and dry without edema  Neuro: Awake and alert.  Spontaneously  moving all extremities       Diagnostic Data      Labs:    I have personally reviewed all pertinent patient laboratory results. Labs of note discussed below:         ABG Results       04/03/19 04/03/19 04/03/19                    1219 0853 0518         pH 7.46 (H) 7.44 7.40         pCO2 32 (L) 34 (L) 38         pO2 74 (L) 90 131 (H)         HCO3 23 23 24         Base Excess -0.6 -0.7 -1.1                     CMP Results       04/03/19 04/02/19 04/02/19                    0518 1511 0608          - 138         K 4.4 4.3 4.4         Cl 109 - 107         CO2 21 (L) - 19 (L)         Glucose 109 (H) - 122 (H)         BUN 22 (H) - 22 (H)         Creatinine 1.0 - 1.2         Calcium 8.4 (L) - 8.1 (L)         Anion Gap 8 - 12         AST 92 (H) - 112 (H)         ALT 49 - 50         Albumin 2.5 (L) - 2.7 (L)         EGFR &gt;60.0 - &gt;60.0                     CBC Results       04/03/19 04/02/19 04/02/19                    0518 2314 1813         WBC 8.94 9.64 9.71         RBC 3.38 (L) 3.31 (L) 3.39 (L)         HGB 9.7 (L) 9.6 (L) 9.8 (L)         HCT 30.6 (L) 29.5 (L) 30.3 (L)         MCV 90.5 89.1 89.4         MCH 28.7 29.0 28.9         MCHC 31.7 (L) 32.5 32.3         PLT 92 (L) 88 (L) 89 (L)         Comment for WBC at 1813 on 04/02/19:  ALL RESULTS HAVE BEEN CHECKED    Comment for PLT at 0518 on 04/03/19:  CONSISTENT WITH PREVIOUS RESULTS    Comment for PLT at 2314 on 04/02/19:  CONSISTENT WITH PREVIOUS RESULTS    Comment for PLT at 1813 on 04/02/19:  CONSISTENT WITH PREVIOUS RESULTS. SPECIMEN CHECKED. PATIENT RECEIVED PLTS                Imaging:    I have reviewed all pertinent imaging which is discussed below.    CXR 4/3/2019: no significant change    ASSESSMENT AND PLAN      58 year old male with history of Stage IV NSCLC on lorlatinib presented for MVR complicated by RV failure. Now with continued hypoxia post extubation.      Impression  Acute Hypoxic Respiratory Failure - hypoxia improving. Hypoxia is likely  multifactorial. CXR demonstrated significant bilateral atelectasis as well as evidence of fluid overload. Additionally, there was PFO noted on SAURABH intraoperatively with right to left shunt in the setting of RV failure.  Stage IV NSCLC      Recommendations:  -Would continue albuterol q6h and Mucomyst q12h  -Diuresis as per primary team  -Continue to encourage incentive spirometer  -Supplemental oxygen as necessary  -Management of right heart failure as per primary and cardiology teams       Berhane Ortiz MD  PGY-4  Pulmonary/Critical Care Fellow  4/3/2019

## 2019-04-04 ENCOUNTER — APPOINTMENT (INPATIENT)
Dept: CARDIOLOGY | Facility: HOSPITAL | Age: 59
DRG: 003 | End: 2019-04-04
Attending: CLINICAL NURSE SPECIALIST
Payer: COMMERCIAL

## 2019-04-04 ENCOUNTER — APPOINTMENT (INPATIENT)
Dept: RADIOLOGY | Facility: HOSPITAL | Age: 59
DRG: 003 | End: 2019-04-04
Attending: PHYSICIAN ASSISTANT
Payer: COMMERCIAL

## 2019-04-04 LAB
ALBUMIN SERPL-MCNC: 2.5 G/DL (ref 3.4–5)
ALP SERPL-CCNC: 56 IU/L (ref 35–126)
ALT SERPL-CCNC: 44 IU/L (ref 16–63)
ANION GAP SERPL CALC-SCNC: 10 MEQ/L (ref 3–15)
APTT PPP: 43 SEC (ref 23–35)
APTT PPP: 44 SEC (ref 23–35)
APTT PPP: 52 SEC (ref 23–35)
APTT PPP: 54 SEC (ref 23–35)
AST SERPL-CCNC: 65 IU/L (ref 15–41)
BASE EXCESS BLDA CALC-SCNC: -0.6 MEQ/L
BASE EXCESS BLDA CALC-SCNC: 0.2 MEQ/L
BASE EXCESS BLDA CALC-SCNC: 0.4 MEQ/L
BASE EXCESS BLDA CALC-SCNC: 1.9 MEQ/L
BILIRUB SERPL-MCNC: 0.8 MG/DL (ref 0.3–1.2)
BSA FOR ECHO PROCEDURE: 2.4 M2
BUN SERPL-MCNC: 20 MG/DL (ref 8–20)
CA-I BLD-SCNC: 1.15 MMOL/L (ref 1.15–1.27)
CA-I BLD-SCNC: 1.15 MMOL/L (ref 1.15–1.27)
CA-I BLD-SCNC: 1.18 MMOL/L (ref 1.15–1.27)
CA-I BLD-SCNC: 1.2 MMOL/L (ref 1.15–1.27)
CALCIUM SERPL-MCNC: 8.6 MG/DL (ref 8.9–10.3)
CHLORIDE SERPL-SCNC: 108 MEQ/L (ref 98–109)
CO2 BLDA-SCNC: 24 MEQ/L (ref 22–32)
CO2 BLDA-SCNC: 24 MEQ/L (ref 22–32)
CO2 BLDA-SCNC: 26 MEQ/L (ref 22–32)
CO2 BLDA-SCNC: 27 MEQ/L (ref 22–32)
CO2 SERPL-SCNC: 21 MEQ/L (ref 22–32)
CREAT SERPL-MCNC: 1.1 MG/DL
EDV (BP): 96.2 ML
EF (A4C): 57.9 %
EF A2C: 45.7 %
EJECTION FRACTION: 51.5 %
ERYTHROCYTE [DISTWIDTH] IN BLOOD BY AUTOMATED COUNT: 14.9 % (ref 11.6–14.4)
ERYTHROCYTE [DISTWIDTH] IN BLOOD BY AUTOMATED COUNT: 14.9 % (ref 11.6–14.4)
ERYTHROCYTE [DISTWIDTH] IN BLOOD BY AUTOMATED COUNT: 15 % (ref 11.6–14.4)
ERYTHROCYTE [DISTWIDTH] IN BLOOD BY AUTOMATED COUNT: 15 % (ref 11.6–14.4)
ESV (BP): 46.6 ML
FIBRINOGEN PPP-MCNC: 181 MG/DL (ref 220–480)
FIBRINOGEN PPP-MCNC: 276 MG/DL (ref 220–480)
FIBRINOGEN PPP-MCNC: 309 MG/DL (ref 220–480)
FIBRINOGEN PPP-MCNC: 353 MG/DL (ref 220–480)
GFR SERPL CREATININE-BSD FRML MDRD: >60 ML/MIN/1.73M*2
GLUCOSE BLD-MCNC: 121 MG/DL (ref 70–99)
GLUCOSE BLDA-MCNC: 108 MG/DL (ref 65–95)
GLUCOSE BLDA-MCNC: 111 MG/DL (ref 65–95)
GLUCOSE BLDA-MCNC: 119 MG/DL (ref 65–95)
GLUCOSE BLDA-MCNC: 134 MG/DL (ref 65–95)
GLUCOSE SERPL-MCNC: 114 MG/DL (ref 70–99)
HCO3 BLDA-SCNC: 23 MEQ/L (ref 21–28)
HCO3 BLDA-SCNC: 23 MEQ/L (ref 21–28)
HCO3 BLDA-SCNC: 25 MEQ/L (ref 21–28)
HCO3 BLDA-SCNC: 26 MEQ/L (ref 21–28)
HCT VFR BLDA CALC: 30 % (ref 36–48)
HCT VFR BLDA CALC: 30 % (ref 36–48)
HCT VFR BLDA CALC: 31 % (ref 36–48)
HCT VFR BLDA CALC: 36 % (ref 36–48)
HCT VFR BLDCO AUTO: 31 %
HCT VFR BLDCO AUTO: 31.1 %
HCT VFR BLDCO AUTO: 33.2 %
HCT VFR BLDCO AUTO: 33.6 %
HGB BLD-MCNC: 10.1 G/DL
HGB BLD-MCNC: 10.3 G/DL
HGB BLD-MCNC: 10.9 G/DL
HGB BLD-MCNC: 11 G/DL
HGB FREE PLAS-MCNC: <30 MG/DL
LACTATE BLDA-SCNC: 0.5 MMOL/L (ref 0.4–1.6)
LACTATE BLDA-SCNC: 0.6 MMOL/L (ref 0.4–1.6)
LEFT VENTRICLE DIASTOLIC VOLUME INDEX: 43.75 ML/M2
LEFT VENTRICLE DIASTOLIC VOLUME: 105 ML
LEFT VENTRICLE SYSTOLIC VOLUME INDEX: 18.42 ML/M2
LEFT VENTRICLE SYSTOLIC VOLUME: 44.2 ML
LV DIASTOLIC VOLUME: 81 ML
LV ESV (APICAL 2 CHAMBER): 44 ML
LVEDVI(A2C): 33.75 ML/M2
LVEDVI(BP): 40.08 ML/M2
LVESVI(A2C): 18.33 ML/M2
LVESVI(BP): 19.42 ML/M2
MAGNESIUM SERPL-MCNC: 2.2 MG/DL (ref 1.8–2.5)
MAGNESIUM SERPL-MCNC: 2.2 MG/DL (ref 1.8–2.5)
MAGNESIUM SERPL-MCNC: 2.3 MG/DL (ref 1.8–2.5)
MCH RBC QN AUTO: 28.4 PG (ref 28–33.2)
MCH RBC QN AUTO: 28.9 PG (ref 28–33.2)
MCHC RBC AUTO-ENTMCNC: 32.4 G/DL (ref 32.2–36.5)
MCHC RBC AUTO-ENTMCNC: 32.6 G/DL (ref 32.2–36.5)
MCHC RBC AUTO-ENTMCNC: 33.1 G/DL (ref 32.2–36.5)
MCHC RBC AUTO-ENTMCNC: 33.1 G/DL (ref 32.2–36.5)
MCV RBC AUTO: 87.1 FL (ref 83–98)
MCV RBC AUTO: 87.4 FL (ref 83–98)
MCV RBC AUTO: 87.5 FL (ref 83–98)
MCV RBC AUTO: 88.6 FL (ref 83–98)
PCO2 BLDA: 30 MM HG (ref 35–48)
PCO2 BLDA: 31 MM HG (ref 35–48)
PCO2 BLDA: 35 MM HG (ref 35–48)
PCO2 BLDA: 37 MM HG (ref 35–48)
PDW BLD AUTO: 10.5 FL (ref 9.4–12.4)
PDW BLD AUTO: 10.7 FL (ref 9.4–12.4)
PDW BLD AUTO: 11.2 FL (ref 9.4–12.4)
PDW BLD AUTO: 11.3 FL (ref 9.4–12.4)
PH BLDA: 7.43 PH (ref 7.35–7.45)
PH BLDA: 7.47 PH (ref 7.35–7.45)
PH BLDA: 7.47 PH (ref 7.35–7.45)
PH BLDA: 7.49 PH (ref 7.35–7.45)
PHOSPHATE SERPL-MCNC: 4 MG/DL (ref 2.4–4.7)
PLATELET # BLD AUTO: 64 K/UL
PLATELET # BLD AUTO: 80 K/UL
PLATELET # BLD AUTO: 81 K/UL
PLATELET # BLD AUTO: 88 K/UL
PO2 BLDA: 71 MM HG (ref 83–100)
PO2 BLDA: 73 MM HG (ref 83–100)
PO2 BLDA: 77 MM HG (ref 83–100)
PO2 BLDA: 77 MM HG (ref 83–100)
POCT PATIENT TEMPERATURE: 98.6 °F (ref 97–99)
POCT TEST (BLD GAS): ABNORMAL
POCT TEST: ABNORMAL
POTASSIUM BLDA-SCNC: 3.9 MEQ/L (ref 3.4–4.5)
POTASSIUM BLDA-SCNC: 4 MEQ/L (ref 3.4–4.5)
POTASSIUM BLDA-SCNC: 4.1 MEQ/L (ref 3.4–4.5)
POTASSIUM BLDA-SCNC: 4.3 MEQ/L (ref 3.4–4.5)
POTASSIUM SERPL-SCNC: 4 MEQ/L (ref 3.6–5.1)
POTASSIUM SERPL-SCNC: 4 MEQ/L (ref 3.6–5.1)
POTASSIUM SERPL-SCNC: 4.3 MEQ/L (ref 3.6–5.1)
PROT SERPL-MCNC: 4.7 G/DL (ref 6–8.2)
RBC # BLD AUTO: 3.5 M/UL (ref 4.5–5.8)
RBC # BLD AUTO: 3.56 M/UL (ref 4.5–5.8)
RBC # BLD AUTO: 3.81 M/UL (ref 4.5–5.8)
RBC # BLD AUTO: 3.84 M/UL (ref 4.5–5.8)
SAO2 % BLDA: 95 % (ref 93–98)
SAO2 % BLDA: 95 % (ref 93–98)
SAO2 % BLDA: 96 % (ref 93–98)
SAO2 % BLDA: 96 % (ref 93–98)
SAO2 % BLDV: 58.9 % (ref 30–60)
SAO2 % BLDV: 61.4 % (ref 30–60)
SAO2 % BLDV: 64.3 % (ref 30–60)
SODIUM BLDA-SCNC: 135 MEQ/L (ref 136–145)
SODIUM BLDA-SCNC: 136 MEQ/L (ref 136–145)
SODIUM BLDA-SCNC: 137 MEQ/L (ref 136–145)
SODIUM BLDA-SCNC: 137 MEQ/L (ref 136–145)
SODIUM SERPL-SCNC: 139 MEQ/L (ref 136–144)
TR MAX PG: 14.4 MMHG
TRICUSPID VALVE PEAK REGURGITATION VELOCITY: 1.9 M/S
WBC # BLD AUTO: 10.78 K/UL
WBC # BLD AUTO: 11.08 K/UL
WBC # BLD AUTO: 12.49 K/UL
WBC # BLD AUTO: 13.7 K/UL

## 2019-04-04 PROCEDURE — 97116 GAIT TRAINING THERAPY: CPT | Mod: GP

## 2019-04-04 PROCEDURE — 63700000 HC SELF-ADMINISTRABLE DRUG: Performed by: PHYSICIAN ASSISTANT

## 2019-04-04 PROCEDURE — 93308 TTE F-UP OR LMTD: CPT | Mod: 26 | Performed by: INTERNAL MEDICINE

## 2019-04-04 PROCEDURE — 33947 ECMO/ECLS INITIATION ARTERY: CPT | Performed by: INTERNAL MEDICINE

## 2019-04-04 PROCEDURE — 48000013 HC VENTRICULAR ASSIST, ECMO HOURLY

## 2019-04-04 PROCEDURE — 63600000 HC DRUGS/DETAIL CODE: Performed by: NURSE PRACTITIONER

## 2019-04-04 PROCEDURE — 85730 THROMBOPLASTIN TIME PARTIAL: CPT | Performed by: NURSE PRACTITIONER

## 2019-04-04 PROCEDURE — 99291 CRITICAL CARE FIRST HOUR: CPT | Mod: 25 | Performed by: ANESTHESIOLOGY

## 2019-04-04 PROCEDURE — 84132 ASSAY OF SERUM POTASSIUM: CPT | Performed by: PHYSICIAN ASSISTANT

## 2019-04-04 PROCEDURE — 83051 HEMOGLOBIN PLASMA: CPT | Performed by: CLINICAL NURSE SPECIALIST

## 2019-04-04 PROCEDURE — 93321 DOPPLER ECHO F-UP/LMTD STD: CPT | Mod: 26 | Performed by: INTERNAL MEDICINE

## 2019-04-04 PROCEDURE — 83735 ASSAY OF MAGNESIUM: CPT | Performed by: PHYSICIAN ASSISTANT

## 2019-04-04 PROCEDURE — 99233 SBSQ HOSP IP/OBS HIGH 50: CPT | Performed by: INTERNAL MEDICINE

## 2019-04-04 PROCEDURE — 93325 DOPPLER ECHO COLOR FLOW MAPG: CPT | Mod: 26 | Performed by: INTERNAL MEDICINE

## 2019-04-04 PROCEDURE — 63700000 HC SELF-ADMINISTRABLE DRUG: Performed by: NURSE PRACTITIONER

## 2019-04-04 PROCEDURE — 25000000 HC PHARMACY GENERAL: Performed by: PHYSICIAN ASSISTANT

## 2019-04-04 PROCEDURE — 82810 BLOOD GASES O2 SAT ONLY: CPT | Performed by: THORACIC SURGERY (CARDIOTHORACIC VASCULAR SURGERY)

## 2019-04-04 PROCEDURE — 20000000 HC ROOM AND CARE ICU

## 2019-04-04 PROCEDURE — 85027 COMPLETE CBC AUTOMATED: CPT | Performed by: THORACIC SURGERY (CARDIOTHORACIC VASCULAR SURGERY)

## 2019-04-04 PROCEDURE — 85384 FIBRINOGEN ACTIVITY: CPT | Performed by: CLINICAL NURSE SPECIALIST

## 2019-04-04 PROCEDURE — 63600000 HC DRUGS/DETAIL CODE: Performed by: PHYSICIAN ASSISTANT

## 2019-04-04 PROCEDURE — 83735 ASSAY OF MAGNESIUM: CPT | Performed by: THORACIC SURGERY (CARDIOTHORACIC VASCULAR SURGERY)

## 2019-04-04 PROCEDURE — 85027 COMPLETE CBC AUTOMATED: CPT | Performed by: CLINICAL NURSE SPECIALIST

## 2019-04-04 PROCEDURE — 99232 SBSQ HOSP IP/OBS MODERATE 35: CPT | Mod: 25 | Performed by: INTERNAL MEDICINE

## 2019-04-04 PROCEDURE — 94640 AIRWAY INHALATION TREATMENT: CPT

## 2019-04-04 PROCEDURE — 82810 BLOOD GASES O2 SAT ONLY: CPT | Performed by: CLINICAL NURSE SPECIALIST

## 2019-04-04 PROCEDURE — P9035 PLATELET PHERES LEUKOREDUCED: HCPCS

## 2019-04-04 PROCEDURE — 85730 THROMBOPLASTIN TIME PARTIAL: CPT | Performed by: THORACIC SURGERY (CARDIOTHORACIC VASCULAR SURGERY)

## 2019-04-04 PROCEDURE — 93320 DOPPLER ECHO COMPLETE: CPT

## 2019-04-04 PROCEDURE — 84100 ASSAY OF PHOSPHORUS: CPT | Performed by: ANESTHESIOLOGY

## 2019-04-04 PROCEDURE — 84132 ASSAY OF SERUM POTASSIUM: CPT | Performed by: THORACIC SURGERY (CARDIOTHORACIC VASCULAR SURGERY)

## 2019-04-04 PROCEDURE — 84132 ASSAY OF SERUM POTASSIUM: CPT | Performed by: CLINICAL NURSE SPECIALIST

## 2019-04-04 PROCEDURE — 36415 COLL VENOUS BLD VENIPUNCTURE: CPT | Performed by: CLINICAL NURSE SPECIALIST

## 2019-04-04 PROCEDURE — 83735 ASSAY OF MAGNESIUM: CPT | Performed by: CLINICAL NURSE SPECIALIST

## 2019-04-04 PROCEDURE — 71045 X-RAY EXAM CHEST 1 VIEW: CPT

## 2019-04-04 PROCEDURE — 80053 COMPREHEN METABOLIC PANEL: CPT | Performed by: CLINICAL NURSE SPECIALIST

## 2019-04-04 PROCEDURE — 25800000 HC PHARMACY IV SOLUTIONS: Performed by: PHYSICIAN ASSISTANT

## 2019-04-04 RX ORDER — AMIODARONE HYDROCHLORIDE 200 MG/1
400 TABLET ORAL 2 TIMES DAILY
Status: DISCONTINUED | OUTPATIENT
Start: 2019-04-04 | End: 2019-04-11 | Stop reason: HOSPADM

## 2019-04-04 RX ORDER — IBUPROFEN/PSEUDOEPHEDRINE HCL 200MG-30MG
6 TABLET ORAL NIGHTLY PRN
Status: DISCONTINUED | OUTPATIENT
Start: 2019-04-04 | End: 2019-04-11 | Stop reason: HOSPADM

## 2019-04-04 RX ORDER — HEPARIN SODIUM 10000 [USP'U]/100ML
2000 INJECTION, SOLUTION INTRAVENOUS
Status: DISCONTINUED | OUTPATIENT
Start: 2019-04-04 | End: 2019-04-09

## 2019-04-04 RX ADMIN — POTASSIUM CHLORIDE 20 MEQ: 750 TABLET, FILM COATED, EXTENDED RELEASE ORAL at 08:19

## 2019-04-04 RX ADMIN — COLCHICINE 0.6 MG: 0.6 TABLET, FILM COATED ORAL at 08:20

## 2019-04-04 RX ADMIN — GUAIFENESIN 1200 MG: 600 TABLET, EXTENDED RELEASE ORAL at 20:37

## 2019-04-04 RX ADMIN — POTASSIUM CHLORIDE 20 MEQ: 200 INJECTION, SOLUTION INTRAVENOUS at 01:15

## 2019-04-04 RX ADMIN — IPRATROPIUM BROMIDE AND ALBUTEROL SULFATE 3 ML: 2.5; .5 SOLUTION RESPIRATORY (INHALATION) at 20:31

## 2019-04-04 RX ADMIN — INSULIN ASPART 3 UNITS: 100 INJECTION, SOLUTION INTRAVENOUS; SUBCUTANEOUS at 08:19

## 2019-04-04 RX ADMIN — SILDENAFIL 20 MG: 20 TABLET ORAL at 08:20

## 2019-04-04 RX ADMIN — POTASSIUM CHLORIDE 20 MEQ: 200 INJECTION, SOLUTION INTRAVENOUS at 20:30

## 2019-04-04 RX ADMIN — ACETAMINOPHEN 975 MG: 325 TABLET ORAL at 18:12

## 2019-04-04 RX ADMIN — MAGNESIUM GLUCONATE 500 MG ORAL TABLET 400 MG: 500 TABLET ORAL at 20:37

## 2019-04-04 RX ADMIN — MUPIROCIN 1 APPLICATION.: 20 OINTMENT TOPICAL at 20:38

## 2019-04-04 RX ADMIN — AMIODARONE HYDROCHLORIDE 400 MG: 200 TABLET ORAL at 08:19

## 2019-04-04 RX ADMIN — POTASSIUM CHLORIDE 20 MEQ: 750 TABLET, FILM COATED, EXTENDED RELEASE ORAL at 20:37

## 2019-04-04 RX ADMIN — CHLORHEXIDINE GLUCONATE 15 ML: 1.2 RINSE ORAL at 10:07

## 2019-04-04 RX ADMIN — Medication 10 ML: at 17:08

## 2019-04-04 RX ADMIN — SILDENAFIL 20 MG: 20 TABLET ORAL at 14:56

## 2019-04-04 RX ADMIN — IPRATROPIUM BROMIDE AND ALBUTEROL SULFATE 3 ML: 2.5; .5 SOLUTION RESPIRATORY (INHALATION) at 09:01

## 2019-04-04 RX ADMIN — INSULIN ASPART 3 UNITS: 100 INJECTION, SOLUTION INTRAVENOUS; SUBCUTANEOUS at 12:02

## 2019-04-04 RX ADMIN — FAMOTIDINE 20 MG: 20 TABLET, FILM COATED ORAL at 20:37

## 2019-04-04 RX ADMIN — ASPIRIN 81 MG: 81 TABLET, COATED ORAL at 08:20

## 2019-04-04 RX ADMIN — CALCIUM CHLORIDE 1 G: 100 INJECTION, SOLUTION INTRAVENOUS at 01:14

## 2019-04-04 RX ADMIN — MUPIROCIN 1 APPLICATION.: 20 OINTMENT TOPICAL at 08:20

## 2019-04-04 RX ADMIN — FUROSEMIDE 40 MG: 10 INJECTION, SOLUTION INTRAMUSCULAR; INTRAVENOUS at 08:20

## 2019-04-04 RX ADMIN — Medication 100 MG: at 08:20

## 2019-04-04 RX ADMIN — ACETYLCYSTEINE 600 MG: 200 INHALANT RESPIRATORY (INHALATION) at 20:31

## 2019-04-04 RX ADMIN — POTASSIUM CHLORIDE 20 MEQ: 200 INJECTION, SOLUTION INTRAVENOUS at 13:00

## 2019-04-04 RX ADMIN — FUROSEMIDE 40 MG: 10 INJECTION, SOLUTION INTRAMUSCULAR; INTRAVENOUS at 16:29

## 2019-04-04 RX ADMIN — SALINE NASAL SPRAY 2 SPRAY: 1.5 SOLUTION NASAL at 08:21

## 2019-04-04 RX ADMIN — MUPIROCIN 1 APPLICATION.: 20 OINTMENT TOPICAL at 14:56

## 2019-04-04 RX ADMIN — SALINE NASAL SPRAY 2 SPRAY: 1.5 SOLUTION NASAL at 14:56

## 2019-04-04 RX ADMIN — Medication 10 ML: at 01:14

## 2019-04-04 RX ADMIN — SALINE NASAL SPRAY 2 SPRAY: 1.5 SOLUTION NASAL at 21:00

## 2019-04-04 RX ADMIN — MAGNESIUM GLUCONATE 500 MG ORAL TABLET 400 MG: 500 TABLET ORAL at 08:19

## 2019-04-04 RX ADMIN — ACETAMINOPHEN 975 MG: 325 TABLET ORAL at 03:42

## 2019-04-04 RX ADMIN — ROSUVASTATIN CALCIUM 10 MG: 10 TABLET, FILM COATED ORAL at 18:10

## 2019-04-04 RX ADMIN — HEPARIN SODIUM 1400 UNITS/HR: 10000 INJECTION, SOLUTION INTRAVENOUS at 22:00

## 2019-04-04 RX ADMIN — Medication 6 MG: at 22:17

## 2019-04-04 RX ADMIN — Medication 10 ML: at 08:22

## 2019-04-04 RX ADMIN — HEPARIN SODIUM AND DEXTROSE 1400 UNITS/HR: 10000; 5 INJECTION INTRAVENOUS at 06:09

## 2019-04-04 RX ADMIN — ACETAMINOPHEN 975 MG: 325 TABLET ORAL at 12:09

## 2019-04-04 RX ADMIN — AMIODARONE HYDROCHLORIDE 400 MG: 200 TABLET ORAL at 20:37

## 2019-04-04 RX ADMIN — SILDENAFIL 20 MG: 20 TABLET ORAL at 20:37

## 2019-04-04 RX ADMIN — CHLORHEXIDINE GLUCONATE 15 ML: 1.2 RINSE ORAL at 21:00

## 2019-04-04 RX ADMIN — FAMOTIDINE 20 MG: 20 TABLET, FILM COATED ORAL at 08:19

## 2019-04-04 RX ADMIN — SALINE NASAL SPRAY 2 SPRAY: 1.5 SOLUTION NASAL at 18:13

## 2019-04-04 RX ADMIN — ACETYLCYSTEINE 800 MG: 200 INHALANT RESPIRATORY (INHALATION) at 09:01

## 2019-04-04 RX ADMIN — FOLIC ACID 1 MG: 1 TABLET ORAL at 08:20

## 2019-04-04 RX ADMIN — IPRATROPIUM BROMIDE AND ALBUTEROL SULFATE 3 ML: 2.5; .5 SOLUTION RESPIRATORY (INHALATION) at 15:31

## 2019-04-04 RX ADMIN — MULTIPLE VITAMINS W/ MINERALS TAB 1 TABLET: TAB at 08:20

## 2019-04-04 RX ADMIN — GUAIFENESIN 1200 MG: 600 TABLET, EXTENDED RELEASE ORAL at 08:20

## 2019-04-04 ASSESSMENT — COGNITIVE AND FUNCTIONAL STATUS - GENERAL
CLIMB 3 TO 5 STEPS WITH RAILING: 3 - A LITTLE
STANDING UP FROM CHAIR USING ARMS: 3 - A LITTLE
MOVING TO AND FROM BED TO CHAIR: 3 - A LITTLE
WALKING IN HOSPITAL ROOM: 3 - A LITTLE

## 2019-04-04 NOTE — PLAN OF CARE
Problem: Patient Care Overview  Goal: Plan of Care Review  Outcome: Ongoing (interventions implemented as appropriate)   04/04/19 1611   Coping/Psychosocial   Plan Of Care Reviewed With patient   Plan of Care Review   Progress progress toward functional goals as expected   Outcome Summary 1 person A       Problem: Cardiac Surgery (Adult)  Goal: Signs and Symptoms of Listed Potential Problems Will be Absent, Minimized or Managed (Cardiac Surgery)  Outcome: Ongoing (interventions implemented as appropriate)

## 2019-04-04 NOTE — PLAN OF CARE
Problem: Cardiac Surgery (Adult)  Goal: Signs and Symptoms of Listed Potential Problems Will be Absent, Minimized or Managed (Cardiac Surgery)  Outcome: Ongoing (interventions implemented as appropriate)   04/04/19 0027   Cardiac Surgery   Problems Assessed (Cardiac Surgery) pain;cardiac complications;functional deficit   Problems Present (Cardiac Surgery) functional deficit

## 2019-04-04 NOTE — PROGRESS NOTES
ECMO Daily Assessment and Management Procedure    Type: ProtekDuo Right ventricular assist device    Indication: Cardiogenic Shock-Right ventricle    Drainage Cannula:Right Subclavian Vein 31Fr    Return Cannula:same as drainage(double lumen) cannula 31Fr    :capped    Pre-pressure 227 , , delta pressure 27  Circuit flow 4 L/min at 3527 RPM    Circuit fully inspected from drainage to return cannula. Fibrin visible in oxygenator - post membrane.   Thrombus visible in oxygenator - pre membrane. There are no tubing kinks. The age of the circuit lines is 5 days.    Performed Circuit Change at bedside today for dropping platelts  Also did             Cardiology Daily Progress Note    Subjective   Wesley Fields is a 58 y.o. male who was admitted for Mitral valve insufficiency, unspecified etiology [I34.0]  Mitral regurgitation [I34.0].    Interval History: Extubated Awake alert      Objective     Vital signs in last 24 hours:  Temp:  [36.7 °C (98.1 °F)-38 °C (100.4 °F)] 38 °C (100.4 °F)  Heart Rate:  [64-80] 79  Resp:  [16-20] 16  BP: (144)/(69) 144/69      Intake/Output Summary (Last 24 hours) at 04/04/19 1957  Last data filed at 04/04/19 1900   Gross per 24 hour   Intake             1936 ml   Output             6900 ml   Net            -4964 ml     Intake/Output this shift:  I/O this shift:  In: 19 [I.V.:19]  Out: 1000 [Urine:1000]    Labs  Lab Results   Component Value Date    WBC 13.70 (H) 04/04/2019    HGB 10.9 (L) 04/04/2019    HCT 33.6 (L) 04/04/2019    PLT 64 (L) 04/04/2019    ALT 44 04/04/2019    AST 65 (H) 04/04/2019     04/04/2019    K 4.0 04/04/2019     04/04/2019    CREATININE 1.1 04/04/2019    BUN 20 04/04/2019    CO2 21 (L) 04/04/2019    INR 1.2 04/01/2019    HGBA1C 5.6 03/29/2019     Troponin I Results     No lab values to display.          Imaging  I have independently reviewed the pertinent imaging from the last 24 hrs.    ECG   sinus rhythm    Telemetry  sinus rhythm      Physical  Exam:  General Appearance:  Alert, no distress extubated   Head:  Normocephalic, without obvious abnormality, atraumatic   Eyes:  Conjunctiva/corneas clear, EOM's intact   Neck: No thyroid enlargement. No JVD. No bruits    Lungs:   Decreased breath sounds at the bases   Heart:  Regular rhythm,Soft systolic murmur   Abdomen:   Soft, non-tender, no masses, no organomegaly   Vascular: Pulses 2+ and symmetric all extremities, no carotid bruit or jugular vein distention   Musculoskeletal:  Skin: No injury or deformity  Skin color, texture, turgor normal, no rashes or lesions, no cyanosis    Extremities: No edema   Behavior/Emotional: Appropriate, cooperative   Neurologic:                          sedated follow commands      Assessment/Plan   No new Assessment & Plan notes have been filed under this hospital service since the last note was generated.  Service: Cardiac, Interventional  Bedside weaning today over several hours  Hemodyanmics support removal  Echo is more concerning  Tolerated 1.75 liters for a small amount of time but didn't want to leave flows that low  Likely will tolerate removal soon but tonight again at bedside for circuit change out  Will remove oxygenator from circuit and use rotoflow with truncated length    CCT 60 mins            cct  mins           Juan M Victoria,   4/4/2019

## 2019-04-04 NOTE — PROGRESS NOTES
Brief Nutrition Note    Recommendations   1. Will change diet to cardiac (2gm Na, low fat)   2. Will follow with diet education before d/c     Nutrition Charting Type: Nutrition Brief Assessment    Clinical Course: Patient is a 58 y.o. male who was admitted on 3/29/2019 with a diagnosis of Mitral valve insufficiency, unspecified etiology [I34.0]  Mitral regurgitation [I34.0].   3/29 Minimally invasive mitral valve repair w/. 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)    • Radiation therapy induced brain necrosis     last steroid 1/2019     Past Surgical History:   Procedure Laterality Date   • CARDIAC CATHETERIZATION     • CLAVICLE SURGERY Right     biopsy   • HERNIA REPAIR         General Information  Nutrition Evaluation/Patient Follow-Up: Mildly Nutritionally Compromised-Follow up in 4-6 days  Reason for Consult: Provider order  Patient Already Seen On: 03/30/19     Nutrition/Diet History  Patient Reported Diet: other (see comments) (no added salt)  Appetite Prior to Admission: Good-50-75%  Intake (%): 75%    Physical Findings  Additional Documentation: Physical Appearance (Group)     Physical Appearance  Overall Physical Appearance: obese, Other (comments) (Protek Duo)  Last Bowel Movement: 04/02/19  Skin: edema, surgical incision (2+ gen edema)     Nutrition Order Review  Nutrition Order Review: meets nutritional requirements  Nutrition Order Review Comments: Regular, NCS     Anthropometrics  Height: 182.9 cm (6')     Current Weight  Weight Method: Bed scale  Weight: 113 kg (249 lb)  Estimated Dry Weight: 104 kg (229 lb)     Ideal Body Weight  (IBW)  Ideal Body Weight (IBW) (kg): 82.07  % Ideal Body Weight: 137.62     Body Mass Index (BMI)  BMI (Calculated): 33.8  BMI (Calculated on Estimated Dry Weight): 31.1     Labs/Procedures/Meds  Additional Documentation: Lab Results (Group), Pertinent Medications (Group)     Lab Results  Lab Results Reviewed: reviewed, pertinent   BMP Results       19     0606 1811 1207     - -    K 4.3 4.1 4.6    Cl 108 - -    CO2 21 (L) - -    Glucose 114 (H) - -    BUN 20 - -    Creatinine 1.1 - -    Calcium 8.6 (L) - -    Anion Gap 10 - -   M.3  Phos: 4  Hb A1C (3/29): 5.6      Pertinent Medications  Pertinent Medications Reviewed: reviewed, pertinent     Pepcid, folic acid, lasix, SSI, mag-ox, MVI, lorlatinib, klor-con, crestor, senokot, thiamine, heparin gtt      Skin: 2+ gen edema, surgical incisions     Clinical comments: POD #6 minimally invasive mitral valve repair w/ insertion of protek cath. Continues with protek duo. NKFA. Interview with pt and wife. Pt reports good appetite and intake PTA. No wt changes per pt. Pt on lorlatinib, which increases blood sugars. No noted high blood sugars - will d/c NCS diet. Wife reports following a no added salt diet at home. Pt with good intake and appetite now, will change diet to cardiac (2gm Na, low fat). Will follow with diet education before d/c     Goals: meet >75% of nutritional needs via oral intake   Monitor: po intake, labs, volume status, skin     Recommendations: See above       Date: 19  Signature: Josie Gant

## 2019-04-04 NOTE — PROGRESS NOTES
Patient: Wesley Fields  Location: [address] [address]23  MRN: [id number]  Today's date: 4/4/2019  Post session, pt sitting in chair, w/call bell accessible. Nsg notified.      Hospital Course  Wesley STALLINGS is a 58 y.o. male admitted on 3/29/2019 with Mitral valve insufficiency, unspecified etiology [I34.0]  Mitral regurgitation [I34.0]. Principal problem is Mitral valve insufficiency.    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     Admit w/mitral insufficiency s/p MVR w/ dora cords, +thoractomy> hosp course complicated by hypoxemia, +PFO w/cardiogenic shock/R ventricular dysfunction> now on VV ECMO thru subclavian    4/3/19 Cardiology assessed pt: plan to remain on ECMO through this week.           Therapy Pain/Vitals     Row Name 04/04/19 0941          Pain/Comfort/Sleep    Presence of Pain complains of pain/discomfort     Pain Rating (0-10): Rest 0     Pain Rating (0-10): Activity 0        Vital Signs    BP (!)  144/69   arterial line           Prior Living Environment      Most Recent Value   Lives With  spouse [wife's name: Mulugeta]   Living Arrangements  house   Living Environment Comment  2SH w/4 steps to enter w/o railing, powder room on first floor, both tub and shower stall shower on 2nd floor, bedroom on 2nd floor    Equipment Currently Used at Home  none          Prior Level of Function      Most Recent Value   Ambulation  independent   Transferring  independent   Toileting  independent   Bathing  independent   Dressing  independent   Eating  independent   Equipment Currently Used at Home  none   Prior Functional Level Comment  INd w/o AD for community  distances and stairs.  Wife does most of IADl's.  Pt was working.                 PT Treatment Summary - 04/04/19 0941        Session Details    Document Type daily treatment    Mode of Treatment physical therapy;individual therapy       Time Calculation    Start Time 0941    Stop Time 0951    Time Calculation (min) 10 min       General Information    Patient Profile Reviewed? yes    Existing Precautions/Restrictions cardiac;other (see comments)   ECMO perfusionist to be there during mobility       Sit to Stand Transfer    [place], Sit to Stand Transfer close supervision       Stand to Sit Transfer    [place], Stand to Sit Transfer close supervision       Gait Training    [place], Gait 1 person assist;minimum assist (75% or more patient effort)    Distance in Feet 100 feet    Gait Pattern Utilized step-through    Comment Pt dem stable O2 on RA! w/activity.  Pt no longer has epistaxis.  Amelia available to monitor ECMO, nsg to hold onto ECMO lines on chest, +chair follow, pt requires close sup-min A w/amb. Pt very motivated to do activity: d/w pt importance of structured progression.        AM-PAC (TM) - Mobility (Current Function)    Turning from your back to your side while in a flat bed without using bedrails? 3 - A Little    Moving from lying on your back to sitting on the side of a flat bed without using bedrails? 3 - A Little    Moving to and from a bed to a chair? 3 - A Little    Standing up from a chair using your arms? 3 - A Little    To walk in a hospital room? 3 - A Little    Climbing 3-5 steps with a railing? 3 - A Little    AM-PAC (TM) Mobility Score 18       PT Clinical Impression    Plan For Care Reviewed: Physical Therapy patient feedback incorporated in PT plan for care    Rehab Potential/Prognosis good, to achieve stated therapy goals    PT Frequency of Treatment 5-7 times per week    Anticipated Equipment Needs at Discharge none    Daily Outcome Statement Pt is very motivated.  Continue to coordinate session with Amelia and marvel.  Expect steady progress. Pt for cont echo to view progress of R heart.                         Education provided this session. See the Patient Education summary report for full details.    PT Care Plan Goals      Most Recent Value   Stair Goal, PT   PT STG: Stairs  supervision required   PT STG: Number of Stairs  4      PT Care Plan Goals      Most Recent Value   Bed Mobility Goal   Date Goal Established: Bed Mobility  04/01/19   Time to Achieve Goal: Bed Mobility  5 days   Goal Activity: Bed Mobility  all bed mobility activities   Level of [place] Goal: Bed Mobility  independent   Gait Goal   Date Goal Established: Gait Training  04/01/19   Time to Achieve Goal: Gait Training  5 days   Level of [place]  supervision required   Assistive Device: Gait Training  walker, rolling   Distance Goal: Gait Training (feet)  250 feet   Goal: Gait Training  amb w/least AD    Transfer Goal   Date Goal Established: Transfer Training  04/01/19   Time to Achieve Goal: Transfer Training  5 days   Goal Activity: Transfer Training  bed to chair/chair to bed, sit to stand/stand to sit   Level of [place] Goal: Transfer Training  independent

## 2019-04-04 NOTE — PLAN OF CARE
Problem: Patient Care Overview  Goal: Plan of Care Review  Outcome: Ongoing (interventions implemented as appropriate)   04/04/19 0028   Coping/Psychosocial   Plan Of Care Reviewed With patient   Plan of Care Review   Progress improving   Outcome Summary Increasing activity, on room air, vital signs stable.

## 2019-04-04 NOTE — PROGRESS NOTES
Also accepted by [facility] per Maki Reid.  Patient and wife request service from them. [facility] and [facility] notified via [facility]. Will follow to assist with transitions of care.

## 2019-04-04 NOTE — PROGRESS NOTES
Pulmonary Progress Note       SUBJECTIVE   Patient seen and examined at bedside this morning. No acute events overnight. He is now breathing comfortably on room air. Protek Duo being weaned.    OBJECTIVE        Vital Signs:   BP (!) 167/59   Pulse 74   Temp 36.8 °C (98.3 °F) (Temporal)   Resp 18   Ht 1.829 m (6')   Wt 113 kg (249 lb)   SpO2 97%   BMI 33.77 kg/m²     I/O's:    Intake/Output Summary (Last 24 hours) at 04/04/19 1332  Last data filed at 04/04/19 1300   Gross per 24 hour   Intake             1216 ml   Output             5100 ml   Net            -3884 ml       Medications:    Reviewed. Pertinent medications as below.      acetaminophen 975 mg oral q6h RIGOBERTO   acetylcysteine 3 mL nebulization BID (6a, 6p)   amiodarone 400 mg oral BID   aspirin 81 mg oral Daily   chlorhexidine 15 mL Mouth/Throat 2 times per day   colchicine 0.6 mg oral Daily   docusate sodium 100 mg oral BID   famotidine 20 mg oral BID   folic acid 1 mg oral Daily   furosemide 40 mg intravenous BID (9a, 4p)   guaiFENesin 1,200 mg oral BID   insulin aspart U-100 3 Units subcutaneous TID with meals   ipratropium-albuterol 3 mL nebulization q6h RIGOBERTO   lorlatinib 75 mg oral Daily   magnesium oxide 400 mg oral BID   multivitamin 1 tablet oral Daily   mupirocin 1 application Topical TID   potassium chloride 20 mEq oral BID   rosuvastatin 10 mg oral Daily (6p)   senna 1 tablet oral BID   sildenafil (antihypertensive) 20 mg oral TID   sodium chloride 2 spray Each Nostril QID   sodium chloride 10 mL intravenous q8h INT   thiamine 100 mg oral Daily       Anti-infectives     Start     Dose/Rate Route Frequency Ordered Stop    04/03/19 2015  mupirocin (BACTROBAN) 2 % ointment 1 application      1 application Topical 3 times daily 04/03/19 1923            PHYSICAL EXAMINATION        General: The patient is in no acute distress.  Resting comfortably in bed.  HEENT: Mucous membranes are moist.  Sclera are anicteric.  Neck: Supple.  No cervical  lymphadenopathy  Cardiovascular: S1 and S2 are present.  There are no murmurs.  Lungs: mild rhonchi  Abdomen: Soft, nontender and nondistended  Extremities: Cool and dry without edema  Neuro: Awake and alert.  Spontaneously moving all extremities       Diagnostic Data      Labs:    I have personally reviewed all pertinent patient laboratory results. Labs of note discussed below:         ABG Results       04/04/19 04/04/19 04/04/19                    1136 0741 0610         pH 7.49 (H) 7.47 (H) 7.43         pCO2 30 (L) 31 (L) 37         pO2 77 (L) 73 (L) 77 (L)         HCO3 23 23 25         Base Excess 0.2 -0.6 0.4                     CMP Results       04/04/19 04/04/19 04/03/19                    1135 0606 1811         NA - 139 -         K 4.0 4.3 4.1         Cl - 108 -         CO2 - 21 (L) -         Glucose - 114 (H) -         BUN - 20 -         Creatinine - 1.1 -         Calcium - 8.6 (L) -         Anion Gap - 10 -         AST - 65 (H) -         ALT - 44 -         Albumin - 2.5 (L) -         EGFR - &gt;60.0 -                     CBC Results       04/04/19 04/04/19 04/03/19                    1135 0606 2341         WBC 12.49 (H) 10.78 (H) 11.08 (H)         RBC 3.81 (L) 3.50 (L) 3.56 (L)         HGB 11.0 (L) 10.1 (L) 10.3 (L)         HCT 33.2 (L) 31.0 (L) 31.1 (L)         MCV 87.1 88.6 87.4         MCH 28.9 28.9 28.9         MCHC 33.1 32.6 33.1         PLT 81 (L) 80 (L) 88 (L)         Comment for PLT at 1135 on 04/04/19:  CONSISTENT WITH PREVIOUS RESULTS    Comment for PLT at 0606 on 04/04/19:  SPECIMEN QUALITY CHECKED. RESULTS OBTAINED AFTER VORTEXING TO ELIMINATE PLT CLUMPS    Comment for PLT at 2341 on 04/03/19:  CONSISTENT WITH PREVIOUS RESULTS. RESULTS OBTAINED AFTER VORTEXING TO ELIMINATE PLT CLUMPS        Imaging:    I have reviewed all pertinent imaging which is discussed below.    CXR 4/4/2019: no significant change    ASSESSMENT AND PLAN      58 year old male with history of Stage IV NSCLC on lorlatinib  presented for MVR complicated by RV failure. Now with continued hypoxia post extubation.      Impression  Acute Hypoxic Respiratory Failure - hypoxia improved. Hypoxia is likely multifactorial. CXR demonstrated significant bilateral atelectasis as well as evidence of fluid overload. Additionally, there was PFO noted on SAURABH intraoperatively with right to left shunt in the setting of RV failure.  Stage IV NSCLC      Recommendations:  -Would continue albuterol q6h, discontinue Mucomyst  -Diuresis as per primary team  -Continue to encourage incentive spirometer  -Supplemental oxygen as necessary  -Management of right heart failure as per primary and cardiology teams       Berhane Ortiz MD  PGY-4  Pulmonary/Critical Care Fellow  4/4/2019

## 2019-04-04 NOTE — PROGRESS NOTES
Cardiothoracic Intensivist Progress Note       CARDIOTHORACIC   Post-Operative Day # 6     Subjective     History of Present Illness: Wesley Fields is a 58 y.o. cisgender male with history of decreased exercise capacity prior to OR s/p MVR, chronic HTN, and hyperlipidemia that are being treated.     Review of Systems:                                     Constitutional: no acute distress                                                  Eyes: denies difficulty with vision  Ears, nose, mouth, throat, and face: denies oral discomfort                                        Respiratory: denies shortness of breath                                  Cardiovascular: mild chest discomfort at RVAD site                                 Gastrointestinal: denies abdominal pain                                    Genitourinary: denies difficulty voiding                                      Hematologic: denies bleeding issues                                Musculoskeletal: denies muscle pain                                      Neurological: generalized weakness                                   Integumentary: denies rash    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    Social History:   Social History     Social History   • Marital status:      Spouse name: N/A   • Number of children: 4   • Years of  education: N/A     Social History Main Topics   • Smoking status: Never Smoker   • Smokeless tobacco: Never Used   • Alcohol use Yes      Comment: 2-3 beers/day   • Drug use: No   • Sexual activity: Not Asked     Other Topics Concern   • None     Social History Narrative    Patient lives with his wife in a 2 story home-bathroom on 2nd floor       Family History:   Family History   Problem Relation Age of Onset   • COPD Mother    • Multiple myeloma Mother    • Lung cancer Father    • Cancer Sister         thyroid       Objective     Vital signs in last 24 hours:  Temp:  [36.7 °C (98.1 °F)-37.2 °C (98.9 °F)] 36.8 °C (98.3 °F)  Heart Rate:  [64-78] 78  Resp:  [18] 18      Intake/Output Summary (Last 24 hours) at 04/04/19 1056  Last data filed at 04/04/19 1000   Gross per 24 hour   Intake             1135 ml   Output             4650 ml   Net            -3515 ml     Intake/Output this shift:  I/O this shift:  In: 436 [P.O.:360; I.V.:76]  Out: 1900 [Urine:1900]    Labs  Lab Results   Component Value Date    WBC 10.78 (H) 04/04/2019    HGB 10.1 (L) 04/04/2019    HCT 31.0 (L) 04/04/2019    PLT 80 (L) 04/04/2019    ALT 44 04/04/2019    AST 65 (H) 04/04/2019     04/04/2019    K 4.3 04/04/2019     04/04/2019    CREATININE 1.1 04/04/2019    BUN 20 04/04/2019    CO2 21 (L) 04/04/2019    MG 2.3 04/04/2019    PHOS 4.0 04/04/2019    INR 1.2 04/01/2019    HGBA1C 5.6 03/29/2019    PT 14.2 04/01/2019    PTT 54 (H) 04/04/2019       Full Code    Head/Ear/Nose/Throat:     NCAT.                               Eyes:     PERRL.                     Respiratory:     diminished at the bases b/l.               Cardiovascular:     1st degree AV block, mechanical heart sounds.              Gastrointestinal:     + bowel sounds.                 Genitourinary:     No deformities.                    Extremities:     trace edema b/l lower extremities.            Musculoskeletal:      No injury or deformity.                   Neurological:      Follows commands.       Behavior/Emotional:     Cooperative.                           Lymph:      No adenopathy noted.                               Skin:      Clean, dry and intact.     Examination of the patient performed at the bedside. Rounded with ICU team and discussed management/plan with surgical staff. Medications, radiological studies and labs reviewed and discussed with attending of record, Dr. Georgi Pepe.    Cardiothoracic Assessment and Plan:    Neurologic: A&Ox3 and pain controlled. History of metastatic lung cancer to brain which has since been treated. Has been having some trouble sleeping and is upset about overall condition; Ambien for sleep and will consider anti-depression medication PRN.     Cardiovascular: Severe MR s/p MV repair now in cardiogenic shock with right heart failure, also with chronic diastolic CHF with a preserved EF and hypertension. RVAD placed intra-op due to worsening RV function. Unclear reason for acute decompensation as no CAD apparent and coronaries appeared to be de-aired thoroughly coming off bypass. Could have been  from pinching of coronary arteries at some point during manipulation of valve. Had some ST elevations but have since resolved. RVAD oxygenator has been capped however will not be explanted until TTE shows improvement of RV function. If RV function does not show improvement, will potentially need longer RV support albeit without oxygenator. Will resuscitate, monitor MVO2/CO, wean vasoactive medication and continue to optimize cardiac medications. Appreciate Cardiology input. Continue statin for dyslipidemia.     Respiratory: I personally reviewed the most recent CXR which portrayed b/l pulmonary congestion and small lung volumes relatively unchanged compared to previous exam. Has been net negative and could be component of atelectasis. Will encourage IS, mobilization and wean O2 PRN.      Genitourinary: No gambino. Creatinine trending down. Making  adequate urine output. Will avoid nephrotoxic medications. Goal for net negative fluid balance.     Endocrine: Follow FSBG.     Hematologic: No evidence active bleeding. On a heparin gtt. Acute (expected) blood loss anemia from surgery. Follow plasma free Hbg to monitor for hemolysis. Secondary thrombocytopenia.     Infectious Disease: No indication for antibiotics at this time. Metastatic CA appears to be regressing by latest CT scan.     Fluids, Electrolytes: Electrolytes replaced per protocol. Goal for net negative fluid balance.     Gastrointestinal: PO as tolerated. GI ppx. LFTs have trended down.     Skin: OOB, PT.      Tubes, lines and drains: Will remove superfluous invasive monitors PRN.    Disposition: Critically ill.  Right heart failure with RVAD support s/p major invasive cardiac surgery. Continue ICU care.     I personally spent 35 minutes of critical care time with this patient not including procedure time.          Antonio Lafleur,   4/4/2019

## 2019-04-04 NOTE — PROGRESS NOTES
Cardiology Progress Note    SUBJECTIVE  No acute events overnight. Patient feels well. Attempting to wean Proteck today.    Inpatient medications:  •  acetaminophen, 975 mg, oral, q6h RIGOBERTO  •  acetylcysteine, 3 mL, nebulization, BID (6a, 6p)  •  albumin human, 500 mL, intravenous, PRN  •  alum-mag hydroxide-simeth, 30 mL, oral, q4h PRN  •  amiodarone, 400 mg, oral, BID  •  aspirin, 81 mg, oral, Daily  •  atropine, 0.5 mg, intravenous, q5 min PRN  •  bisacodyl, 10 mg, rectal, Daily PRN  •  calcium chloride, 1 g, intravenous, q6h PRN  •  chlorhexidine, 15 mL, Mouth/Throat, 2 times per day  •  colchicine, 0.6 mg, oral, Daily  •  insulin, 0-15 Units/hr, intravenous, Titrated **AND** dextrose in water, 10-30 mL, intravenous, PRN  •  diphenhydrAMINE, 25 mg, oral, q6h PRN **OR** diphenhydrAMINE, 25 mg, intravenous, q6h PRN  •  DOBUTamine, 3 mcg/kg/min, intravenous, Continuous  •  docusate sodium, 100 mg, oral, BID  •  [DISCONTINUED] famotidine, 20 mg, intravenous, BID **OR** famotidine, 20 mg, oral, BID  •  folic acid, 1 mg, oral, Daily  •  furosemide, 40 mg, intravenous, BID (9a, 4p)  •  guaiFENesin, 1,200 mg, oral, BID  •  heparin, 1,400 Units/hr, intravenous, Titrated  •  HYDROmorphone, 0.5 mg, intravenous, q1h PRN  •  insulin aspart U-100, 3 Units, subcutaneous, TID with meals  •  ipratropium-albuterol, 3 mL, nebulization, q6h RIGOBERTO  •  lorlatinib, 75 mg, oral, Daily  •  magnesium oxide, 400 mg, oral, BID  •  magnesium sulfate, 2 g, intravenous, PRN  •  melatonin, 3 mg, oral, Nightly PRN  •  midazolam, 2 mg, intravenous, q4h PRN  •  multivitamin, 1 tablet, oral, Daily  •  mupirocin, 1 application, Topical, TID  •  ondansetron ODT, 4 mg, oral, q8h PRN **OR** ondansetron, 4 mg, intravenous, q8h PRN  •  oxyCODONE, 10 mg, oral, q6h PRN  •  oxyCODONE, 5 mg, oral, q4h PRN  •  oxymetazoline, 1 spray, Each Nostril, 2x daily PRN  •  potassium chloride, 20 mEq, oral, BID  •  potassium chloride, 20 mEq, intravenous, q8h PRN  •   rosuvastatin, 10 mg, oral, Daily (6p)  •  senna, 1 tablet, oral, 2x daily PRN  •  senna, 1 tablet, oral, BID  •  sildenafil (antihypertensive), 20 mg, oral, TID  •  sodium bicarbonate, 45-90 mEq, intravenous, PRN  •  sodium chloride, 2 spray, Each Nostril, QID  •  sodium chloride 0.9 %, , intravenous, Continuous  •  sodium chloride, 10 mL, intravenous, q8h INT  •  sodium chloride, 10 mL, intravenous, PRN  •  thiamine, 100 mg, oral, Daily  •  vasopressin (PITRESSIN) continuous infusion, 0.01-0.1 Units/min, intravenous, Continuous    Review of Systems: Review of systems otherwise normal.    OBJECTIVE:   BP (!) 167/59   Pulse 74   Temp 36.8 °C (98.3 °F) (Temporal)   Resp 18   Ht 1.829 m (6')   Wt 113 kg (249 lb)   SpO2 97%   BMI 33.77 kg/m²       Intake/Output Summary (Last 24 hours) at 04/04/19 1325  Last data filed at 04/04/19 1300   Gross per 24 hour   Intake             1216 ml   Output             5100 ml   Net            -3884 ml     Physical Exam   Constitutional: Well-developed and well-nourished. No distress.   HENT: Normocephalic and atraumatic. Moist mucous membranes.  Eyes: Sclera anicteric.  Cardiovascular: Normal rate and regular rhythm.   Pulmonary/Chest: Normal effort and air entry.   Abdominal: Soft, non tender, no distension.   Musculoskeletal: +1 LE edema  Neurological: Alert and oriented to person, place, and time.   Skin: Skin is warm and dry.   Psychiatric: Normal mood, affect and behavior.    Labs    Results from last 7 days  Lab Units 04/04/19  1135 04/04/19  0606 04/03/19  1811  04/03/19  0518  04/02/19  0608   SODIUM mEQ/L  --  139  --   --  138  --  138   POTASSIUM mEQ/L 4.0 4.3 4.1  < > 4.4  < > 4.4   CHLORIDE mEQ/L  --  108  --   --  109  --  107   CO2 mEQ/L  --  21*  --   --  21*  --  19*   BUN mg/dL  --  20  --   --  22*  --  22*   CREATININE mg/dL  --  1.1  --   --  1.0  --  1.2   CALCIUM mg/dL  --  8.6*  --   --  8.4*  --  8.1*   ALBUMIN g/dL  --  2.5*  --   --  2.5*  --  2.7*    BILIRUBIN TOTAL mg/dL  --  0.8  --   --  0.7  --  0.9   ALK PHOS IU/L  --  56  --   --  58  --  60   ALT IU/L  --  44  --   --  49  --  50   AST IU/L  --  65*  --   --  92*  --  112*   GLUCOSE mg/dL  --  114*  --   --  109*  --  122*   < > = values in this interval not displayed.    Results from last 7 days  Lab Units 04/04/19  1135 04/04/19  0606 04/03/19  2341   WBC K/uL 12.49* 10.78* 11.08*   HEMOGLOBIN g/dL 11.0* 10.1* 10.3*   HEMATOCRIT % 33.2* 31.0* 31.1*   PLATELETS K/uL 81* 80* 88*             Cardiology results  Telemetry : SR  TTE:   Cardiac Imaging   TRANSTHORACIC ECHO (TTE) LIMITED 04/02/2019    Narrative Limited study for RVAD wean  1. Mild concentric left ventricular hypertrophy with normal cavity size   and preserved systolic function. Cannot adequately assess regional wall   motion abnormalities. Abnormal septal motion.  2. Moderately dilated right ventricular size with severely decreased   systolic function (TAPSE 0.75-0.8cm).  The right ventricular outflow tract   however displays some contraction.  Blancas sign is also present.    Protek-Duo Catheter is visualized in the right-sided cardiac structures.   During wean of RVAD there is a slight increase in right ventricular size   without appreciable change in RV function by TAPSE.   3. Grossly normal aortic valve.   4. Thickened mitral leaflets. Status post mitral valve repair with   annuloplasty ring. No obvious mitral regurgitation on the limited views   obtained.  5. Mildly dilated left atrium.  6. Mild tricuspid regurgitation.  7. Trace posterior pericardial effusion.            Imaging: Imaging reviewed.    ASSESSMENT AND PLAN  58 y.o. male admitted for Mitral valve insufficiency, unspecified etiology [I34.0]  Mitral regurgitation [I34.0], Cardiology consulted for:    Pulmonary hypertension (CMS/HCC) ([facility])   Assessment & Plan    Patient known to have mild pulmonary hypertension based on RHC prior to admission (mean PA 28 mmHg and PWCP of 14  mmHg). His hospital course has been complicated by RV failure requiring mechanical support. Continue Sildenafil 20 mg TID to help reduce RV afterload. Attempting to wean off RVAD support today.        Right ventricular dysfunction   Assessment & Plan    Management per CT surgery with continued mechanical support.         * Mitral valve insufficiency   Assessment & Plan    He is status post mitral valve repair. Management per CT surgery.             Za Norman,   4/4/2019  1:25 PM

## 2019-04-05 ENCOUNTER — APPOINTMENT (INPATIENT)
Dept: RADIOLOGY | Facility: HOSPITAL | Age: 59
DRG: 003 | End: 2019-04-05
Attending: CLINICAL NURSE SPECIALIST
Payer: COMMERCIAL

## 2019-04-05 ENCOUNTER — APPOINTMENT (INPATIENT)
Dept: RADIOLOGY | Facility: HOSPITAL | Age: 59
DRG: 003 | End: 2019-04-05
Attending: PHYSICIAN ASSISTANT
Payer: COMMERCIAL

## 2019-04-05 LAB
ALBUMIN SERPL-MCNC: 2.5 G/DL (ref 3.4–5)
ALP SERPL-CCNC: 65 IU/L (ref 35–126)
ALT SERPL-CCNC: 41 IU/L (ref 16–63)
ANION GAP SERPL CALC-SCNC: 10 MEQ/L (ref 3–15)
APTT PPP: 152 SEC (ref 23–35)
APTT PPP: 43 SEC (ref 23–35)
APTT PPP: 44 SEC (ref 23–35)
APTT PPP: 46 SEC (ref 23–35)
APTT PPP: 46 SEC (ref 23–35)
AST SERPL-CCNC: 44 IU/L (ref 15–41)
BASE EXCESS BLDA CALC-SCNC: 1.1 MEQ/L
BILIRUB SERPL-MCNC: 0.9 MG/DL (ref 0.3–1.2)
BUN SERPL-MCNC: 18 MG/DL (ref 8–20)
CA-I BLD-SCNC: 1.13 MMOL/L (ref 1.15–1.27)
CALCIUM SERPL-MCNC: 8.2 MG/DL (ref 8.9–10.3)
CHLORIDE SERPL-SCNC: 105 MEQ/L (ref 98–109)
CO2 BLDA-SCNC: 26 MEQ/L (ref 22–32)
CO2 SERPL-SCNC: 21 MEQ/L (ref 22–32)
CREAT SERPL-MCNC: 1 MG/DL
ERYTHROCYTE [DISTWIDTH] IN BLOOD BY AUTOMATED COUNT: 14.9 % (ref 11.6–14.4)
ERYTHROCYTE [DISTWIDTH] IN BLOOD BY AUTOMATED COUNT: 15 % (ref 11.6–14.4)
ERYTHROCYTE [DISTWIDTH] IN BLOOD BY AUTOMATED COUNT: 15 % (ref 11.6–14.4)
ERYTHROCYTE [DISTWIDTH] IN BLOOD BY AUTOMATED COUNT: 15.1 % (ref 11.6–14.4)
FIBRINOGEN PPP-MCNC: 315 MG/DL (ref 220–480)
FIBRINOGEN PPP-MCNC: 425 MG/DL (ref 220–480)
FIBRINOGEN PPP-MCNC: 478 MG/DL (ref 220–480)
GFR SERPL CREATININE-BSD FRML MDRD: >60 ML/MIN/1.73M*2
GLUCOSE BLDA-MCNC: 136 MG/DL (ref 65–95)
GLUCOSE SERPL-MCNC: 134 MG/DL (ref 70–99)
GRAM STN SPEC: NORMAL
HCO3 BLDA-SCNC: 25 MEQ/L (ref 21–28)
HCT VFR BLDA CALC: 28 % (ref 36–48)
HCT VFR BLDCO AUTO: 28.5 %
HCT VFR BLDCO AUTO: 29.2 %
HCT VFR BLDCO AUTO: 29.7 %
HCT VFR BLDCO AUTO: 31.5 %
HGB BLD-MCNC: 10.3 G/DL
HGB BLD-MCNC: 9.4 G/DL
HGB BLD-MCNC: 9.6 G/DL
HGB BLD-MCNC: 9.8 G/DL
HGB FREE PLAS-MCNC: 30 MG/DL
HGB FREE PLAS-MCNC: 40 MG/DL
HGB FREE PLAS-MCNC: <30 MG/DL
INR PPP: 1.2 INR
LACTATE BLDA-SCNC: 0.6 MMOL/L (ref 0.4–1.6)
MAGNESIUM SERPL-MCNC: 2.1 MG/DL (ref 1.8–2.5)
MAGNESIUM SERPL-MCNC: 2.4 MG/DL (ref 1.8–2.5)
MAGNESIUM SERPL-MCNC: 2.4 MG/DL (ref 1.8–2.5)
MCH RBC QN AUTO: 28.9 PG (ref 28–33.2)
MCH RBC QN AUTO: 29 PG (ref 28–33.2)
MCHC RBC AUTO-ENTMCNC: 32.7 G/DL (ref 32.2–36.5)
MCHC RBC AUTO-ENTMCNC: 32.9 G/DL (ref 32.2–36.5)
MCHC RBC AUTO-ENTMCNC: 33 G/DL (ref 32.2–36.5)
MCHC RBC AUTO-ENTMCNC: 33 G/DL (ref 32.2–36.5)
MCV RBC AUTO: 87.6 FL (ref 83–98)
MCV RBC AUTO: 88 FL (ref 83–98)
MCV RBC AUTO: 88 FL (ref 83–98)
MCV RBC AUTO: 88.5 FL (ref 83–98)
PCO2 BLDA: 35 MM HG (ref 35–48)
PDW BLD AUTO: 10.6 FL (ref 9.4–12.4)
PDW BLD AUTO: 10.9 FL (ref 9.4–12.4)
PDW BLD AUTO: 10.9 FL (ref 9.4–12.4)
PDW BLD AUTO: 11 FL (ref 9.4–12.4)
PH BLDA: 7.46 PH (ref 7.35–7.45)
PHOSPHATE SERPL-MCNC: 4.4 MG/DL (ref 2.4–4.7)
PLATELET # BLD AUTO: 74 K/UL
PLATELET # BLD AUTO: 76 K/UL
PLATELET # BLD AUTO: 78 K/UL
PLATELET # BLD AUTO: 81 K/UL
PO2 BLDA: 76 MM HG (ref 83–100)
POCT PATIENT TEMPERATURE: 98.6 °F (ref 97–99)
POCT TEST (BLD GAS): ABNORMAL
POTASSIUM BLDA-SCNC: 4.2 MEQ/L (ref 3.4–4.5)
POTASSIUM SERPL-SCNC: 4 MEQ/L (ref 3.6–5.1)
POTASSIUM SERPL-SCNC: 4.1 MEQ/L (ref 3.6–5.1)
POTASSIUM SERPL-SCNC: 4.3 MEQ/L (ref 3.6–5.1)
PROT SERPL-MCNC: 4.4 G/DL (ref 6–8.2)
PROTHROMBIN TIME: 14.4 SEC (ref 12.2–14.5)
RBC # BLD AUTO: 3.24 M/UL (ref 4.5–5.8)
RBC # BLD AUTO: 3.32 M/UL (ref 4.5–5.8)
RBC # BLD AUTO: 3.39 M/UL (ref 4.5–5.8)
RBC # BLD AUTO: 3.56 M/UL (ref 4.5–5.8)
SAO2 % BLDA: 96 % (ref 93–98)
SAO2 % BLDV: 56.1 % (ref 30–60)
SAO2 % BLDV: 59.5 % (ref 30–60)
SODIUM BLDA-SCNC: 133 MEQ/L (ref 136–145)
SODIUM SERPL-SCNC: 136 MEQ/L (ref 136–144)
WBC # BLD AUTO: 14.14 K/UL
WBC # BLD AUTO: 14.32 K/UL
WBC # BLD AUTO: 14.97 K/UL
WBC # BLD AUTO: 15.88 K/UL

## 2019-04-05 PROCEDURE — 20000000 HC ROOM AND CARE ICU

## 2019-04-05 PROCEDURE — 84100 ASSAY OF PHOSPHORUS: CPT | Performed by: PHYSICIAN ASSISTANT

## 2019-04-05 PROCEDURE — 63700000 HC SELF-ADMINISTRABLE DRUG: Performed by: PHYSICIAN ASSISTANT

## 2019-04-05 PROCEDURE — 99233 SBSQ HOSP IP/OBS HIGH 50: CPT | Performed by: INTERNAL MEDICINE

## 2019-04-05 PROCEDURE — 97110 THERAPEUTIC EXERCISES: CPT | Mod: GP

## 2019-04-05 PROCEDURE — 93971 EXTREMITY STUDY: CPT | Mod: RT

## 2019-04-05 PROCEDURE — 25800000 HC PHARMACY IV SOLUTIONS: Performed by: PHYSICIAN ASSISTANT

## 2019-04-05 PROCEDURE — 63600000 HC DRUGS/DETAIL CODE: Performed by: PHYSICIAN ASSISTANT

## 2019-04-05 PROCEDURE — 74177 CT ABD & PELVIS W/CONTRAST: CPT

## 2019-04-05 PROCEDURE — 83735 ASSAY OF MAGNESIUM: CPT | Performed by: CLINICAL NURSE SPECIALIST

## 2019-04-05 PROCEDURE — 63600105 HC IODINE BASED CONTRAST: Performed by: CLINICAL NURSE SPECIALIST

## 2019-04-05 PROCEDURE — 63700000 HC SELF-ADMINISTRABLE DRUG: Performed by: NURSE PRACTITIONER

## 2019-04-05 PROCEDURE — 87205 SMEAR GRAM STAIN: CPT | Performed by: PHYSICIAN ASSISTANT

## 2019-04-05 PROCEDURE — 85610 PROTHROMBIN TIME: CPT | Performed by: PHYSICIAN ASSISTANT

## 2019-04-05 PROCEDURE — 71260 CT THORAX DX C+: CPT

## 2019-04-05 PROCEDURE — 99291 CRITICAL CARE FIRST HOUR: CPT | Mod: 25 | Performed by: ANESTHESIOLOGY

## 2019-04-05 PROCEDURE — 87070 CULTURE OTHR SPECIMN AEROBIC: CPT | Performed by: PHYSICIAN ASSISTANT

## 2019-04-05 PROCEDURE — 80053 COMPREHEN METABOLIC PANEL: CPT | Performed by: CLINICAL NURSE SPECIALIST

## 2019-04-05 PROCEDURE — 85730 THROMBOPLASTIN TIME PARTIAL: CPT | Performed by: NURSE PRACTITIONER

## 2019-04-05 PROCEDURE — 25000000 HC PHARMACY GENERAL: Performed by: PHYSICIAN ASSISTANT

## 2019-04-05 PROCEDURE — 94640 AIRWAY INHALATION TREATMENT: CPT

## 2019-04-05 PROCEDURE — 82810 BLOOD GASES O2 SAT ONLY: CPT | Performed by: CLINICAL NURSE SPECIALIST

## 2019-04-05 PROCEDURE — 71045 X-RAY EXAM CHEST 1 VIEW: CPT

## 2019-04-05 PROCEDURE — 97116 GAIT TRAINING THERAPY: CPT | Mod: GP

## 2019-04-05 PROCEDURE — P9045 ALBUMIN (HUMAN), 5%, 250 ML: HCPCS | Performed by: PHYSICIAN ASSISTANT

## 2019-04-05 PROCEDURE — 36415 COLL VENOUS BLD VENIPUNCTURE: CPT | Performed by: CLINICAL NURSE SPECIALIST

## 2019-04-05 PROCEDURE — 85384 FIBRINOGEN ACTIVITY: CPT | Performed by: CLINICAL NURSE SPECIALIST

## 2019-04-05 PROCEDURE — 83051 HEMOGLOBIN PLASMA: CPT | Performed by: CLINICAL NURSE SPECIALIST

## 2019-04-05 PROCEDURE — 48000013 HC VENTRICULAR ASSIST, ECMO HOURLY

## 2019-04-05 PROCEDURE — 63600000 HC DRUGS/DETAIL CODE: Performed by: NURSE PRACTITIONER

## 2019-04-05 PROCEDURE — 85027 COMPLETE CBC AUTOMATED: CPT | Performed by: CLINICAL NURSE SPECIALIST

## 2019-04-05 PROCEDURE — 83735 ASSAY OF MAGNESIUM: CPT | Performed by: PHYSICIAN ASSISTANT

## 2019-04-05 PROCEDURE — 84132 ASSAY OF SERUM POTASSIUM: CPT | Performed by: CLINICAL NURSE SPECIALIST

## 2019-04-05 RX ORDER — MUPIROCIN 20 MG/G
1 OINTMENT TOPICAL 3 TIMES DAILY
Status: DISCONTINUED | OUTPATIENT
Start: 2019-04-05 | End: 2019-04-11 | Stop reason: HOSPADM

## 2019-04-05 RX ORDER — KETOROLAC TROMETHAMINE 15 MG/ML
15 INJECTION, SOLUTION INTRAMUSCULAR; INTRAVENOUS
Status: DISCONTINUED | OUTPATIENT
Start: 2019-04-05 | End: 2019-04-07

## 2019-04-05 RX ORDER — IBUPROFEN/PSEUDOEPHEDRINE HCL 200MG-30MG
6 TABLET ORAL NIGHTLY
Status: DISCONTINUED | OUTPATIENT
Start: 2019-04-05 | End: 2019-04-05

## 2019-04-05 RX ORDER — FUROSEMIDE 10 MG/ML
40 INJECTION INTRAMUSCULAR; INTRAVENOUS DAILY
Status: DISCONTINUED | OUTPATIENT
Start: 2019-04-06 | End: 2019-04-10

## 2019-04-05 RX ORDER — KETOROLAC TROMETHAMINE 15 MG/ML
15 INJECTION, SOLUTION INTRAMUSCULAR; INTRAVENOUS ONCE
Status: COMPLETED | OUTPATIENT
Start: 2019-04-05 | End: 2019-04-05

## 2019-04-05 RX ADMIN — SALINE NASAL SPRAY 2 SPRAY: 1.5 SOLUTION NASAL at 17:46

## 2019-04-05 RX ADMIN — POTASSIUM CHLORIDE 20 MEQ: 200 INJECTION, SOLUTION INTRAVENOUS at 11:30

## 2019-04-05 RX ADMIN — SILDENAFIL 20 MG: 20 TABLET ORAL at 08:25

## 2019-04-05 RX ADMIN — DIPHENHYDRAMINE HYDROCHLORIDE 25 MG: 25 CAPSULE ORAL at 22:34

## 2019-04-05 RX ADMIN — MULTIPLE VITAMINS W/ MINERALS TAB 1 TABLET: TAB at 08:24

## 2019-04-05 RX ADMIN — SILDENAFIL 20 MG: 20 TABLET ORAL at 14:58

## 2019-04-05 RX ADMIN — FAMOTIDINE 20 MG: 20 TABLET, FILM COATED ORAL at 20:37

## 2019-04-05 RX ADMIN — SALINE NASAL SPRAY 2 SPRAY: 1.5 SOLUTION NASAL at 08:25

## 2019-04-05 RX ADMIN — Medication 10 ML: at 09:08

## 2019-04-05 RX ADMIN — ACETAMINOPHEN 975 MG: 325 TABLET ORAL at 05:59

## 2019-04-05 RX ADMIN — GUAIFENESIN 1200 MG: 600 TABLET, EXTENDED RELEASE ORAL at 08:24

## 2019-04-05 RX ADMIN — Medication 10 ML: at 17:22

## 2019-04-05 RX ADMIN — COLCHICINE 0.6 MG: 0.6 TABLET, FILM COATED ORAL at 08:25

## 2019-04-05 RX ADMIN — HEPARIN SODIUM 1400 UNITS/HR: 10000 INJECTION, SOLUTION INTRAVENOUS at 11:30

## 2019-04-05 RX ADMIN — CHLORHEXIDINE GLUCONATE 15 ML: 1.2 RINSE ORAL at 20:42

## 2019-04-05 RX ADMIN — POTASSIUM CHLORIDE 20 MEQ: 200 INJECTION, SOLUTION INTRAVENOUS at 00:57

## 2019-04-05 RX ADMIN — CALCIUM CHLORIDE 1 G: 100 INJECTION, SOLUTION INTRAVENOUS at 05:59

## 2019-04-05 RX ADMIN — MUPIROCIN 1 APPLICATION.: 20 OINTMENT TOPICAL at 14:54

## 2019-04-05 RX ADMIN — ACETYLCYSTEINE 600 MG: 200 INHALANT RESPIRATORY (INHALATION) at 07:31

## 2019-04-05 RX ADMIN — SILDENAFIL 20 MG: 20 TABLET ORAL at 20:39

## 2019-04-05 RX ADMIN — MAGNESIUM GLUCONATE 500 MG ORAL TABLET 400 MG: 500 TABLET ORAL at 08:25

## 2019-04-05 RX ADMIN — POTASSIUM CHLORIDE 20 MEQ: 200 INJECTION, SOLUTION INTRAVENOUS at 05:59

## 2019-04-05 RX ADMIN — ACETAMINOPHEN 975 MG: 325 TABLET ORAL at 00:00

## 2019-04-05 RX ADMIN — KETOROLAC TROMETHAMINE 15 MG: 15 INJECTION, SOLUTION INTRAMUSCULAR; INTRAVENOUS at 17:46

## 2019-04-05 RX ADMIN — CHLORHEXIDINE GLUCONATE 15 ML: 1.2 RINSE ORAL at 09:11

## 2019-04-05 RX ADMIN — MAGNESIUM GLUCONATE 500 MG ORAL TABLET 400 MG: 500 TABLET ORAL at 20:38

## 2019-04-05 RX ADMIN — AMIODARONE HYDROCHLORIDE 400 MG: 200 TABLET ORAL at 08:24

## 2019-04-05 RX ADMIN — ALBUMIN (HUMAN) 500 ML: 12.5 SOLUTION INTRAVENOUS at 17:42

## 2019-04-05 RX ADMIN — MUPIROCIN 1 APPLICATION.: 20 OINTMENT TOPICAL at 20:38

## 2019-04-05 RX ADMIN — POTASSIUM CHLORIDE 20 MEQ: 750 TABLET, FILM COATED, EXTENDED RELEASE ORAL at 08:25

## 2019-04-05 RX ADMIN — SALINE NASAL SPRAY 2 SPRAY: 1.5 SOLUTION NASAL at 20:39

## 2019-04-05 RX ADMIN — FAMOTIDINE 20 MG: 20 TABLET, FILM COATED ORAL at 08:25

## 2019-04-05 RX ADMIN — MAGNESIUM SULFATE 2 G: 2 INJECTION INTRAVENOUS at 00:57

## 2019-04-05 RX ADMIN — POTASSIUM CHLORIDE 20 MEQ: 750 TABLET, FILM COATED, EXTENDED RELEASE ORAL at 20:39

## 2019-04-05 RX ADMIN — FOLIC ACID 1 MG: 1 TABLET ORAL at 08:25

## 2019-04-05 RX ADMIN — Medication 100 MG: at 08:25

## 2019-04-05 RX ADMIN — Medication 10 ML: at 01:09

## 2019-04-05 RX ADMIN — ROSUVASTATIN CALCIUM 10 MG: 10 TABLET, FILM COATED ORAL at 17:45

## 2019-04-05 RX ADMIN — DOCUSATE SODIUM 100 MG: 100 CAPSULE, LIQUID FILLED ORAL at 20:37

## 2019-04-05 RX ADMIN — GUAIFENESIN 1200 MG: 600 TABLET, EXTENDED RELEASE ORAL at 20:38

## 2019-04-05 RX ADMIN — MUPIROCIN 1 APPLICATION.: 20 OINTMENT TOPICAL at 09:07

## 2019-04-05 RX ADMIN — IPRATROPIUM BROMIDE AND ALBUTEROL SULFATE 3 ML: 2.5; .5 SOLUTION RESPIRATORY (INHALATION) at 20:29

## 2019-04-05 RX ADMIN — ACETAMINOPHEN 975 MG: 325 TABLET ORAL at 12:09

## 2019-04-05 RX ADMIN — AMIODARONE HYDROCHLORIDE 400 MG: 200 TABLET ORAL at 20:36

## 2019-04-05 RX ADMIN — ASPIRIN 81 MG: 81 TABLET, COATED ORAL at 08:25

## 2019-04-05 RX ADMIN — IOHEXOL 150 ML: 350 INJECTION, SOLUTION INTRAVENOUS at 20:04

## 2019-04-05 RX ADMIN — KETOROLAC TROMETHAMINE 15 MG: 15 INJECTION, SOLUTION INTRAMUSCULAR; INTRAVENOUS at 06:40

## 2019-04-05 RX ADMIN — IPRATROPIUM BROMIDE AND ALBUTEROL SULFATE 3 ML: 2.5; .5 SOLUTION RESPIRATORY (INHALATION) at 15:02

## 2019-04-05 RX ADMIN — STANDARDIZED SENNA CONCENTRATE 1 TABLET: 8.6 TABLET ORAL at 20:37

## 2019-04-05 RX ADMIN — FUROSEMIDE 40 MG: 10 INJECTION, SOLUTION INTRAMUSCULAR; INTRAVENOUS at 08:25

## 2019-04-05 RX ADMIN — IPRATROPIUM BROMIDE AND ALBUTEROL SULFATE 3 ML: 2.5; .5 SOLUTION RESPIRATORY (INHALATION) at 07:31

## 2019-04-05 RX ADMIN — Medication 6 MG: at 22:34

## 2019-04-05 RX ADMIN — SALINE NASAL SPRAY 2 SPRAY: 1.5 SOLUTION NASAL at 14:54

## 2019-04-05 ASSESSMENT — COGNITIVE AND FUNCTIONAL STATUS - GENERAL
WALKING IN HOSPITAL ROOM: 3 - A LITTLE
CLIMB 3 TO 5 STEPS WITH RAILING: 3 - A LITTLE
STANDING UP FROM CHAIR USING ARMS: 3 - A LITTLE
MOVING TO AND FROM BED TO CHAIR: 3 - A LITTLE

## 2019-04-05 NOTE — CONSULTS
ID Consult Dictated #[id number]      IMPRESSION:    Inc WBC  S/p MV repair and RVAD   no sign of infection      RECOMMENDATION:    Will observe off abx

## 2019-04-05 NOTE — PROGRESS NOTES
Pulmonary/Critical Care Progress Note       SUBJECTIVE   Patient seen and examined. Sitting in chair. No respiratory complaints. Overall doing well. Remains on RVAD support. No oxygenator with RVAD.      OBJECTIVE        Vital Signs:     Temp (24hrs), Av.6 °C (99.6 °F), Min:36.3 °C (97.4 °F), Max:38 °C (100.4 °F)      BP (!) 133/57 (BP Location: Other (Comment), Patient Position: Sitting) Comment (BP Location): R arterial A-line  Pulse 74   Temp 36.3 °C (97.4 °F) (Temporal)   Resp 18   Ht 1.829 m (6')   Wt 117 kg (258 lb 13.1 oz)   SpO2 97%   BMI 35.10 kg/m²     I/O's:    Intake/Output Summary (Last 24 hours) at 19 1004  Last data filed at 19 0900   Gross per 24 hour   Intake             3403 ml   Output             4800 ml   Net            -1397 ml       Ventilator Settings:  None         Medications:      acetaminophen 975 mg oral q6h RIGOBERTO   amiodarone 400 mg oral BID   aspirin 81 mg oral Daily   chlorhexidine 15 mL Mouth/Throat 2 times per day   colchicine 0.6 mg oral Daily   docusate sodium 100 mg oral BID   famotidine 20 mg oral BID   folic acid 1 mg oral Daily   furosemide 40 mg intravenous BID (9a, 4p)   guaiFENesin 1,200 mg oral BID   ipratropium-albuterol 3 mL nebulization q6h RIGOBERTO   ketorolac 15 mg intravenous q12h (6a, 6p)   lorlatinib 75 mg oral Daily   magnesium oxide 400 mg oral BID   multivitamin 1 tablet oral Daily   mupirocin 1 application Topical TID   potassium chloride 20 mEq oral BID   rosuvastatin 10 mg oral Daily (6p)   senna 1 tablet oral BID   sildenafil (antihypertensive) 20 mg oral TID   sodium chloride 2 spray Each Nostril QID   sodium chloride 10 mL intravenous q8h INT   thiamine 100 mg oral Daily       Anti-infectives     Start     Dose/Rate Route Frequency Ordered Stop    19 0930  mupirocin (BACTROBAN) 2 % ointment 1 application      1 application Topical 3 times daily 19 0831            PHYSICAL EXAMINATION        General: The patient is awake and  sitting in chair.No acute distress  HEENT: moist mucosal membranes, EOMI,   Neck: Supple. Non tender. No cervical lymphadenopathy. No JVD  Cardiovascular: S1 and S2 are present.  There are no murmurs.  Lungs: CTA b/l  Abdomen: Soft, nontender and nondistended, BS+  Extremities: no edema, no tenderness  Neuro: Awake and alert.  Spontaneously moving all extremities                  Diagnostic Data      Labs:    I have personally reviewed all pertinent patient laboratory results. Labs of note discussed below:    ABG Results       04/05/19 04/04/19 04/04/19 0428 2342 1136         pH 7.46 (H) 7.47 (H) 7.49 (H)         pCO2 35 35 30 (L)         pO2 76 (L) 71 (L) 77 (L)         HCO3 25 26 23         Base Excess 1.1 1.9 0.2                     CMP Results       04/05/19 04/04/19 04/04/19                    0424 2335 1804          - -         K 4.1 4.0 4.0         Cl 105 - -         CO2 21 (L) - -         Glucose 134 (H) - -         BUN 18 - -         Creatinine 1.0 - -         Calcium 8.2 (L) - -         Anion Gap 10 - -         AST 44 (H) - -         ALT 41 - -         Albumin 2.5 (L) - -         EGFR &gt;60.0 - -                     CBC Results       04/05/19 04/04/19 04/04/19                    0424 2335 1803         WBC 14.32 (H) 14.97 (H) 13.70 (H)         RBC 3.24 (L) 3.39 (L) 3.84 (L)         HGB 9.4 (L) 9.8 (L) 10.9 (L)         HCT 28.5 (L) 29.7 (L) 33.6 (L)         MCV 88.0 87.6 87.5         MCH 29.0 28.9 28.4         MCHC 33.0 33.0 32.4         PLT 74 (L) 76 (L) 64 (L)         Comment for PLT at 0424 on 04/05/19:  CONSISTENT WITH PREVIOUS RESULTS    Comment for PLT at 2335 on 04/04/19:  CONSISTENT WITH PREVIOUS RESULTS    Comment for PLT at 1803 on 04/04/19:  CONSISTENT WITH PREVIOUS RESULTS            Imaging:    I have reviewed all pertinent imaging since our last encounter which is discussed below.          ASSESSMENT AND PLAN      Mr. Fields is a 58 year old male with history of Stage  IV NSCLC on lorlatinib presented for MVR complicated by RV failure. Now with continued hypoxia post extubation.        #Acute Hypoxic Respiratory Failure - hypoxia improved. Hypoxia is likely multifactorial. CXR demonstrated significant bilateral atelectasis as well as evidence of fluid overload. Additionally, there was PFO noted on SAURABH intraoperatively with right to left shunt in the setting of RV failure.  #Stage IV NSCLC      Recommendations:  -Would continue albuterol q6h  -Diuresis as per primary team  -Continue to encourage incentive spirometer  -Supplemental oxygen as necessary, now doing well on room air  -Management of right heart failure as per primary and cardiology teams       Gerardo Velasquez MD  PGY-6  Pulmonary and Critical Care  4/5/2019

## 2019-04-05 NOTE — PROCEDURES
Indications: DIC Sick Circuit  RV shock RV failure Cardiogenic Shock    Procedure:  Patient prepped and draped, Lines cleaned with chlor and betadine prep and let to dry  New circuit cut shorter and used two 3/8 inch adapters x 2.    Perfusion at bedside Choco Dietrich  Lines cut and prepared.  Patients lines clamped off after reducing pump to 2000 rpm and cut off  No thermal modification was made since no oxygenator not in circuit    After clamping and cutting lines  We made wet to wet connections on the lines and the RoTO flow was re initated    Successful Reinitationg of pump

## 2019-04-05 NOTE — PROGRESS NOTES
Patient: Wesley Fields  Location: [address] [address]  MRN: [id number]  Today's date: 4/5/2019     Patient completed cardiac rehabilitation exercise program:  seated warm-ups for vasodilation x 10 each and cool-downs for venous return x 5 each. Ankle pumps, LAQ, hip flexion, bicep curls, shoulder flexion. Verbal cues for decreased pacing.    Patient performed sit<-->stand with close sup.  Verbal cues to monitor for dizziness at sitting surface.    Patient ambulated 124 feet with no AD and close sup, step-through pattern.  Verbal cues for rest breaks PRN.    Patient reported 13/20 using Rate of Perceived Exertion Scale.      Patient left sitting in chair, all stated needs met.  RN aware.    Cardiac Index 3.7-->4.1  Stroke Volume 114-->126    Hospital Course  Wesley STALLINGS is a 58 y.o. male admitted on 3/29/2019 with Mitral valve insufficiency, unspecified etiology [I34.0]  Mitral regurgitation [I34.0]. Principal problem is Mitral valve insufficiency.    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     Admit w/mitral insufficiency s/p MVR w/ dora cords, +thoractomy> hosp course complicated by hypoxemia, +PFO w/cardiogenic shock/R ventricular dysfunction> now on VV ECMO thru subclavian    4/3/19 Cardiology assessed pt: plan to remain on ECMO through this week.     4/5 improving R heart function per SAURABH on 4/4          Therapy Pain/Vitals     Row Name 04/05/19 0838 04/05/19 0858       Pain/Comfort/Sleep    Pain Charting Type Pain Reassessment  --    Pain Rating (0-10): Rest 6  --    Pain Rating (0-10): Activity 6  --    Pain Management Interventions premedicated for  activity  --       Vital Signs    Pulse 75   NSR 76   NSR    Heart Rate Source Monitor Monitor    SpO2 97 % 97 %    Patient Activity At rest Walking    Oxygen Therapy None (Room air) None (Room air)    BP (!)  121/58 (!)  133/57    MAP (mmHg) 78 mmHg 82 mmHg    BP Location Other (Comment)   R arterial A-line Other (Comment)   R arterial A-line    BP Method Other (Comment)   R arterial A-line Other (Comment)   R arterial A-line    Patient Position Sitting Sitting    Row Name 04/05/19 0958          Vital Signs    Pulse --   NSR           Prior Living Environment      Most Recent Value   Lives With  spouse [wife's name: Mulugeta]   Living Arrangements  house   Living Environment Comment  2SH w/4 steps to enter w/o railing, powder room on first floor, both tub and shower stall shower on 2nd floor, bedroom on 2nd floor    Equipment Currently Used at Home  none          Prior Level of Function      Most Recent Value   Ambulation  independent   Transferring  independent   Toileting  independent   Bathing  independent   Dressing  independent   Eating  independent   Equipment Currently Used at Home  none   Prior Functional Level Comment  INd w/o AD for community distances and stairs.  Wife does most of IADl's.  Pt was working.                 PT Treatment Summary - 04/05/19 0838        Session Details    Document Type daily treatment    Mode of Treatment individual therapy;physical therapy       Time Calculation    Start Time 0838    Stop Time 0901    Time Calculation (min) 23 min       General Information    Patient Profile Reviewed? yes    Existing Precautions/Restrictions cardiac;other (see comments)   ECMO, perfusionist present during PT       Sit to Stand Transfer    Grundy, Sit to Stand Transfer close supervision       Stand to Sit Transfer    Grundy, Stand to Sit Transfer close supervision       Gait Training    Grundy, Gait close supervision    Distance in Feet 124 feet    Comment see note       Stairs  Training    Lyman, Stairs not tested       LE Seated Therapeutic Exercise    Comment see note       AM-PAC (TM) - Mobility (Current Function)    Turning from your back to your side while in a flat bed without using bedrails? 3 - A Little    Moving from lying on your back to sitting on the side of a flat bed without using bedrails? 3 - A Little    Moving to and from a bed to a chair? 3 - A Little    Standing up from a chair using your arms? 3 - A Little    To walk in a hospital room? 3 - A Little    Climbing 3-5 steps with a railing? 3 - A Little    AM-PAC (TM) Mobility Score 18       PT Clinical Impression    Plan For Care Reviewed: Physical Therapy patient voices agreement with PT plan for care;family voices agreement with PT plan for care    Rehab Potential/Prognosis good, to achieve stated therapy goals    PT Frequency of Treatment 5-7 times per week    Anticipated Equipment Needs at Discharge none    Daily Outcome Statement Pt transfering and ambulating in hallway with close supervision.  Will continue to follow progress of R heart via TTE.          Pain Assessment/Intervention  Pain Charting Type: Pain Reassessment             Education provided this session. See the Patient Education summary report for full details.      Patient educated in safe transfer, ambulation technique.  Patient demonstrated good understanding.    PT Care Plan Goals      Most Recent Value   Stair Goal, PT   PT STG: Stairs  supervision required   PT STG: Number of Stairs  4      PT Care Plan Goals      Most Recent Value   Bed Mobility Goal   Date Goal Established: Bed Mobility  04/01/19   Time to Achieve Goal: Bed Mobility  5 days   Goal Activity: Bed Mobility  all bed mobility activities   Level of Lyman Goal: Bed Mobility  independent   Gait Goal   Date Goal Established: Gait Training  04/01/19   Time to Achieve Goal: Gait Training  5 days   Level of Lyman  supervision required   Assistive Device: Gait Training   walker, rolling   Distance Goal: Gait Training (feet)  250 feet   Goal: Gait Training  amb w/least AD    Transfer Goal   Date Goal Established: Transfer Training  04/01/19   Time to Achieve Goal: Transfer Training  5 days   Goal Activity: Transfer Training  bed to chair/chair to bed, sit to stand/stand to sit   Level of Stark Goal: Transfer Training  independent

## 2019-04-05 NOTE — PROGRESS NOTES
Remains on [employer] changed yesterday due to low platelets. Doing well with PT. Anticipating d/c to home with home care. Will follow to assist with transitions of care.

## 2019-04-05 NOTE — PROGRESS NOTES
Cardiothoracic Intensivist Progress Note       CARDIOTHORACIC   Post-Operative Day # 7     Subjective     History of Present Illness: Wesley Fields is a 58 y.o. cisgender male with history of decreased exercise capacity prior to OR s/p MVR, chronic HTN, and hyperlipidemia that are being treated.     Review of Systems:                                     Constitutional: no acute distress                                                  Eyes: denies difficulty with vision  Ears, nose, mouth, throat, and face: denies oral discomfort                                        Respiratory: denies shortness of breath                                  Cardiovascular: mild chest discomfort at RVAD site                                 Gastrointestinal: denies abdominal pain                                    Genitourinary: denies difficulty voiding                                      Hematologic: denies bleeding issues                                Musculoskeletal: denies muscle pain, right hand swelling                                      Neurological: generalized weakness                                   Integumentary: denies rash    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    Social History:   Social History     Social History   • Marital status:      Spouse name: N/A   • Number of  children: 4   • Years of education: N/A     Social History Main Topics   • Smoking status: Never Smoker   • Smokeless tobacco: Never Used   • Alcohol use Yes      Comment: 2-3 beers/day   • Drug use: No   • Sexual activity: Not Asked     Other Topics Concern   • None     Social History Narrative    Patient lives with his wife in a 2 story home-bathroom on 2nd floor       Family History:   Family History   Problem Relation Age of Onset   • COPD Mother    • Multiple myeloma Mother    • Lung cancer Father    • Cancer Sister         thyroid       Objective     Vital signs in last 24 hours:  Temp:  [36.3 °C (97.4 °F)-38 °C (100.4 °F)] 36.6 °C (97.8 °F)  Heart Rate:  [69-83] 71  Resp:  [16-20] 18  BP: (110-144)/(57-69) 110/58      Intake/Output Summary (Last 24 hours) at 04/05/19 1209  Last data filed at 04/05/19 1200   Gross per 24 hour   Intake             3541 ml   Output             5050 ml   Net            -1509 ml     Intake/Output this shift:  I/O this shift:  In: 454 [P.O.:240; I.V.:114; IV Piggyback:100]  Out: 1400 [Urine:1400]    Labs  Lab Results   Component Value Date    WBC 15.88 (H) 04/05/2019    HGB 10.3 (L) 04/05/2019    HCT 31.5 (L) 04/05/2019    PLT 78 (L) 04/05/2019    ALT 41 04/05/2019    AST 44 (H) 04/05/2019     04/05/2019    K 4.3 04/05/2019     04/05/2019    CREATININE 1.0 04/05/2019    BUN 18 04/05/2019    CO2 21 (L) 04/05/2019    MG 2.4 04/05/2019    PHOS 4.4 04/05/2019    INR 1.2 04/05/2019    HGBA1C 5.6 03/29/2019    PT 14.4 04/05/2019    PTT 43 (H) 04/05/2019       Full Code    Head/Ear/Nose/Throat:     NCAT.                               Eyes:     PERRL.                     Respiratory:     diminished at the bases b/l.               Cardiovascular:     1st degree AV block, mechanical heart sounds.              Gastrointestinal:     + bowel sounds.                 Genitourinary:     No deformities.                    Extremities:     trace edema b/l lower extremities. Right hand  edema.             Musculoskeletal:      No injury or deformity.                   Neurological:     Follows commands.       Behavior/Emotional:     Cooperative.                           Lymph:      No adenopathy noted.                               Skin:      Clean, dry and intact.     Examination of the patient performed at the bedside. Rounded with ICU team and discussed management/plan with surgical staff. Medications, radiological studies and labs reviewed and discussed with attending of record, Dr. Georgi Pepe.    Cardiothoracic Assessment and Plan:    Neurologic: A&Ox3 and pain controlled. History of metastatic lung cancer to brain which has since been treated. Has been having some trouble sleeping and is upset about overall condition; melatonin for sleep and will consider anti-depression medication PRN.     Cardiovascular: Severe MR s/p MV repair now in cardiogenic shock with right heart failure, also with chronic diastolic CHF with a preserved EF and hypertension. RVAD placed intra-op due to worsening RV function. Unclear reason for acute decompensation as no CAD apparent and coronaries appeared to be de-aired thoroughly coming off bypass. Could have been  from pinching of coronary arteries at some point during manipulation of valve. Had some ST elevations but have since resolved. RVAD oxygenator has been capped however will not be explanted until TTE shows improvement of RV function. If RV function does not show improvement, will potentially need longer RV support albeit without oxygenator. Will resuscitate, monitor MVO2/CO, wean vasoactive medication and continue to optimize cardiac medications. Appreciate Cardiology input. Continue statin for dyslipidemia.     Respiratory: I personally reviewed the most recent CXR which portrayed b/l pulmonary congestion, increase in right pleural effusion and small lung volumes compared to previous exam. Will consult IR for possible drainage. Will encourage IS,  mobilization and wean O2 PRN.      Genitourinary: No gambino. Creatinine trending down. Making adequate urine output. Will avoid nephrotoxic medications. Goal for net negative fluid balance.     Endocrine: Follow FSBG.     Hematologic: No evidence active bleeding. On a heparin gtt. Acute (expected) blood loss anemia from surgery. Follow plasma free Hbg to monitor for hemolysis. Secondary thrombocytopenia.     Infectious Disease: No indication for antibiotics at this time. Metastatic CA appears to be regressing by latest CT scan. Leukocytosis is mild to moderate and will have ID evaluate if appropriate to treat. Has not been having fevers and does not appear septic.     Fluids, Electrolytes: Electrolytes replaced per protocol. Goal for net negative fluid balance.     Gastrointestinal: PO as tolerated. GI ppx. LFTs have trended down.     Skin: OOB, PT. Will remove arterial line due to right hand swelling.    Tubes, lines and drains: Will remove superfluous invasive monitors PRN.    Disposition: Critically ill.  Right heart failure with RVAD support s/p major invasive cardiac surgery. Continue ICU care.     I personally spent 35 minutes of critical care time with this patient not including procedure time.          Antonio Lafleur,   4/5/2019

## 2019-04-05 NOTE — STUDENT
Infectious Disease Consult Note    Patient Name: Wesley Fields  MR#: [id number]  : 1960  Admission Date: 3/29/2019  Consult Date: 19 9:25 AM   Consultant: Jazmin Greco    Reason for Consult: leukocytosis, +sputum  Referring Provider: Georgi Pepe      History of Present Illness     Wesley Fields is a 58 y.o. male with a past medical history notable for lung cancer (stage IV NSCLC on lorlatinib), osteonecrosis of the jaw, pulmonary HTN, HTN, HLD, and mitral valve insufficiency (diagnosed in 2018) who was admitted on 3/29/2019 for mitral valve repair. His intraoperative course was complicated by sudden decrease in RV function, necessitating intraoperative placement of RVAD and intubation (now extubated).     Mr. Fields remains on the RVAD and yesterday experienced some chills, accompanied by increased leukocytosis, fever to 100.4, and sputum + for Gram+ cocci and bacilli and G- rods. He states he felt chills yesterday but generally feels at baseline today compared to the rest of his hospitalization. He has had a cough productive of clear sputum since admission (attributed to cardiopulmonary etiology) and states his cough has not increased. Denies rhinorrhea, sore throat, myalgias, sick contacts. Denies pain at all line sites. He has been afebrile since  at 2000.     Of note, Mr. Fields's lung cancer with brain metastases was diagnosed 5 years ago and required treatment with brain radiation, which was complicated by brain necrosis. This in turn was resolved with steroids (last steroids for brain necrosis 2019).    Allergies: No Known Allergies    Medical History:   Past Medical History:   Diagnosis Date   • CHF (congestive heart failure) (CMS/HCC) (HCC)    • HTN (hypertension) 3/4/2019   • Hyperlipidemia 3/4/2019   • Metastatic lung cancer (metastasis from lung to other site) (CMS/HCC) (HCC) 3/4/2019    chemo x2, XRT   • Mitral regurgitation 3/4/2019   • Multiple thyroid nodules    •  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)    • Radiation therapy induced brain necrosis     last steroid 1/2019       Surgical History:   Past Surgical History:   Procedure Laterality Date   • CARDIAC CATHETERIZATION     • CLAVICLE SURGERY Right     biopsy   • HERNIA REPAIR         Social History:   Social History     Social History   • Marital status:      Spouse name: N/A   • Number of children: 4   • Years of education: N/A     Social History Main Topics   • Smoking status: Never Smoker   • Smokeless tobacco: Never Used   • Alcohol use Yes      Comment: 2-3 beers/day   • Drug use: No   • Sexual activity: Not Asked     Other Topics Concern   • None     Social History Narrative    Patient lives with his wife in a 2 story home-bathroom on 2nd floor          Travel Exposure:   Travel and Exposure Screening      Most Recent Value   Travel Screening   Traveled outside the [place] in the last month?  No Filed On: 03/29/2019 0603   Exposure Screening   Contact with someone with a communicable disease in the last month?  No Filed On: 03/29/2019 0603   Symptoms          Animal Exposure: none    Other Exposure: Denies sick contacts    Family History:   Family History   Problem Relation Age of Onset   • COPD Mother    • Multiple myeloma Mother    • Lung cancer Father    • Cancer Sister         thyroid       Review of Systems    All other systems reviewed and negative except as noted in the HPI.    Medications:    Current IP Meds         Frequency     ketorolac (TORADOL) injection 15 mg      Every 12 hours (6a, 6p)     mupirocin (BACTROBAN) 2 % ointment 1 application      3 times daily     bacitracin-polymyxin B (POLYSPORIN) ointment  Status:  Discontinued      2 times daily     ketorolac (TORADOL) injection 15 mg      Once     heparin infusion in D5W 100 units/mL  (heparin therapy non-protocol)      Titrated     melatonin ODT 6 mg      Nightly PRN     amiodarone (PACERONE) tablet 400 mg  (Amiodarone IV followed by  PO)      2 times daily     sodium chloride (OCEAN) 0.65 % nasal spray 2 spray      4 times daily     mupirocin (BACTROBAN) 2 % ointment 1 application  Status:  Discontinued      3 times daily     oxymetazoline (AFRIN) 0.05 % nasal spray 1 spray      2 times daily PRN     furosemide (LASIX) injection 40 mg      2 times daily (9a, 4p)     melatonin ODT 3 mg  Status:  Discontinued      Nightly PRN     sildenafil (antihypertensive) (REVATIO) tablet 20 mg      3 times daily     sodium chloride flush 10 mL  (PICC / Central Line Care and Maintenance)      Every 8 hours interval     magnesium oxide (MAG-OX) tablet 400 mg      2 times daily     sodium chloride flush 10 mL  (PICC / Central Line Care and Maintenance)      As needed     acetylcysteine (MUCOMYST) 200 mg/mL (20 %) solution 600 mg  Status:  Discontinued      2 times daily (6a, 6p)     ipratropium-albuterol (DUO-NEB) 0.5-2.5 mg/3 mL nebulizer solution 3 mL      Every 6 hours     guaiFENesin (MUCINEX) 12 hr ER tablet 1,200 mg      2 times daily     insulin aspart U-100 (NovoLOG) pen 3 Units  Status:  Discontinued      3 times daily with meals     oxyCODONE (ROXICODONE) immediate release tablet 10 mg      Every 6 hours PRN     midazolam (VERSED) 1 mg/mL injection 2 mg      Every 4 hours PRN     lorlatinib tablet 75 mg      Daily     oxyCODONE (ROXICODONE) immediate release tablet 5 mg      Every 4 hours PRN     HYDROmorphone (DILAUDID) 1 mg/mL injection 0.5 mg      Every 1 hour PRN     folic acid (FOLVITE) tablet 1 mg      Daily     thiamine (VITAMIN B1) tablet 100 mg      Daily     acetaminophen (TYLENOL) tablet 975 mg      Every 6 hours     heparin infusion in D5W 100 units/mL  (heparin therapy non-protocol)  Status:  Discontinued      Titrated     chlorhexidine (PERIDEX) 0.12 % mouthwash 15 mL  (Ventilator/VAP Bundle)      2 times per day     docusate sodium (COLACE) capsule 100 mg  (Constipation)      2 times daily     famotidine (PEPCID) tablet 20 mg   (famotidine IV or ORAL)      2 times daily     senna (SENOKOT) tablet 1 tablet      2 times daily     potassium chloride (KLOR-CON) tablet extended release 20 mEq      2 times daily     vasopressin (PITRESSIN) 20 Units in sodium chloride 0.9 % 100 mL (0.2 Units/mL) infusion  Status:  Discontinued      Continuous     sodium chloride 0.9 % infusion      Continuous     aspirin enteric coated tablet 81 mg  (clopidogrel with aspirin 81 mg)      Daily     multivitamin tablet 1 tablet      Daily     insulin regular (HumuLIN R,NovoLIN R) 100 Units in sodium chloride 0.9 % 100 mL (1 Units/mL) infusion  (Insulin Calculator)  Status:  Discontinued      Titrated     colchicine (COLCRYS) tablet 0.6 mg      Daily     DOBUTamine (DOBUTREX) infusion 500 mg/250 mL D5W (2,000 mcg/mL)  Status:  Discontinued      Continuous     rosuvastatin (CRESTOR) tablet 10 mg      Daily (6p)     ondansetron ODT (ZOFRAN-ODT) disintegrating tablet 4 mg  (Nausea/Vomiting)      Every 8 hours PRN     ondansetron (ZOFRAN) injection 4 mg  (Nausea/Vomiting)      Every 8 hours PRN     diphenhydrAMINE (BENADRYL) capsule 25 mg  (Itching/Pruritis)      Every 6 hours PRN     diphenhydrAMINE (BENADRYL) injection 25 mg  (Itching/Pruritis)      Every 6 hours PRN     dextrose in water injection 5-15 g  (Insulin Calculator)      As needed     senna (SENOKOT) tablet 1 tablet  (Constipation)      2 times daily PRN     potassium chloride 20 mEq in 100 mL IVPB  (premix)      Every 8 hours PRN     sodium bicarbonate 8.4 % (1 mEq/mL) injection 45-90 mEq      As needed     magnesium sulfate IVPB 2g in 50 mL NSS/D5W/SWFI      As needed     albumin human 5 % solution 500 mL      As needed     alum-mag hydroxide-simeth (MAALOX) 200-200-20 mg/5 mL suspension 30 mL  (Indigestion)      Every 4 hours PRN     bisacodyl (DULCOLAX) 10 mg suppository 10 mg  (Constipation)      Daily PRN     calcium chloride 1 g in sodium chloride 0.9 % 50 mL IVPB      Every 6 hours PRN     atropine  injection 0.5 mg      Every 5 min PRN          Anti-infectives     Start     Dose/Rate Route Frequency Ordered Stop    04/05/19 0930  mupirocin (BACTROBAN) 2 % ointment 1 application      1 application Topical 3 times daily 04/05/19 0831              Objective     Vital Signs:    Patient Vitals for the past 72 hrs:   BP Temp Temp src Pulse Resp SpO2 Height Weight   04/05/19 0900 - - - 74 - - - -   04/05/19 0845 - - - 76 - - - -   04/05/19 0800 - - - 71 - - - -   04/05/19 0731 - - - 69 - - - -   04/05/19 0720 - 36.3 °C (97.4 °F) Temporal 74 18 97 % - -   04/05/19 0700 - - - 70 20 - - -   04/05/19 0600 - - - 71 20 97 % - -   04/05/19 0500 - - - 74 19 97 % - -   04/05/19 0400 - 37.3 °C (99.1 °F) Temporal 73 18 - - -   04/05/19 0300 - - - 73 17 - - -   04/05/19 0200 - - - 76 18 - - -   04/05/19 0100 - - - 80 19 96 % - 117 kg (258 lb 13.1 oz)   04/05/19 0000 - 37.3 °C (99.1 °F) Temporal 78 18 - - -   04/04/19 2300 - - - 77 16 96 % - -   04/04/19 2200 - 37.4 °C (99.4 °F) Temporal 80 - 96 % - -   04/04/19 2100 - - - 83 - - - -   04/04/19 2043 - - - 79 - - - -   04/04/19 2031 - - - 79 - - - -   04/04/19 2015 - 37.9 °C (100.2 °F) Temporal 80 - 95 % - -   04/04/19 2000 - 38 °C (100.4 °F) Core 81 - 96 % - -   04/04/19 1920 - 38 °C (100.4 °F) Core 79 - - - -   04/04/19 1905 - 37.9 °C (100.2 °F) Core 80 - - - -   04/04/19 1900 - 37.8 °C (100 °F) Core 79 - 98 % - -   04/04/19 1830 - - - 75 - - - -   04/04/19 1800 - 37.7 °C (99.9 °F) Core 75 - - - -   04/04/19 1725 - - - 79 - - - -   04/04/19 1700 - - - 79 - - - -   04/04/19 1600 - - - 79 - - - -   04/04/19 1535 (!) 144/69 - - 74 - - 1.829 m (6') 113 kg (249 lb)   04/04/19 1500 - - - 74 16 98 % - -   04/04/19 1425 - - - 75 - 96 % - -   04/04/19 1415 - - - 79 - - - -   04/04/19 1400 - - - 74 20 97 % - -   04/04/19 1310 - - - 74 - 97 % - -   04/04/19 1300 - - - 74 - 98 % - -   04/04/19 1200 - - - 74 18 98 % - -   04/04/19 1153 - - - 73 - 97 % - -   04/04/19 1115 - - - 75 - 96 % - -    04/04/19 1100 - - - 75 - 98 % - -   04/04/19 1000 - - - 78 - 97 % - -   04/04/19 0941 (!) 144/69 - - - - - - -   04/04/19 0900 - - - 73 18 100 % - -   04/04/19 0800 - - - 68 18 96 % - -   04/04/19 0750 - 36.8 °C (98.3 °F) Temporal 67 18 95 % - -   04/04/19 0700 - - - 68 18 96 % - -   04/04/19 0600 - - - 68 - 95 % - -   04/04/19 0500 - - - 64 - 95 % - -   04/04/19 0400 - - - 70 - 95 % - -   04/04/19 0347 - 36.7 °C (98.1 °F) Temporal 68 - 95 % - -   04/04/19 0300 - - - 76 - 95 % - -   04/04/19 0200 - - - 68 - 94 % - -   04/04/19 0100 - - - 69 - 95 % - -   04/04/19 0000 - - - 69 - 93 % - -   04/03/19 2349 - 37.2 °C (98.9 °F) Temporal 69 - 94 % - -   04/03/19 2300 - - - 65 - 95 % - -   04/03/19 2200 - - - 71 - 93 % - -   04/03/19 2100 - - - 70 - 94 % - -   04/03/19 2030 - - - 74 - - - -   04/03/19 2000 - 36.8 °C (98.3 °F) Temporal 68 - 95 % - -   04/03/19 1900 - - - 71 - - - -   04/03/19 1800 - - - 72 - - - -   04/03/19 1700 - - - 69 - - - -   04/03/19 1600 - - - 67 - - - -   04/03/19 1500 - - - 68 - - - -   04/03/19 1400 - - - 70 - - - -   04/03/19 1300 - - - 68 - - - -   04/03/19 1200 - - - 64 - - - -   04/03/19 1100 - - - 66 - 94 % - -   04/03/19 1000 - - - 68 - 93 % - -   04/03/19 0900 - - - 66 - - - -   04/03/19 0845 (!) 167/59 - - 66 - 96 % - -   04/03/19 0830 (!) 148/59 - - 62 - 97 % - -   04/03/19 0800 - (!) 36 °C (96.8 °F) Temporal 63 - - - -   04/03/19 0700 - - - 63 - - - -   04/03/19 0600 - - - 64 - 95 % - -   04/03/19 0531 - - - 63 - 99 % - -   04/03/19 0515 - - - 63 18 100 % - -   04/03/19 0500 - 36.8 °C (98.2 °F) Temporal 63 - - - -   04/03/19 0400 - - - 62 20 96 % - -   04/03/19 0300 - - - 61 18 98 % - -   04/03/19 0200 - - - 61 18 97 % - -   04/03/19 0100 - - - 60 18 97 % - -   04/03/19 0000 - 36.7 °C (98 °F) Temporal 62 20 98 % - -   04/02/19 2314 - - - 63 - - - -   04/02/19 2300 - - - 62 18 97 % - -   04/02/19 2200 - - - 65 18 99 % - -   04/02/19 2100 - - - 70 (!) 22 98 % - -   04/02/19 2000 - - - 65  - 97 % - -   19 1945 - 36.8 °C (98.3 °F) Temporal 66 18 96 % - -   19 1900 - - - 63 - 98 % - -   19 1800 - - - 65 20 94 % - -   19 1700 - - - 62 19 100 % - -   19 1600 - - - 61 19 100 % - -   19 1500 - - - 60 19 100 % - -   19 1400 - - - 62 19 99 % - -   19 1330 (!) 170/55 - - 65 - 95 % - -   19 1315 (!) 168/65 - - 61 - 99 % - -   19 1300 - - - 60 19 99 % - -   19 1230 - - - 60 - - - -   19 1200 - - - (!) 58 19 100 % - -   19 1133 (!) 150/70 - - 61 - - 1.829 m (6') 113 kg (249 lb)   19 1130 - - - 61 - 100 % - -   19 1111 - - - 60 - 99 % - -   19 1100 - - - 60 - 100 % - -   19 1000 - - - 60 18 100 % - -       Temp (72hrs), Av.2 °C (98.9 °F), Min:36 °C (96.8 °F), Max:38 °C (100.4 °F)      Physical Exam:    BP (!) 144/69   Pulse 74   Temp 36.3 °C (97.4 °F) (Temporal)   Resp 18   Ht 1.829 m (6')   Wt 117 kg (258 lb 13.1 oz)   SpO2 97%   BMI 35.10 kg/m²     General: in NAD, conversant  HEENT: atraumatic, anicteric  Neck: No JVD, no thyromegaly  Cardio: RRR, no murmurs appreciated  Pulm: Decreased breath sounds at R base  Abd: soft and nontender  Skin: no rashes apparent; all line sites clean and dressed  Extremities: some edema in R hand at IV site; no other abnormalities  Psych: normal affect and behavior  Neuro: grossly intact      Lines, Drains, Airways, Wounds:  PICC Triple Lumen 19 Left (Active)   Number of days: 3       Arterial Line 19 Right Radial (Active)   Number of days: 7       Assist Device Line 19 Right (Active)   Number of days: 7       Surgical Incision Groin Upper (Active)   Number of days: 7       Surgical Incision Chest Right (Active)   Number of days: 7       Labs:    CBC Results       19                    0424 2335 1803         WBC 14.32 (H) 14.97 (H) 13.70 (H)         RBC 3.24 (L) 3.39 (L) 3.84 (L)         HGB 9.4 (L) 9.8 (L) 10.9 (L)         HCT  28.5 (L) 29.7 (L) 33.6 (L)         MCV 88.0 87.6 87.5         MCH 29.0 28.9 28.4         MCHC 33.0 33.0 32.4         PLT 74 (L) 76 (L) 64 (L)         Comment for PLT at 0424 on 04/05/19:  CONSISTENT WITH PREVIOUS RESULTS    Comment for PLT at 2335 on 04/04/19:  CONSISTENT WITH PREVIOUS RESULTS    Comment for PLT at 1803 on 04/04/19:  CONSISTENT WITH PREVIOUS RESULTS      BMP Results       04/05/19 04/04/19 04/04/19 0424 2335 1804          - -         K 4.1 4.0 4.0         Cl 105 - -         CO2 21 (L) - -         Glucose 134 (H) - -         BUN 18 - -         Creatinine 1.0 - -         Calcium 8.2 (L) - -         Anion Gap 10 - -         EGFR &gt;60.0 - -                   PT/PTT Results       04/05/19 04/04/19 04/04/19 0424 2335 1803         PT 14.4 - -         INR 1.2 - -         PTT 46 (H) 46 (H) 52 (H)         Comment for INR at 0424 on 04/05/19:    INR has no defined significance when PT is within Reference Range.    Comment for PTT at 0424 on 04/05/19:    The Standard Therapeutic Range for Heparin is 68 to 101 seconds.    Comment for PTT at 2335 on 04/04/19:    The Standard Therapeutic Range for Heparin is 68 to 101 seconds.    Comment for PTT at 1803 on 04/04/19:    The Standard Therapeutic Range for Heparin is 68 to 101 seconds.      UA Results       03/20/19                          1137           Color Yellow           Clarity Clear           Glucose Negative           Bilirubin Negative           Ketones Negative           Sp Grav 1.011           Blood Negative           Ph 6.5           Protein Negative           Urobilinogen 0.2           Nitrite Negative           Leuk Est Negative           Comment for Blood at 1137 on 03/20/19:    The sensitivity of the occult blood test is equivalent to approximately 4 intact RBC/HPF.    Comment for Leuk Est at 1137 on 03/20/19:    Results can be falsely negative due to high specific gravity, some antibiotics, glucose >3  g/dl, or WBC other than neutrophils.      Lactate Results     No lab values to display.          Microbiology Results     Procedure Component Value Units Date/Time    Sputum Gram Stain (Lab Only) Expectorated Sputum [[id number]] Collected:  04/05/19 0108    Specimen:  Sputum from Expectorated Sputum Updated:  04/05/19 0557     Gram Stain Result 1+ WBC      1+ Epithelial cells      2+ Gram negative bacilli      2+ Gram positive cocci in pairs      2+ Gram positive bacilli    MRSA screen culture (Outpatient and PAT Only) [[id number]]  (Normal) Collected:  03/20/19 1137    Specimen:  Nasal Swab from Nose Updated:  03/22/19 0646     Culture No Staphylococcus Aureus Recovered          Pathology Results     ** No results found for the last 720 hours. **          Echo:     Cardiac Imaging   TRANSTHORACIC ECHO (TTE) LIMITED 04/04/2019    Narrative Technically limited study.  Limited study for right ventricular function.    1. Mild concentric left ventricular hypertrophy with normal cavity size   and preserved systolic function.  Abnormal septal motion.  Unable to fully   assess regional wall motion abnormalities given limited study.  2. Moderately dilated right ventricular size with severely reduced   systolic function (TAPSE 1.11 cm).  Although right ventricular function   appears severely reduced, it appears somewhat improved from previous study   on 4/2/2019. Protek-Duo Catheter is visualized in the right-sided cardiac   structures.  3. Grossly normal aortic valve.   4. Thickened mitral leaflets. Status post mitral valve repair with   annuloplasty ring. Mild mitral regurgitation.  5. Mildly dilated left atrium.  6. Mild tricuspid regurgitation with insufficient jet to determine RVSP.  7. Mild pulmonic regurgitation.  8. Small pericardial effusion.  9. Compared to previous study dated 4/2/2019, right ventricular function   appears slightly improved.              Imaging:    Radiology Imaging   XR CHEST 1 VW    Narrative  CLINICAL HISTORY: Congestive heart failure    COMMENT: Single AP erect view the chest was obtained portably on April 4 2019 x  30 7:00 AM.  Comparison is with infiltrate.    Cardiomegaly stable.  Mediastinal silhouette is stable.  Large bore catheters  coursing via the right subclavian vein terminating at the cavoatrial junction  and the pulmonary outflow tract appear unchanged in position.  A PICC line  coursing via the left upper extremity is looped upon itself terminating within  the brachiocephalic vein.  Right basilar consolidation and small right or left  pleural effusions are stable.  Mild pulmonary vascular congestion does not  appear significantly changed.  There is no appreciable pneumothorax.    Results regarding the PICC line were called to JEAN-CLAUDE Waite RN caring for the  patient on 4/4/2019 at 11:00 AM.      Impression IMPRESSION: Stable appearance of the chest as described above.  Malpositioned  PICC line which is looped upon itself terminating within the brachiocephalic  vein.       Assessment     57 YO M with medical history notable for stage IV lung cancer (with brain mets, treated with radiation leading to brain necrosis, now resolved with steroids), osteonecrosis of jaw, mitral valve insufficiency, admitted for MVR 3/29, which was complicated by RV failure necessitating RVAD placement, presents with increasing leukocytosis and +sputum culture following episode of fever yesterday evening.        Plan     Leukocytosis  -Gradually rising white count from 10.8 4/4 at 6AM -> 12.5 -> 13.7 -> 15 peak -> 14.3 most recently  -Sputum+ for G- bacilli, G+ cocci and bacilli  -Febrile to 100.4 4/4 from 1920 to 2000, afebrile since  -4/4 Chest CXR demonstrated R basilar consolidation, consistent with exam findings of decreased breath sounds - unclear if infectious etiology (given white count, brief fever, decreased breath sounds on R, + sputum Gram stain) vs. baseline pulmonary vascular congestion.  -No antibiotics  given  -Await sputum culture, today's chest XR to determine if antibiotics are necessary  -Trend white count and any additional fevers

## 2019-04-05 NOTE — NURSING NOTE
BECKY DARNELL notified @ 1915 for dropping fibrinogen and platelet counts.  The decision was made that the circuit would be changed tonight.  Dr Victoria and ANGE Dietrich form perfusion @ [facility].  Circuit changed to RoTo flow without difficulty.  Pts BP tolerated procedure well, no change in HR with cool fluid introduced form new circuit.

## 2019-04-05 NOTE — PROGRESS NOTES
Cardiology Progress Note    SUBJECTIVE  Patient is feeling well. He denies shortness of breath, chest discomfort, and palpitations.    Inpatient medications:  •  acetaminophen, 975 mg, oral, q6h RIGOBERTO  •  albumin human, 500 mL, intravenous, PRN  •  alum-mag hydroxide-simeth, 30 mL, oral, q4h PRN  •  amiodarone, 400 mg, oral, BID  •  aspirin, 81 mg, oral, Daily  •  atropine, 0.5 mg, intravenous, q5 min PRN  •  bisacodyl, 10 mg, rectal, Daily PRN  •  calcium chloride, 1 g, intravenous, q6h PRN  •  chlorhexidine, 15 mL, Mouth/Throat, 2 times per day  •  colchicine, 0.6 mg, oral, Daily  •  [DISCONTINUED] insulin, 0-15 Units/hr, intravenous, Titrated **AND** dextrose in water, 10-30 mL, intravenous, PRN  •  diphenhydrAMINE, 25 mg, oral, q6h PRN **OR** diphenhydrAMINE, 25 mg, intravenous, q6h PRN  •  docusate sodium, 100 mg, oral, BID  •  [DISCONTINUED] famotidine, 20 mg, intravenous, BID **OR** famotidine, 20 mg, oral, BID  •  folic acid, 1 mg, oral, Daily  •  [START ON 4/6/2019] furosemide, 40 mg, intravenous, Daily  •  guaiFENesin, 1,200 mg, oral, BID  •  heparin, 1,400 Units/hr, intravenous, Titrated  •  HYDROmorphone, 0.5 mg, intravenous, q1h PRN  •  ipratropium-albuterol, 3 mL, nebulization, q6h RIGOBERTO  •  ketorolac, 15 mg, intravenous, q12h (6a, 6p)  •  lorlatinib, 75 mg, oral, Daily  •  magnesium oxide, 400 mg, oral, BID  •  magnesium sulfate, 2 g, intravenous, PRN  •  melatonin, 6 mg, oral, Nightly PRN  •  midazolam, 2 mg, intravenous, q4h PRN  •  multivitamin, 1 tablet, oral, Daily  •  mupirocin, 1 application, Topical, TID  •  ondansetron ODT, 4 mg, oral, q8h PRN **OR** ondansetron, 4 mg, intravenous, q8h PRN  •  oxyCODONE, 10 mg, oral, q6h PRN  •  oxyCODONE, 5 mg, oral, q4h PRN  •  oxymetazoline, 1 spray, Each Nostril, 2x daily PRN  •  potassium chloride, 20 mEq, oral, BID  •  potassium chloride, 20 mEq, intravenous, q8h PRN  •  rosuvastatin, 10 mg, oral, Daily (6p)  •  senna, 1 tablet, oral, 2x daily PRN  •   senna, 1 tablet, oral, BID  •  sildenafil (antihypertensive), 20 mg, oral, TID  •  sodium bicarbonate, 45-90 mEq, intravenous, PRN  •  sodium chloride, 2 spray, Each Nostril, QID  •  sodium chloride 0.9 %, , intravenous, Continuous  •  sodium chloride, 10 mL, intravenous, q8h INT  •  sodium chloride, 10 mL, intravenous, PRN  •  thiamine, 100 mg, oral, Daily    Review of Systems: Review of systems otherwise normal.    OBJECTIVE:   /66   Pulse 74   Temp 36.8 °C (98.2 °F) (Temporal)   Resp 18   Ht 1.829 m (6')   Wt 117 kg (258 lb 13.1 oz)   SpO2 97%   BMI 35.10 kg/m²       Intake/Output Summary (Last 24 hours) at 04/05/19 1840  Last data filed at 04/05/19 1742   Gross per 24 hour   Intake             3644 ml   Output             3725 ml   Net              -81 ml       Physical Exam   Constitutional: Well-developed and well-nourished. No distress.   HENT: Normocephalic and atraumatic. Moist mucous membranes.  Eyes: Sclera anicteric.  Cardiovascular: Normal rate and regular rhythm.   Pulmonary/Chest: Normal effort and air entry.   Abdominal: Soft, non tender, no distension.   Musculoskeletal: +1 LE edema  Neurological: Alert and oriented to person, place, and time.   Skin: Skin is warm and dry.   Psychiatric: Normal mood, affect and behavior.       Labs    Results from last 7 days  Lab Units 04/05/19  1003 04/05/19  0424 04/04/19  2335  04/04/19  0606  04/03/19  0518   SODIUM mEQ/L  --  136  --   --  139  --  138   POTASSIUM mEQ/L 4.3 4.1 4.0  < > 4.3  < > 4.4   CHLORIDE mEQ/L  --  105  --   --  108  --  109   CO2 mEQ/L  --  21*  --   --  21*  --  21*   BUN mg/dL  --  18  --   --  20  --  22*   CREATININE mg/dL  --  1.0  --   --  1.1  --  1.0   CALCIUM mg/dL  --  8.2*  --   --  8.6*  --  8.4*   ALBUMIN g/dL  --  2.5*  --   --  2.5*  --  2.5*   BILIRUBIN TOTAL mg/dL  --  0.9  --   --  0.8  --  0.7   ALK PHOS IU/L  --  65  --   --  56  --  58   ALT IU/L  --  41  --   --  44  --  49   AST IU/L  --  44*  --   --   65*  --  92*   GLUCOSE mg/dL  --  134*  --   --  114*  --  109*   < > = values in this interval not displayed.    Results from last 7 days  Lab Units 04/05/19  1708 04/05/19  1003 04/05/19  0424   WBC K/uL 14.14* 15.88* 14.32*   HEMOGLOBIN g/dL 9.6* 10.3* 9.4*   HEMATOCRIT % 29.2* 31.5* 28.5*   PLATELETS K/uL 81* 78* 74*             Cardiology results    TTE:   Cardiac Imaging   TRANSTHORACIC ECHO (TTE) LIMITED 04/04/2019    Narrative Technically limited study.  Limited study for right ventricular function.    1. Mild concentric left ventricular hypertrophy with normal cavity size   and preserved systolic function.  Abnormal septal motion.  Unable to fully   assess regional wall motion abnormalities given limited study.  2. Moderately dilated right ventricular size with severely reduced   systolic function (TAPSE 1.11 cm).  Although right ventricular function   appears severely reduced, it appears somewhat improved from previous study   on 4/2/2019. Protek-Duo Catheter is visualized in the right-sided cardiac   structures.  3. Grossly normal aortic valve.   4. Thickened mitral leaflets. Status post mitral valve repair with   annuloplasty ring. Mild mitral regurgitation.  5. Mildly dilated left atrium.  6. Mild tricuspid regurgitation with insufficient jet to determine RVSP.  7. Mild pulmonic regurgitation.  8. Small pericardial effusion.  9. Compared to previous study dated 4/2/2019, right ventricular function   appears slightly improved.          TRANSTHORACIC ECHO (TTE) LIMITED 04/02/2019    Narrative Limited study for RVAD wean  1. Mild concentric left ventricular hypertrophy with normal cavity size   and preserved systolic function. Cannot adequately assess regional wall   motion abnormalities. Abnormal septal motion.  2. Moderately dilated right ventricular size with severely decreased   systolic function (TAPSE 0.75-0.8cm).  The right ventricular outflow tract   however displays some contraction.  Yonny  sign is also present.    Protek-Duo Catheter is visualized in the right-sided cardiac structures.   During wean of RVAD there is a slight increase in right ventricular size   without appreciable change in RV function by TAPSE.   3. Grossly normal aortic valve.   4. Thickened mitral leaflets. Status post mitral valve repair with   annuloplasty ring. No obvious mitral regurgitation on the limited views   obtained.  5. Mildly dilated left atrium.  6. Mild tricuspid regurgitation.  7. Trace posterior pericardial effusion.         Imaging: Imaging reviewed.    ASSESSMENT AND PLAN  58 y.o. male admitted for Mitral valve insufficiency, unspecified etiology [I34.0]  Mitral regurgitation [I34.0], Cardiology consulted for:    Pulmonary hypertension (CMS/HCC) ([facility])   Assessment & Plan    Patient known to have mild pulmonary hypertension based on RHC prior to admission (mean PA 28 mmHg and PWCP of 14 mmHg). His hospital course has been complicated by RV failure requiring mechanical support. Continue Sildenafil 20 mg TID to help reduce RV afterload. Current plan is to recheck echocardiogram in a few days to see if improvement in RV function before discontinuation of RVAD support.        Right ventricular dysfunction   Assessment & Plan    Management per CT surgery with continued mechanical support.         * Mitral valve insufficiency   Assessment & Plan    He is status post mitral valve repair. Management per CT surgery.             Za Norman,   4/5/2019  6:40 PM

## 2019-04-05 NOTE — PLAN OF CARE
Problem: Cardiac Surgery (Adult)  Goal: Signs and Symptoms of Listed Potential Problems Will be Absent, Minimized or Managed (Cardiac Surgery)  Outcome: Ongoing (interventions implemented as appropriate)   04/05/19 0123   Cardiac Surgery   Problems Assessed (Cardiac Surgery) all   Problems Present (Cardiac Surgery) none

## 2019-04-05 NOTE — PLAN OF CARE
Problem: Patient Care Overview  Goal: Plan of Care Review  Outcome: Ongoing (interventions implemented as appropriate)   04/05/19 1048   Coping/Psychosocial   Plan Of Care Reviewed With patient   Plan of Care Review   Progress progress toward functional goals as expected   Outcome Summary Pt progressing toward goals set by PT in POC     Goal: Individualization & Mutuality  Outcome: Ongoing (interventions implemented as appropriate)      Problem: Cardiac Surgery (Adult)  Goal: Signs and Symptoms of Listed Potential Problems Will be Absent, Minimized or Managed (Cardiac Surgery)  Outcome: Ongoing (interventions implemented as appropriate)

## 2019-04-06 ENCOUNTER — APPOINTMENT (INPATIENT)
Dept: RADIOLOGY | Facility: HOSPITAL | Age: 59
DRG: 003 | End: 2019-04-06
Attending: CLINICAL NURSE SPECIALIST
Payer: COMMERCIAL

## 2019-04-06 LAB
ABO + RH BLD: NORMAL
ALBUMIN SERPL-MCNC: 2.8 G/DL (ref 3.4–5)
ALP SERPL-CCNC: 72 IU/L (ref 35–126)
ALT SERPL-CCNC: 46 IU/L (ref 16–63)
ANION GAP SERPL CALC-SCNC: 11 MEQ/L (ref 3–15)
APTT PPP: 35 SEC (ref 23–35)
APTT PPP: 41 SEC (ref 23–35)
APTT PPP: 43 SEC (ref 23–35)
APTT PPP: 43 SEC (ref 23–35)
AST SERPL-CCNC: 45 IU/L (ref 15–41)
BILIRUB SERPL-MCNC: 0.8 MG/DL (ref 0.3–1.2)
BLD GP AB SCN SERPL QL: NEGATIVE
BUN SERPL-MCNC: 16 MG/DL (ref 8–20)
CALCIUM SERPL-MCNC: 8.2 MG/DL (ref 8.9–10.3)
CHLORIDE SERPL-SCNC: 107 MEQ/L (ref 98–109)
CO2 SERPL-SCNC: 20 MEQ/L (ref 22–32)
CREAT SERPL-MCNC: 1.1 MG/DL
CROSSMATCH: NORMAL
CROSSMATCH: NORMAL
D AG BLD QL: POSITIVE
ERYTHROCYTE [DISTWIDTH] IN BLOOD BY AUTOMATED COUNT: 15.1 % (ref 11.6–14.4)
ERYTHROCYTE [DISTWIDTH] IN BLOOD BY AUTOMATED COUNT: 15.2 % (ref 11.6–14.4)
ERYTHROCYTE [DISTWIDTH] IN BLOOD BY AUTOMATED COUNT: 15.2 % (ref 11.6–14.4)
ERYTHROCYTE [DISTWIDTH] IN BLOOD BY AUTOMATED COUNT: 15.5 % (ref 11.6–14.4)
FIBRINOGEN PPP-MCNC: 463 MG/DL (ref 220–480)
FIBRINOGEN PPP-MCNC: 466 MG/DL (ref 220–480)
FIBRINOGEN PPP-MCNC: 493 MG/DL (ref 220–480)
FIBRINOGEN PPP-MCNC: 607 MG/DL (ref 220–480)
GFR SERPL CREATININE-BSD FRML MDRD: >60 ML/MIN/1.73M*2
GLUCOSE SERPL-MCNC: 121 MG/DL (ref 70–99)
HCT VFR BLDCO AUTO: 27.7 %
HCT VFR BLDCO AUTO: 28.4 %
HCT VFR BLDCO AUTO: 29.5 %
HCT VFR BLDCO AUTO: 31.7 %
HGB BLD-MCNC: 10.5 G/DL
HGB BLD-MCNC: 8.7 G/DL
HGB BLD-MCNC: 9.3 G/DL
HGB BLD-MCNC: 9.6 G/DL
HGB FREE PLAS-MCNC: <30 MG/DL
HGB FREE PLAS-MCNC: <30 MG/DL
INR PPP: 1.1 INR
ISBT CODE: 5100
ISBT CODE: 5100
ISBT CODE: 600
LABORATORY COMMENT REPORT: NORMAL
MAGNESIUM SERPL-MCNC: 2.2 MG/DL (ref 1.8–2.5)
MCH RBC QN AUTO: 28.2 PG (ref 28–33.2)
MCH RBC QN AUTO: 28.8 PG (ref 28–33.2)
MCH RBC QN AUTO: 28.9 PG (ref 28–33.2)
MCH RBC QN AUTO: 29.2 PG (ref 28–33.2)
MCHC RBC AUTO-ENTMCNC: 31.4 G/DL (ref 32.2–36.5)
MCHC RBC AUTO-ENTMCNC: 32.5 G/DL (ref 32.2–36.5)
MCHC RBC AUTO-ENTMCNC: 32.7 G/DL (ref 32.2–36.5)
MCHC RBC AUTO-ENTMCNC: 33.1 G/DL (ref 32.2–36.5)
MCV RBC AUTO: 87.3 FL (ref 83–98)
MCV RBC AUTO: 88.6 FL (ref 83–98)
MCV RBC AUTO: 89.3 FL (ref 83–98)
MCV RBC AUTO: 89.6 FL (ref 83–98)
PDW BLD AUTO: 10.6 FL (ref 9.4–12.4)
PDW BLD AUTO: 10.6 FL (ref 9.4–12.4)
PDW BLD AUTO: 10.8 FL (ref 9.4–12.4)
PDW BLD AUTO: 11 FL (ref 9.4–12.4)
PHOSPHATE SERPL-MCNC: 4.6 MG/DL (ref 2.4–4.7)
PLATELET # BLD AUTO: 113 K/UL
PLATELET # BLD AUTO: 86 K/UL
PLATELET # BLD AUTO: 87 K/UL
PLATELET # BLD AUTO: 97 K/UL
POTASSIUM SERPL-SCNC: 4.3 MEQ/L (ref 3.6–5.1)
PRODUCT CODE: NORMAL
PRODUCT STATUS: NORMAL
PROT SERPL-MCNC: 4.8 G/DL (ref 6–8.2)
PROTHROMBIN TIME: 13.5 SEC (ref 12.2–14.5)
RBC # BLD AUTO: 3.09 M/UL (ref 4.5–5.8)
RBC # BLD AUTO: 3.18 M/UL (ref 4.5–5.8)
RBC # BLD AUTO: 3.33 M/UL (ref 4.5–5.8)
RBC # BLD AUTO: 3.63 M/UL (ref 4.5–5.8)
SAO2 % BLDV: 52 % (ref 30–60)
SAO2 % BLDV: 61 % (ref 30–60)
SAO2 % BLDV: 65.4 % (ref 30–60)
SODIUM SERPL-SCNC: 138 MEQ/L (ref 136–144)
SPECIMEN EXP DATE BLD: NORMAL
UNIT ABO: NORMAL
UNIT ID: NORMAL
UNIT RH: NEGATIVE
UNIT RH: POSITIVE
UNIT RH: POSITIVE
WBC # BLD AUTO: 13.85 K/UL
WBC # BLD AUTO: 14.14 K/UL
WBC # BLD AUTO: 14.55 K/UL
WBC # BLD AUTO: 15.86 K/UL

## 2019-04-06 PROCEDURE — 85384 FIBRINOGEN ACTIVITY: CPT | Performed by: CLINICAL NURSE SPECIALIST

## 2019-04-06 PROCEDURE — 85730 THROMBOPLASTIN TIME PARTIAL: CPT | Performed by: NURSE PRACTITIONER

## 2019-04-06 PROCEDURE — 63700000 HC SELF-ADMINISTRABLE DRUG: Performed by: PHYSICIAN ASSISTANT

## 2019-04-06 PROCEDURE — 48000013 HC VENTRICULAR ASSIST, ECMO HOURLY

## 2019-04-06 PROCEDURE — 20000000 HC ROOM AND CARE ICU

## 2019-04-06 PROCEDURE — 99291 CRITICAL CARE FIRST HOUR: CPT | Mod: 25 | Performed by: ANESTHESIOLOGY

## 2019-04-06 PROCEDURE — 82810 BLOOD GASES O2 SAT ONLY: CPT | Performed by: CLINICAL NURSE SPECIALIST

## 2019-04-06 PROCEDURE — 94640 AIRWAY INHALATION TREATMENT: CPT

## 2019-04-06 PROCEDURE — 63600000 HC DRUGS/DETAIL CODE: Performed by: CLINICAL NURSE SPECIALIST

## 2019-04-06 PROCEDURE — 71045 X-RAY EXAM CHEST 1 VIEW: CPT

## 2019-04-06 PROCEDURE — 83735 ASSAY OF MAGNESIUM: CPT | Performed by: PHYSICIAN ASSISTANT

## 2019-04-06 PROCEDURE — 83051 HEMOGLOBIN PLASMA: CPT | Performed by: CLINICAL NURSE SPECIALIST

## 2019-04-06 PROCEDURE — 63700000 HC SELF-ADMINISTRABLE DRUG: Performed by: NURSE PRACTITIONER

## 2019-04-06 PROCEDURE — 86901 BLOOD TYPING SEROLOGIC RH(D): CPT

## 2019-04-06 PROCEDURE — 85027 COMPLETE CBC AUTOMATED: CPT | Performed by: CLINICAL NURSE SPECIALIST

## 2019-04-06 PROCEDURE — 86920 COMPATIBILITY TEST SPIN: CPT

## 2019-04-06 PROCEDURE — 63600000 HC DRUGS/DETAIL CODE: Performed by: PHYSICIAN ASSISTANT

## 2019-04-06 PROCEDURE — 25000000 HC PHARMACY GENERAL: Performed by: PHYSICIAN ASSISTANT

## 2019-04-06 PROCEDURE — 85610 PROTHROMBIN TIME: CPT | Performed by: CLINICAL NURSE SPECIALIST

## 2019-04-06 PROCEDURE — 80053 COMPREHEN METABOLIC PANEL: CPT | Performed by: CLINICAL NURSE SPECIALIST

## 2019-04-06 PROCEDURE — 84100 ASSAY OF PHOSPHORUS: CPT | Performed by: CLINICAL NURSE SPECIALIST

## 2019-04-06 PROCEDURE — 36415 COLL VENOUS BLD VENIPUNCTURE: CPT | Performed by: CLINICAL NURSE SPECIALIST

## 2019-04-06 RX ADMIN — MAGNESIUM SULFATE 2 G: 2 INJECTION INTRAVENOUS at 06:39

## 2019-04-06 RX ADMIN — FAMOTIDINE 20 MG: 20 TABLET, FILM COATED ORAL at 19:48

## 2019-04-06 RX ADMIN — HEPARIN SODIUM 1500 UNITS/HR: 10000 INJECTION, SOLUTION INTRAVENOUS at 05:38

## 2019-04-06 RX ADMIN — ACETAMINOPHEN 975 MG: 325 TABLET ORAL at 05:36

## 2019-04-06 RX ADMIN — MULTIPLE VITAMINS W/ MINERALS TAB 1 TABLET: TAB at 09:45

## 2019-04-06 RX ADMIN — DIPHENHYDRAMINE HYDROCHLORIDE 25 MG: 25 CAPSULE ORAL at 23:07

## 2019-04-06 RX ADMIN — SALINE NASAL SPRAY 2 SPRAY: 1.5 SOLUTION NASAL at 13:46

## 2019-04-06 RX ADMIN — SALINE NASAL SPRAY 2 SPRAY: 1.5 SOLUTION NASAL at 17:44

## 2019-04-06 RX ADMIN — SALINE NASAL SPRAY 2 SPRAY: 1.5 SOLUTION NASAL at 09:50

## 2019-04-06 RX ADMIN — MAGNESIUM GLUCONATE 500 MG ORAL TABLET 400 MG: 500 TABLET ORAL at 09:48

## 2019-04-06 RX ADMIN — COLCHICINE 0.6 MG: 0.6 TABLET, FILM COATED ORAL at 09:45

## 2019-04-06 RX ADMIN — CHLORHEXIDINE GLUCONATE 15 ML: 1.2 RINSE ORAL at 12:40

## 2019-04-06 RX ADMIN — FOLIC ACID 1 MG: 1 TABLET ORAL at 09:45

## 2019-04-06 RX ADMIN — SILDENAFIL 20 MG: 20 TABLET ORAL at 19:48

## 2019-04-06 RX ADMIN — STANDARDIZED SENNA CONCENTRATE 1 TABLET: 8.6 TABLET ORAL at 09:48

## 2019-04-06 RX ADMIN — ACETAMINOPHEN 975 MG: 325 TABLET ORAL at 23:07

## 2019-04-06 RX ADMIN — MUPIROCIN 1 APPLICATION.: 20 OINTMENT TOPICAL at 09:50

## 2019-04-06 RX ADMIN — AMIODARONE HYDROCHLORIDE 400 MG: 200 TABLET ORAL at 09:48

## 2019-04-06 RX ADMIN — MUPIROCIN 1 APPLICATION.: 20 OINTMENT TOPICAL at 13:46

## 2019-04-06 RX ADMIN — AMIODARONE HYDROCHLORIDE 400 MG: 200 TABLET ORAL at 19:48

## 2019-04-06 RX ADMIN — SILDENAFIL 20 MG: 20 TABLET ORAL at 14:45

## 2019-04-06 RX ADMIN — Medication 100 MG: at 09:45

## 2019-04-06 RX ADMIN — Medication 6 MG: at 23:14

## 2019-04-06 RX ADMIN — POTASSIUM CHLORIDE 20 MEQ: 750 TABLET, FILM COATED, EXTENDED RELEASE ORAL at 09:48

## 2019-04-06 RX ADMIN — ACETAMINOPHEN 975 MG: 325 TABLET ORAL at 12:41

## 2019-04-06 RX ADMIN — IPRATROPIUM BROMIDE AND ALBUTEROL SULFATE 3 ML: 2.5; .5 SOLUTION RESPIRATORY (INHALATION) at 09:58

## 2019-04-06 RX ADMIN — SALINE NASAL SPRAY 2 SPRAY: 1.5 SOLUTION NASAL at 19:49

## 2019-04-06 RX ADMIN — Medication 10 ML: at 17:43

## 2019-04-06 RX ADMIN — DOCUSATE SODIUM 100 MG: 100 CAPSULE, LIQUID FILLED ORAL at 19:48

## 2019-04-06 RX ADMIN — DOCUSATE SODIUM 100 MG: 100 CAPSULE, LIQUID FILLED ORAL at 09:48

## 2019-04-06 RX ADMIN — Medication 10 ML: at 09:50

## 2019-04-06 RX ADMIN — FAMOTIDINE 20 MG: 20 TABLET, FILM COATED ORAL at 12:40

## 2019-04-06 RX ADMIN — SILDENAFIL 20 MG: 20 TABLET ORAL at 09:48

## 2019-04-06 RX ADMIN — ROSUVASTATIN CALCIUM 10 MG: 10 TABLET, FILM COATED ORAL at 17:43

## 2019-04-06 RX ADMIN — IPRATROPIUM BROMIDE AND ALBUTEROL SULFATE 3 ML: 2.5; .5 SOLUTION RESPIRATORY (INHALATION) at 19:51

## 2019-04-06 RX ADMIN — POTASSIUM CHLORIDE 20 MEQ: 750 TABLET, FILM COATED, EXTENDED RELEASE ORAL at 19:49

## 2019-04-06 RX ADMIN — FUROSEMIDE 40 MG: 10 INJECTION, SOLUTION INTRAMUSCULAR; INTRAVENOUS at 09:48

## 2019-04-06 RX ADMIN — ASPIRIN 81 MG: 81 TABLET, COATED ORAL at 09:46

## 2019-04-06 RX ADMIN — Medication 10 ML: at 01:15

## 2019-04-06 RX ADMIN — MAGNESIUM GLUCONATE 500 MG ORAL TABLET 400 MG: 500 TABLET ORAL at 19:48

## 2019-04-06 RX ADMIN — MUPIROCIN 1 APPLICATION.: 20 OINTMENT TOPICAL at 19:49

## 2019-04-06 RX ADMIN — GUAIFENESIN 1200 MG: 600 TABLET, EXTENDED RELEASE ORAL at 09:45

## 2019-04-06 RX ADMIN — STANDARDIZED SENNA CONCENTRATE 1 TABLET: 8.6 TABLET ORAL at 19:48

## 2019-04-06 RX ADMIN — KETOROLAC TROMETHAMINE 15 MG: 15 INJECTION, SOLUTION INTRAMUSCULAR; INTRAVENOUS at 05:36

## 2019-04-06 NOTE — NURSING NOTE
Per CARIE Angelo, please let pt rest throughout the night with minimal to no disturbance. Vital signs including BP and SPO2 monitoring q4 hrs, continuous HR monitoring. ECMO numbers to be recorded and checked every hour. Blood lab collection only to be done between 5539-1473 when pt is awake. Will continue to monitor pt. At this time pt does not complain of any discomfort or pain.

## 2019-04-06 NOTE — PROGRESS NOTES
Infectious Disease Progress Note    Patient Name: Wesley Fields  MR#: [id number]  : 1960  Admission Date: 3/29/2019  Date: 19   Time: 9:20 AM   Author: CARIE Mandujano        Antibiotics:    Anti-infectives     Start     Dose/Rate Route Frequency Ordered Stop    19 0930  mupirocin (BACTROBAN) 2 % ointment 1 application      1 application Topical 3 times daily 19 0831            Subjective   Awake and alert, offers no complaints. Uneventful night per nursing.   Objective     Vital Signs:    BP (!) 155/68   Pulse 70   Temp 36.8 °C (98.2 °F) (Temporal)   Resp 20   Ht 1.829 m (6')   Wt 117 kg (258 lb 13.1 oz)   SpO2 99%   BMI 35.10 kg/m²     Temp (72hrs), Av.3 °C (99.1 °F), Min:36.3 °C (97.4 °F), Max:38 °C (100.4 °F)      Physical Exam:  General: Non toxic, alert  HEENT: NC/AT/anicteric sclera, pharynx clear   Neck: Supple, no meningismus  Cardiovascular: RVAD  Respiratory: Clear to auscultation   GI/Abdomen: Soft, distended, + BS   Extremities: bilateral  +2 pedal pitting edema   Neurology: No focal deficits  Psych: Cooperative with exam  Skin: No rashes  : No lesions        Lines, Drains, Airways, Wounds:  PICC Triple Lumen 19 Left (Active)   Number of days: 4       Assist Device Line 19 Right (Active)   Number of days: 8       Surgical Incision Groin Upper (Active)   Number of days: 8       Surgical Incision Chest Right (Active)   Number of days: 8       Labs:    CBC Results       19                    0535 1708 1003         WBC 13.85 (H) 14.14 (H) 15.88 (H)         RBC 3.09 (L) 3.32 (L) 3.56 (L)         HGB 8.7 (L) 9.6 (L) 10.3 (L)         HCT 27.7 (L) 29.2 (L) 31.5 (L)         MCV 89.6 88.0 88.5         MCH 28.2 28.9 28.9         MCHC 31.4 (L) 32.9 32.7         PLT 86 (L) 81 (L) 78 (L)         Comment for PLT at 0535 on 19:  CONSISTENT WITH PREVIOUS RESULTS    Comment for PLT at 1003 on 19:  CONSISTENT WITH PREVIOUS RESULTS         CMP Results       04/06/19 04/05/19 04/05/19                    0535 1003 0424          - 136         K 4.3 4.3 4.1         Cl 107 - 105         CO2 20 (L) - 21 (L)         Glucose 121 (H) - 134 (H)         BUN 16 - 18         Creatinine 1.1 - 1.0         Calcium 8.2 (L) - 8.2 (L)         Anion Gap 11 - 10         AST 45 (H) - 44 (H)         ALT 46 - 41         Albumin 2.8 (L) - 2.5 (L)         EGFR &gt;60.0 - &gt;60.0                         Micro:  Microbiology Results     Procedure Component Value Units Date/Time    Sputum Gram Stain (Lab Only) Expectorated Sputum [[id number]] Collected:  04/05/19 0108    Specimen:  Sputum from Expectorated Sputum Updated:  04/05/19 0557     Gram Stain Result 1+ WBC      1+ Epithelial cells      2+ Gram negative bacilli      2+ Gram positive cocci in pairs      2+ Gram positive bacilli    Sputum Culture (Lab Only) Expectorated Sputum [[id number]]  (Normal) Collected:  04/05/19 0108    Specimen:  Sputum from Expectorated Sputum Updated:  04/06/19 0741     Culture Normal Ashley    MRSA screen culture (Outpatient and PAT Only) [[id number]]  (Normal) Collected:  03/20/19 1137    Specimen:  Nasal Swab from Nose Updated:  03/22/19 0646     Culture No Staphylococcus Aureus Recovered          Imaging:    Radiology Imaging   XR CHEST 1 VW    Narrative CLINICAL HISTORY: There Protek placement.      Impression IMPRESSION: Stable position of large bore central venous catheters.    COMMENT: The current portable study the chest was compared to that obtained  earlier on the same day.    The large bore central venous catheters are unchanged in position.  The distal,  axial catheter is located within the proximal left pulmonary artery.    Pleural-parenchymal disease throughout the right hemithorax remains unchanged.  There are no focal areas of consolidation.    There is no pulmonary vascular congestion or edema.  A report No pleural  effusions are demonstrated.       Assessment   1.   Status post mitral valve repair, now with right ventricular assist device.  2.  Elevated white blood cell count. 13.85 today  3.  No constitutional symptoms or sign of infection, vss, remains afebrile   4.  Multiple large catheters, but without sign of infection.          Plan    Cont to monitor off ab.       CARIE Mandujano

## 2019-04-06 NOTE — PROGRESS NOTES
Cardiothoracic Intensivist Progress Note       CARDIOTHORACIC   Post-Operative Day # 8     Subjective     History of Present Illness: Wesley Fields is a 58 y.o. cisgender male with history of decreased exercise capacity prior to OR s/p MVR, chronic HTN, and hyperlipidemia that are being treated.     Review of Systems:                                     Constitutional: no acute distress                                                  Eyes: denies difficulty with vision  Ears, nose, mouth, throat, and face: denies oral discomfort                                        Respiratory: denies shortness of breath                                  Cardiovascular: mild chest discomfort at RVAD site                                 Gastrointestinal: denies abdominal pain                                    Genitourinary: denies difficulty voiding                                      Hematologic: denies bleeding issues                                Musculoskeletal: denies muscle pain, right hand swelling                                      Neurological: generalized weakness                                   Integumentary: denies rash    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    Social History:   Social History     Social History   • Marital status:      Spouse name: N/A   • Number of  children: 4   • Years of education: N/A     Social History Main Topics   • Smoking status: Never Smoker   • Smokeless tobacco: Never Used   • Alcohol use Yes      Comment: 2-3 beers/day   • Drug use: No   • Sexual activity: Not Asked     Other Topics Concern   • None     Social History Narrative    Patient lives with his wife in a 2 story home-bathroom on 2nd floor       Family History:   Family History   Problem Relation Age of Onset   • COPD Mother    • Multiple myeloma Mother    • Lung cancer Father    • Cancer Sister         thyroid       Objective     Vital signs in last 24 hours:  Temp:  [36.3 °C (97.4 °F)-36.8 °C (98.2 °F)] 36.8 °C (98.2 °F)  Heart Rate:  [69-83] 77  Resp:  [18-20] 20  BP: (110-155)/(57-98) 155/68      Intake/Output Summary (Last 24 hours) at 04/06/19 0549  Last data filed at 04/06/19 0500   Gross per 24 hour   Intake           3376.5 ml   Output             2825 ml   Net            551.5 ml     Intake/Output this shift:  I/O this shift:  In: 939.5 [P.O.:720; I.V.:219.5]  Out: 700 [Urine:700]    Labs  Lab Results   Component Value Date    WBC 14.14 (H) 04/05/2019    HGB 9.6 (L) 04/05/2019    HCT 29.2 (L) 04/05/2019    PLT 81 (L) 04/05/2019    ALT 41 04/05/2019    AST 44 (H) 04/05/2019     04/05/2019    K 4.3 04/05/2019     04/05/2019    CREATININE 1.0 04/05/2019    BUN 18 04/05/2019    CO2 21 (L) 04/05/2019    MG 2.4 04/05/2019    PHOS 4.4 04/05/2019    INR 1.2 04/05/2019    HGBA1C 5.6 03/29/2019    PT 14.4 04/05/2019    PTT 44 (H) 04/05/2019       Full Code    Head/Ear/Nose/Throat:     NCAT.                               Eyes:     PERRL.                     Respiratory:     diminished at the bases b/l.               Cardiovascular:     1st degree AV block, mechanical heart sounds.              Gastrointestinal:     + bowel sounds.                 Genitourinary:     No deformities.                    Extremities:     trace edema b/l lower extremities. Right hand edema.              Musculoskeletal:      No injury or deformity.                   Neurological:     Follows commands.       Behavior/Emotional:     Cooperative.                           Lymph:      No adenopathy noted.                               Skin:      Clean, dry and intact.     Examination of the patient performed at the bedside. Rounded with ICU team and discussed management/plan with surgical staff. Medications, radiological studies and labs reviewed and discussed with attending of record, Dr. Georgi Pepe.    Cardiothoracic Assessment and Plan:    Neurologic: A&Ox3 and pain controlled. History of metastatic lung cancer to brain which has since been treated. Has been having some trouble sleeping and is upset about overall condition; melatonin for sleep and will consider anti-depression medication PRN.     Cardiovascular: Severe MR s/p MV repair now in cardiogenic shock with right heart failure, also with chronic diastolic CHF with a preserved EF and hypertension. RVAD placed intra-op due to worsening RV function. Unclear reason for acute decompensation as no CAD apparent and coronaries appeared to be de-aired thoroughly coming off bypass. Could have been  from pinching of coronary arteries at some point during manipulation of valve. Had some ST elevations but have since resolved. RVAD oxygenator has been capped however will not be explanted until TTE shows improvement of RV function. If RV function does not show improvement, will potentially need longer RV support albeit without oxygenator. Had decreased flows, and there were concerns of possible PE which was ruled out. Will try decreased flows on RVAD over the weekend so long as PTT appropriate. Will resuscitate, monitor MVO2/CO, wean vasoactive medication and continue to optimize cardiac medications. Appreciate Cardiology input. Continue statin for dyslipidemia.     Respiratory: I personally reviewed the most recent CXR which portrayed b/l pulmonary congestion,  persistence of right pleural effusion and small lung volumes compared to previous exam. Will consult IR for possible drainage. Will encourage IS, mobilization and wean O2 PRN.      Genitourinary: No gambino. Creatinine trending down. Making adequate urine output. Will avoid nephrotoxic medications. Goal for net negative fluid balance.     Endocrine: Follow FSBG.     Hematologic: No evidence active bleeding. On a heparin gtt. Acute (expected) blood loss anemia from surgery. Follow plasma free Hbg to monitor for hemolysis. Secondary thrombocytopenia.     Infectious Disease: No indication for antibiotics at this time. Metastatic CA appears to be regressing by latest CT scan. Leukocytosis is mild to moderate.     Fluids, Electrolytes: Electrolytes replaced per protocol. Goal for net negative fluid balance.     Gastrointestinal: PO as tolerated. GI ppx. LFTs have trended down.     Skin: OOB, PT. A-line removed from right hand and no evidence DVT based on imaging.     Tubes, lines and drains: Will remove superfluous invasive monitors PRN.    Disposition: Critically ill.  Right heart failure with RVAD support s/p major invasive cardiac surgery. Continue ICU care.     I personally spent 35 minutes of critical care time with this patient not including procedure time.          Antonio Lafleur, DO  4/6/2019

## 2019-04-06 NOTE — CONSULTS
REPORT TYPE:  Consult Note    DATE OF CONSULTATION:  04/05/2019      REASON FOR CONSULTATION:  Low-grade temperature after RVAD, slightly elevated white count, any concern for infection.    I am asked at the request of Dr. Pepe to see the patient in infectious disease consultation at his request.  The patient was seen, chart reviewed.  Current laboratory data and imaging studies have been the personally reviewed by me.    HISTORY OF PRESENT ILLNESS:  This is a 58-year-old male who was admitted semi-electively for a mitral valve replacement.  Unfortunately, he has extensive past medical history including stage IV lung cancer with mets to the brain.    In 11/2018 he was found to have worsening mitral valve disease with regurgitation and worsening dyspnea on exertion.  As a result of this, he underwent a minimally invasive mitral valve repair through a small thoracotomy.  Three sets of NeoChords were   placed and an annuloplasty band was placed.  Postoperatively, unfortunately, his right ventricle did not resume normal function and an RVAD was placed.  Yesterday he had a low-grade temperature.  There is a slightly elevated white count as well, and we   have been asked to evaluate for any infection.  Epidemiologically, he traveled to [place] [facility] in January.  He has a dog that is healthy.  No sick contacts.  The remainder of the epidemiology is unremarkable.    REVIEW OF SYSTEMS:  At this time there is no fever, chills, sweats, headache, stiff neck, joint complaint, nausea, vomiting, constipation, diarrhea.  The remainder of his complete risk review of systems has been asked and is negative.    PAST MEDICAL HISTORY:  Includes metastatic lung cancer to the brain, CHF, hypertension, hyperlipidemia, thyroid nodules, brain necrosis.    PAST SURGICAL HISTORY:  Includes cardiac catheterization, clavicle surgery, hernia repair, and the above mitral valve repair and now an RVAD.    FAMILY HISTORY:  Noncontributory for any  communicable disease.    SOCIAL HISTORY:  No alcohol, tobacco or drugs.    ALLERGIES:  None.    MEDICATIONS:  Reviewed.  He is not currently on antibiotics.    PHYSICAL EXAMINATION:  VITAL SIGNS:  Temperature currently is 97.8, T-max yesterday was 100.4, T-current is 97.8, pulse 69, respirations 18, blood pressure 123/70, pulse ox 97% on room air.  GENERAL:  He appears in no acute distress and nontoxic.  HEENT:  Normocephalic, atraumatic.  Sclerae are anicteric.  Pharynx is clear.  NECK:  Supple.  HEART:  Regular rate and rhythm.  Thoracotomy site is clean.  RVAD site in the clavicle region is clean. Left-sided PICC is clean.  LUNGS:  Clear.  ABDOMEN:  Soft.  EXTREMITIES:  With trace edema.  SKIN:  No focal rash.  JOINT:  Unremarkable.  NEUROLOGIC:  No focal deficits.  LYMPH NODE:  Negative.  PSYCHIATRIC:  Alert and oriented x2 to 3.    LABORATORY DATA AND IMAGING STUDIES:  Creatinine is 1.  White count is 15.8, hemoglobin 10.3, platelet count of 78.  He had a sputum culture sent this morning that was polymicrobial Gram stain.  MRSA screen is negative.  Chest x-ray from today showed   mild pulmonary edema.    IMPRESSIONS:  1.  Status post mitral valve repair, now with right ventricular assist device.  2.  Elevated white blood cell count.  3.  No constitutional symptoms or sign of infection.  4.  Multiple large catheters, but without sign of infection.    RECOMMENDATIONS:  At this time, will be to just follow him clinically.  His lines and tubes will be discontinued as able.  For now, I would not recommend any antibiotic therapy.  His Gram stain likely just represents oropharyngeal edgar.  We will make   further recommendations in 24 hours.      NOLA OCRRALES DO        CC:     DD: 04/05/2019 13:23  DT: 04/05/2019 22:53  Voice ID: [id number]/Report ID: [id number]  [email or web address]

## 2019-04-06 NOTE — PLAN OF CARE
Problem: Cardiac Surgery (Adult)  Goal: Signs and Symptoms of Listed Potential Problems Will be Absent, Minimized or Managed (Cardiac Surgery)  Outcome: Ongoing (interventions implemented as appropriate)   04/05/19 2150   Cardiac Surgery   Problems Assessed (Cardiac Surgery) all   Problems Present (Cardiac Surgery) cardiac complications

## 2019-04-07 ENCOUNTER — APPOINTMENT (INPATIENT)
Dept: RADIOLOGY | Facility: HOSPITAL | Age: 59
DRG: 003 | End: 2019-04-07
Attending: CLINICAL NURSE SPECIALIST
Payer: COMMERCIAL

## 2019-04-07 LAB
ALBUMIN SERPL-MCNC: 2.9 G/DL (ref 3.4–5)
ALP SERPL-CCNC: 67 IU/L (ref 35–126)
ALT SERPL-CCNC: 43 IU/L (ref 16–63)
ANION GAP SERPL CALC-SCNC: 11 MEQ/L (ref 3–15)
ANION GAP SERPL CALC-SCNC: 12 MEQ/L (ref 3–15)
APTT PPP: 179 SEC (ref 23–35)
APTT PPP: 39 SEC (ref 23–35)
APTT PPP: 51 SEC (ref 23–35)
APTT PPP: 53 SEC (ref 23–35)
AST SERPL-CCNC: 35 IU/L (ref 15–41)
BILIRUB SERPL-MCNC: 0.7 MG/DL (ref 0.3–1.2)
BUN SERPL-MCNC: 15 MG/DL (ref 8–20)
BUN SERPL-MCNC: 16 MG/DL (ref 8–20)
CALCIUM SERPL-MCNC: 7.9 MG/DL (ref 8.9–10.3)
CALCIUM SERPL-MCNC: 8 MG/DL (ref 8.9–10.3)
CHLORIDE SERPL-SCNC: 103 MEQ/L (ref 98–109)
CHLORIDE SERPL-SCNC: 106 MEQ/L (ref 98–109)
CO2 SERPL-SCNC: 20 MEQ/L (ref 22–32)
CO2 SERPL-SCNC: 21 MEQ/L (ref 22–32)
CREAT SERPL-MCNC: 1.1 MG/DL
CREAT SERPL-MCNC: 1.1 MG/DL
ERYTHROCYTE [DISTWIDTH] IN BLOOD BY AUTOMATED COUNT: 15.6 % (ref 11.6–14.4)
ERYTHROCYTE [DISTWIDTH] IN BLOOD BY AUTOMATED COUNT: 15.7 % (ref 11.6–14.4)
FIBRINOGEN PPP-MCNC: 483 MG/DL (ref 220–480)
FIBRINOGEN PPP-MCNC: 563 MG/DL (ref 220–480)
FIBRINOGEN PPP-MCNC: 569 MG/DL (ref 220–480)
GFR SERPL CREATININE-BSD FRML MDRD: >60 ML/MIN/1.73M*2
GFR SERPL CREATININE-BSD FRML MDRD: >60 ML/MIN/1.73M*2
GLUCOSE BLD-MCNC: 108 MG/DL (ref 70–99)
GLUCOSE SERPL-MCNC: 115 MG/DL (ref 70–99)
GLUCOSE SERPL-MCNC: 155 MG/DL (ref 70–99)
HCT VFR BLDCO AUTO: 30.7 %
HCT VFR BLDCO AUTO: 30.8 %
HCT VFR BLDCO AUTO: 31 %
HCT VFR BLDCO AUTO: 32.2 %
HGB BLD-MCNC: 10 G/DL
HGB BLD-MCNC: 10.1 G/DL
HGB BLD-MCNC: 10.4 G/DL
HGB BLD-MCNC: 9.9 G/DL
HGB FREE PLAS-MCNC: <30 MG/DL
MAGNESIUM SERPL-MCNC: 2.5 MG/DL (ref 1.8–2.5)
MCH RBC QN AUTO: 28.3 PG (ref 28–33.2)
MCH RBC QN AUTO: 28.5 PG (ref 28–33.2)
MCH RBC QN AUTO: 28.6 PG (ref 28–33.2)
MCH RBC QN AUTO: 28.7 PG (ref 28–33.2)
MCHC RBC AUTO-ENTMCNC: 32.2 G/DL (ref 32.2–36.5)
MCHC RBC AUTO-ENTMCNC: 32.3 G/DL (ref 32.2–36.5)
MCHC RBC AUTO-ENTMCNC: 32.5 G/DL (ref 32.2–36.5)
MCHC RBC AUTO-ENTMCNC: 32.6 G/DL (ref 32.2–36.5)
MCV RBC AUTO: 87.5 FL (ref 83–98)
MCV RBC AUTO: 87.6 FL (ref 83–98)
MCV RBC AUTO: 88 FL (ref 83–98)
MCV RBC AUTO: 89 FL (ref 83–98)
MICROORGANISM SPEC CULT: NORMAL
PDW BLD AUTO: 10.2 FL (ref 9.4–12.4)
PDW BLD AUTO: 10.4 FL (ref 9.4–12.4)
PDW BLD AUTO: 10.5 FL (ref 9.4–12.4)
PDW BLD AUTO: 9.8 FL (ref 9.4–12.4)
PHOSPHATE SERPL-MCNC: 4.3 MG/DL (ref 2.4–4.7)
PLATELET # BLD AUTO: 105 K/UL
PLATELET # BLD AUTO: 115 K/UL
PLATELET # BLD AUTO: 127 K/UL
PLATELET # BLD AUTO: 131 K/UL
POCT TEST: ABNORMAL
POTASSIUM SERPL-SCNC: 3.9 MEQ/L (ref 3.6–5.1)
POTASSIUM SERPL-SCNC: 4 MEQ/L (ref 3.6–5.1)
PROT SERPL-MCNC: 4.8 G/DL (ref 6–8.2)
RBC # BLD AUTO: 3.45 M/UL (ref 4.5–5.8)
RBC # BLD AUTO: 3.5 M/UL (ref 4.5–5.8)
RBC # BLD AUTO: 3.54 M/UL (ref 4.5–5.8)
RBC # BLD AUTO: 3.68 M/UL (ref 4.5–5.8)
SAO2 % BLDV: 52.1 % (ref 30–60)
SAO2 % BLDV: 53.3 % (ref 30–60)
SAO2 % BLDV: 61.3 % (ref 30–60)
SODIUM SERPL-SCNC: 134 MEQ/L (ref 136–144)
SODIUM SERPL-SCNC: 139 MEQ/L (ref 136–144)
WBC # BLD AUTO: 13.56 K/UL
WBC # BLD AUTO: 13.66 K/UL
WBC # BLD AUTO: 16.03 K/UL
WBC # BLD AUTO: 16.1 K/UL

## 2019-04-07 PROCEDURE — 71045 X-RAY EXAM CHEST 1 VIEW: CPT

## 2019-04-07 PROCEDURE — 80048 BASIC METABOLIC PNL TOTAL CA: CPT | Performed by: CLINICAL NURSE SPECIALIST

## 2019-04-07 PROCEDURE — 63600000 HC DRUGS/DETAIL CODE: Performed by: CLINICAL NURSE SPECIALIST

## 2019-04-07 PROCEDURE — 63700000 HC SELF-ADMINISTRABLE DRUG: Performed by: NURSE PRACTITIONER

## 2019-04-07 PROCEDURE — 63600000 HC DRUGS/DETAIL CODE: Performed by: PHYSICIAN ASSISTANT

## 2019-04-07 PROCEDURE — 63700000 HC SELF-ADMINISTRABLE DRUG: Performed by: CLINICAL NURSE SPECIALIST

## 2019-04-07 PROCEDURE — 36415 COLL VENOUS BLD VENIPUNCTURE: CPT | Performed by: CLINICAL NURSE SPECIALIST

## 2019-04-07 PROCEDURE — 85027 COMPLETE CBC AUTOMATED: CPT | Performed by: CLINICAL NURSE SPECIALIST

## 2019-04-07 PROCEDURE — 85384 FIBRINOGEN ACTIVITY: CPT | Performed by: CLINICAL NURSE SPECIALIST

## 2019-04-07 PROCEDURE — 20000000 HC ROOM AND CARE ICU

## 2019-04-07 PROCEDURE — 63700000 HC SELF-ADMINISTRABLE DRUG: Performed by: PHYSICIAN ASSISTANT

## 2019-04-07 PROCEDURE — 83051 HEMOGLOBIN PLASMA: CPT | Performed by: CLINICAL NURSE SPECIALIST

## 2019-04-07 PROCEDURE — 25000000 HC PHARMACY GENERAL: Performed by: NURSE PRACTITIONER

## 2019-04-07 PROCEDURE — 83735 ASSAY OF MAGNESIUM: CPT | Performed by: PHYSICIAN ASSISTANT

## 2019-04-07 PROCEDURE — 82810 BLOOD GASES O2 SAT ONLY: CPT | Performed by: CLINICAL NURSE SPECIALIST

## 2019-04-07 PROCEDURE — 99231 SBSQ HOSP IP/OBS SF/LOW 25: CPT | Performed by: NURSE PRACTITIONER

## 2019-04-07 PROCEDURE — 48000013 HC VENTRICULAR ASSIST, ECMO HOURLY

## 2019-04-07 PROCEDURE — 84100 ASSAY OF PHOSPHORUS: CPT | Performed by: CLINICAL NURSE SPECIALIST

## 2019-04-07 PROCEDURE — 94640 AIRWAY INHALATION TREATMENT: CPT

## 2019-04-07 PROCEDURE — 80053 COMPREHEN METABOLIC PANEL: CPT | Performed by: CLINICAL NURSE SPECIALIST

## 2019-04-07 PROCEDURE — 85730 THROMBOPLASTIN TIME PARTIAL: CPT | Performed by: NURSE PRACTITIONER

## 2019-04-07 RX ORDER — CETIRIZINE HYDROCHLORIDE 10 MG/1
10 TABLET ORAL NIGHTLY
Status: DISCONTINUED | OUTPATIENT
Start: 2019-04-07 | End: 2019-04-11 | Stop reason: HOSPADM

## 2019-04-07 RX ORDER — MILRINONE LACTATE 0.2 MG/ML
0.25 INJECTION, SOLUTION INTRAVENOUS CONTINUOUS
Status: DISCONTINUED | OUTPATIENT
Start: 2019-04-07 | End: 2019-04-09

## 2019-04-07 RX ORDER — LEVETIRACETAM 500 MG/1
500 TABLET ORAL 2 TIMES DAILY
Status: DISCONTINUED | OUTPATIENT
Start: 2019-04-07 | End: 2019-04-11 | Stop reason: HOSPADM

## 2019-04-07 RX ORDER — BENZONATATE 100 MG/1
100 CAPSULE ORAL 3 TIMES DAILY PRN
Status: DISCONTINUED | OUTPATIENT
Start: 2019-04-07 | End: 2019-04-11 | Stop reason: HOSPADM

## 2019-04-07 RX ORDER — KETOROLAC TROMETHAMINE 15 MG/ML
15 INJECTION, SOLUTION INTRAMUSCULAR; INTRAVENOUS 2 TIMES DAILY PRN
Status: DISCONTINUED | OUTPATIENT
Start: 2019-04-07 | End: 2019-04-09 | Stop reason: SDUPTHER

## 2019-04-07 RX ADMIN — DIPHENHYDRAMINE HYDROCHLORIDE 25 MG: 25 CAPSULE ORAL at 21:17

## 2019-04-07 RX ADMIN — COLCHICINE 0.6 MG: 0.6 TABLET, FILM COATED ORAL at 08:26

## 2019-04-07 RX ADMIN — CHLORHEXIDINE GLUCONATE 15 ML: 1.2 RINSE ORAL at 21:25

## 2019-04-07 RX ADMIN — POTASSIUM CHLORIDE 20 MEQ: 750 TABLET, FILM COATED, EXTENDED RELEASE ORAL at 20:01

## 2019-04-07 RX ADMIN — MULTIPLE VITAMINS W/ MINERALS TAB 1 TABLET: TAB at 08:26

## 2019-04-07 RX ADMIN — MILRINONE LACTATE IN DEXTROSE 0.25 MCG/KG/MIN: 200 INJECTION, SOLUTION INTRAVENOUS at 21:19

## 2019-04-07 RX ADMIN — SALINE NASAL SPRAY 2 SPRAY: 1.5 SOLUTION NASAL at 08:28

## 2019-04-07 RX ADMIN — SALINE NASAL SPRAY 2 SPRAY: 1.5 SOLUTION NASAL at 20:22

## 2019-04-07 RX ADMIN — BENZOCAINE AND MENTHOL 1 LOZENGE: 15; 3.6 LOZENGE ORAL at 20:22

## 2019-04-07 RX ADMIN — Medication 10 ML: at 17:37

## 2019-04-07 RX ADMIN — MAGNESIUM GLUCONATE 500 MG ORAL TABLET 400 MG: 500 TABLET ORAL at 20:00

## 2019-04-07 RX ADMIN — AMIODARONE HYDROCHLORIDE 400 MG: 200 TABLET ORAL at 08:27

## 2019-04-07 RX ADMIN — Medication: at 20:21

## 2019-04-07 RX ADMIN — POTASSIUM CHLORIDE 20 MEQ: 750 TABLET, FILM COATED, EXTENDED RELEASE ORAL at 08:27

## 2019-04-07 RX ADMIN — IPRATROPIUM BROMIDE AND ALBUTEROL SULFATE 3 ML: 2.5; .5 SOLUTION RESPIRATORY (INHALATION) at 20:18

## 2019-04-07 RX ADMIN — MAGNESIUM GLUCONATE 500 MG ORAL TABLET 400 MG: 500 TABLET ORAL at 08:26

## 2019-04-07 RX ADMIN — FAMOTIDINE 20 MG: 20 TABLET, FILM COATED ORAL at 20:00

## 2019-04-07 RX ADMIN — SALINE NASAL SPRAY 2 SPRAY: 1.5 SOLUTION NASAL at 14:48

## 2019-04-07 RX ADMIN — FAMOTIDINE 20 MG: 20 TABLET, FILM COATED ORAL at 08:27

## 2019-04-07 RX ADMIN — Medication 6 MG: at 21:17

## 2019-04-07 RX ADMIN — POTASSIUM CHLORIDE 20 MEQ: 200 INJECTION, SOLUTION INTRAVENOUS at 18:52

## 2019-04-07 RX ADMIN — KETOROLAC TROMETHAMINE 15 MG: 15 INJECTION, SOLUTION INTRAMUSCULAR; INTRAVENOUS at 06:18

## 2019-04-07 RX ADMIN — SILDENAFIL 20 MG: 20 TABLET ORAL at 20:01

## 2019-04-07 RX ADMIN — IPRATROPIUM BROMIDE AND ALBUTEROL SULFATE 3 ML: 2.5; .5 SOLUTION RESPIRATORY (INHALATION) at 11:15

## 2019-04-07 RX ADMIN — Medication 10 ML: at 08:39

## 2019-04-07 RX ADMIN — Medication 10 ML: at 01:15

## 2019-04-07 RX ADMIN — ROSUVASTATIN CALCIUM 10 MG: 10 TABLET, FILM COATED ORAL at 17:37

## 2019-04-07 RX ADMIN — SILDENAFIL 20 MG: 20 TABLET ORAL at 14:49

## 2019-04-07 RX ADMIN — AMIODARONE HYDROCHLORIDE 400 MG: 200 TABLET ORAL at 20:00

## 2019-04-07 RX ADMIN — FUROSEMIDE 40 MG: 10 INJECTION, SOLUTION INTRAMUSCULAR; INTRAVENOUS at 08:27

## 2019-04-07 RX ADMIN — MUPIROCIN 1 APPLICATION.: 20 OINTMENT TOPICAL at 08:28

## 2019-04-07 RX ADMIN — SILDENAFIL 20 MG: 20 TABLET ORAL at 08:27

## 2019-04-07 RX ADMIN — Medication 100 MG: at 08:26

## 2019-04-07 RX ADMIN — BENZONATATE 100 MG: 100 CAPSULE ORAL at 20:21

## 2019-04-07 RX ADMIN — MUPIROCIN 1 APPLICATION.: 20 OINTMENT TOPICAL at 20:01

## 2019-04-07 RX ADMIN — FOLIC ACID 1 MG: 1 TABLET ORAL at 08:26

## 2019-04-07 RX ADMIN — ASPIRIN 81 MG: 81 TABLET, COATED ORAL at 08:26

## 2019-04-07 RX ADMIN — MUPIROCIN 1 APPLICATION.: 20 OINTMENT TOPICAL at 14:48

## 2019-04-07 RX ADMIN — LEVETIRACETAM 500 MG: 500 TABLET, FILM COATED ORAL at 20:00

## 2019-04-07 RX ADMIN — SALINE NASAL SPRAY 2 SPRAY: 1.5 SOLUTION NASAL at 17:38

## 2019-04-07 NOTE — PROGRESS NOTES
Daily Progress Note    Subjective    Interval History: No new events overnight.  Pt denies Cp, SOB, Abd. Pain, N/V.  Pt admits to tolerating PO diet, voiding, and flatus.     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     Social History Main Topics   • Smoking status: Never Smoker   • Smokeless tobacco: Never Used   • Alcohol use Yes      Comment: 2-3 beers/day   • Drug use: No   • Sexual activity: Not on file     Other Topics Concern   • Not on file     Social History Narrative    Patient lives with his wife in a 2 story home-bathroom on 2nd floor     Current Facility-Administered Medications   Medication Dose Route Frequency Provider Last Rate Last Dose   • acetaminophen (TYLENOL) tablet 975 mg  975 mg oral q6h RIGOBERTO Gerardo Beth CRNP   975 mg at 04/06/19 2307   • albumin human 5 % solution 500 mL  500 mL intravenous PRN Michell Kirkland PA C 0 mL/hr at 04/01/19 1500 500 mL at 04/05/19 1742   • alum-mag hydroxide-simeth (MAALOX) 200-200-20 mg/5 mL suspension 30 mL  30 mL oral q4h PRN Michell Kirkland PA C       • amiodarone  (PACERONE) tablet 400 mg  400 mg oral BID Juventino Parnell PA C   400 mg at 04/06/19 1948   • aspirin enteric coated tablet 81 mg  81 mg oral Daily Michell Kirkland PA C   81 mg at 04/06/19 0946   • atropine injection 0.5 mg  0.5 mg intravenous q5 min PRN Michell Kirkland PA C       • bisacodyl (DULCOLAX) 10 mg suppository 10 mg  10 mg rectal Daily PRN Michell Kirkland PA C       • calcium chloride 1 g in sodium chloride 0.9 % 50 mL IVPB  1 g intravenous q6h PRN Michell Kirkland PA C   Stopped at 04/05/19 0629   • chlorhexidine (PERIDEX) 0.12 % mouthwash 15 mL  15 mL Mouth/Throat 2 times per day Michell Kirkland PA C   15 mL at 04/06/19 1240   • colchicine (COLCRYS) tablet 0.6 mg  0.6 mg oral Daily Michell Kirkland PA C   0.6 mg at 04/06/19 0945   • dextrose in water injection 5-15 g  10-30 mL intravenous PRN Michell Kirkland PA C       • diphenhydrAMINE (BENADRYL) capsule 25 mg  25 mg oral q6h PRN Michell Kirkland PA C   25 mg at 04/06/19 2307    Or   • diphenhydrAMINE (BENADRYL) injection 25 mg  25 mg intravenous q6h PRN Michell Kirkland PA C       • docusate sodium (COLACE) capsule 100 mg  100 mg oral BID Michell Kirkland PA C   100 mg at 04/06/19 1948   • famotidine (PEPCID) tablet 20 mg  20 mg oral BID Michell Kirkland PA C   20 mg at 04/06/19 1948   • folic acid (FOLVITE) tablet 1 mg  1 mg oral Daily Gerardo Beth CRNP   1 mg at 04/06/19 0945   • furosemide (LASIX) injection 40 mg  40 mg intravenous Daily Amelia Angelo CRNP   40 mg at 04/06/19 0948   • heparin infusion in D5W 100 units/mL  1,900 Units/hr intravenous Titrated Amelia Angelo CRNP 19 mL/hr at 04/07/19 0500 1,900 Units/hr at 04/07/19 0500   • HYDROmorphone (DILAUDID) 1 mg/mL injection 0.5 mg  0.5 mg intravenous q1h PRN Gerardo Beth CRNP   0.5 mg at 03/30/19 9422   • ipratropium-albuterol (DUO-NEB) 0.5-2.5 mg/3 mL nebulizer solution 3 mL  3 mL nebulization q6h RIGOBERTO  Zakiya Amor CRNP   3 mL at 04/06/19 1951   • ketorolac (TORADOL) injection 15 mg  15 mg intravenous q12h (6a, 6p) Monica Castillo PA C   Stopped at 04/06/19 1800   • lorlatinib tablet 75 mg  75 mg oral Daily Gerardo Beth CRNP   75 mg at 04/06/19 0949   • magnesium oxide (MAG-OX) tablet 400 mg  400 mg oral BID Michell Kirkland PA C   400 mg at 04/06/19 1948   • magnesium sulfate IVPB 2g in 50 mL NSS/D5W/SWFI  2 g intravenous PRN Michell Kirkland PA C   Stopped at 04/06/19 0839   • melatonin ODT 6 mg  6 mg oral Nightly PRN Juventino Parnell PA C   6 mg at 04/06/19 2314   • midazolam (VERSED) 1 mg/mL injection 2 mg  2 mg intravenous q4h PRN Juventino Parnell PA C   2 mg at 03/31/19 2035   • multivitamin tablet 1 tablet  1 tablet oral Daily Michell Kirkland PA C   1 tablet at 04/06/19 0945   • mupirocin (BACTROBAN) 2 % ointment 1 application  1 application Topical TID Monica Castillo PA C   1 application at 04/06/19 1949   • ondansetron ODT (ZOFRAN-ODT) disintegrating tablet 4 mg  4 mg oral q8h PRN Michell Kirkland PA C        Or   • ondansetron (ZOFRAN) injection 4 mg  4 mg intravenous q8h PRN Michell Kirkland PA C       • oxyCODONE (ROXICODONE) immediate release tablet 10 mg  10 mg oral q6h PRN Monica Castillo PA C   10 mg at 04/01/19 1019   • oxyCODONE (ROXICODONE) immediate release tablet 5 mg  5 mg oral q4h PRN Monica Castillo PA C   5 mg at 04/02/19 0108   • potassium chloride (KLOR-CON) tablet extended release 20 mEq  20 mEq oral BID Michell Kirkland PA C   20 mEq at 04/06/19 1949   • potassium chloride 20 mEq in 100 mL IVPB  (premix)  20 mEq intravenous q8h PRN Michell Kirkland PA C   Stopped at 04/05/19 1230   • rosuvastatin (CRESTOR) tablet 10 mg  10 mg oral Daily (6p) Michell Kirkland PA C   10 mg at 04/06/19 1743   • senna (SENOKOT) tablet 1 tablet  1 tablet oral 2x daily PRN Michell Kirkland PA C       • senna  (SENOKOT) tablet 1 tablet  1 tablet oral BID Michell Kirkland PA C   1 tablet at 04/06/19 1948   • sildenafil (antihypertensive) (REVATIO) tablet 20 mg  20 mg oral TID Zakiya Amor CRNP   20 mg at 04/06/19 1948   • sodium bicarbonate 8.4 % (1 mEq/mL) injection 45-90 mEq  45-90 mEq intravenous PRN Michell Kirkland PA C       • sodium chloride (OCEAN) 0.65 % nasal spray 2 spray  2 spray Each Nostril QID Juventino Parnell PA C   2 spray at 04/06/19 1949   • sodium chloride 0.9 % infusion   intravenous Continuous Amelia Angelo CRNP 5 mL/hr at 04/07/19 0500     • sodium chloride flush 10 mL  10 mL intravenous q8h INT Zakiya Amor CRNP   10 mL at 04/07/19 0115   • sodium chloride flush 10 mL  10 mL intravenous PRN Zakiya Amor CRNP       • thiamine (VITAMIN B1) tablet 100 mg  100 mg oral Daily Gerardo Beth CRNP   100 mg at 04/06/19 0945     No Known Allergies      Objective    Vital signs in last 24 hours:  Temp:  [36 °C (96.8 °F)-36.7 °C (98 °F)] 36.7 °C (98 °F)  Heart Rate:  [67-81] 67  Resp:  [20] 20  BP: (102-141)/(56-70) 141/66      Intake/Output Summary (Last 24 hours) at 04/07/19 0537  Last data filed at 04/07/19 0500   Gross per 24 hour   Intake          2135.43 ml   Output             3500 ml   Net         -1364.57 ml     [unfilled]    Physical Exam:  BP (!) 141/66   Pulse 67   Temp 36.7 °C (98 °F) (Temporal)   Resp 20   Ht 1.829 m (6')   Wt 118 kg (259 lb 14.8 oz)   SpO2 96%   BMI 35.25 kg/m²     Physical Exam   Constitutional: He is oriented to person, place, and time. He appears well-developed and well-nourished.   HENT:   Head: Normocephalic and atraumatic.   Eyes: Pupils are equal, round, and reactive to light.   Neck: Normal range of motion. Neck supple.   Cardiovascular: Normal rate and regular rhythm.    Proteck Duo in R upper chest   Pulmonary/Chest: Effort normal and breath sounds normal.   Abdominal: Soft. Bowel sounds are normal.    Genitourinary: Penis normal.   Musculoskeletal: Normal range of motion.   Neurological: He is alert and oriented to person, place, and time.   Skin: Skin is warm and dry. Capillary refill takes less than 2 seconds.   Psychiatric: He has a normal mood and affect. His behavior is normal. Judgment and thought content normal.   Nursing note and vitals reviewed.    Labs  Lab Results   Component Value Date    WBC 15.86 (H) 04/06/2019    HGB 10.5 (L) 04/06/2019    HCT 31.7 (L) 04/06/2019     (L) 04/06/2019    ALT 46 04/06/2019    AST 45 (H) 04/06/2019     04/06/2019    K 4.3 04/06/2019     04/06/2019    CREATININE 1.1 04/06/2019    BUN 16 04/06/2019    CO2 20 (L) 04/06/2019    INR 1.1 04/06/2019    HGBA1C 5.6 03/29/2019       Imaging  I have independently reviewed the pertinent imaging from the last 24 hrs.    ECG/Telemetry  I have independently reviewed the telemetry. No events for the last 24 hours.    VTE Assessment: I have reassessed and the patient's VTE risk and treatment plan is appropriate.          Assessment/Plan   Pulmonary hypertension (CMS/HCC) ([facility])   Assessment & Plan    sildandifinil 20mg TID     CHF (congestive heart failure), NYHA class III, chronic, diastolic (CMS/HCC) ([facility])   Assessment & Plan    His preop ejection fraction is 45%    Continue Crestor  Continue Lasix 40 IV  daily  Holding home lisinopril during the perioperative phase       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)   Assessment & Plan    Patient with a history of metastatic stage IV lung cancer with metastatic disease to the brain status post chemo and radiation (brain only)    Continue his home Lorlatinib     Hyperlipidemia   Assessment & Plan    Continue home Crestor     HTN (hypertension)   Assessment & Plan    Holding home lisinopril during the perioperative phase     * Mitral valve insufficiency   Assessment & Plan    Status post mitral valve repair with a number 31 mm annuloplasty band and dora-chord repair x2  on 3/29/2019 the patient did have some RV dysfunction intraoperatively which required insertion of a Protec 2 oh with an oxygenator via the right subclavian vein.  -Intraoperatively the patient did require 4 units of packed red blood cells, 4 platelets, 20 cryo   -Patient was extubated on 3/30/2019 with Protec still in place and the oxygenator at 50%    Plan to wean Protek duo Monday to re eval need   Sildanifil 20 TID for some pulm htn  Continue heparin drip for a Protec duo as well as his mitral valve repair  Patient will resume Coumadin once a Protec is removed  Continue aspirin, Crestor  Continue Lasix 40 IV  daily  Continue colchicine for PPS prevention   Continue Pepcid for GI prophylaxis  Encourage I-S, ambulation and out of bed to chair when able                Expected Discharge Date:  4/15/2019 No discharge date for patient encounter.    CARIE Hunter  4/7/2019

## 2019-04-07 NOTE — ASSESSMENT & PLAN NOTE
sildandifinil 20mg TID on hold for rapidly improving RV MVO2 overnight satisfactory will get echo today to eval RV function and continuation of drug will be determined by Dr. Klein

## 2019-04-07 NOTE — PROGRESS NOTES
Patient: Wesley Fields  Location: [address] [address]  MRN: [id number]  Today's date: 4/7/2019    Attempted to see patient for therapy. Unable due to medical hold.  need ecmo to assist to mobilize.

## 2019-04-07 NOTE — PLAN OF CARE
Problem: Cardiac Surgery (Adult)  Goal: Signs and Symptoms of Listed Potential Problems Will be Absent, Minimized or Managed (Cardiac Surgery)  Outcome: Ongoing (interventions implemented as appropriate)   04/06/19 2036   Cardiac Surgery   Problems Assessed (Cardiac Surgery) all   Problems Present (Cardiac Surgery) none

## 2019-04-07 NOTE — NURSING NOTE
Patient with sudden onset full body twitching. Wife was present. Patient was coughing and then became unresponsive per wife. No changes in HR, BP, SP02 or Protek flows. Patient was twitching upon RN entrance into room, perfusion bedside, ecmo NP called, cardiac surgery PA bedside. Patient quickly came to. Neuro assessment normal. Equal strength throughout. Answers all questions appropriately. No weakness per patient. Pupils intact equal round reactive. Neuro consulted

## 2019-04-08 ENCOUNTER — APPOINTMENT (INPATIENT)
Dept: RADIOLOGY | Facility: HOSPITAL | Age: 59
DRG: 003 | End: 2019-04-08
Attending: CLINICAL NURSE SPECIALIST
Payer: COMMERCIAL

## 2019-04-08 ENCOUNTER — APPOINTMENT (INPATIENT)
Dept: CARDIOLOGY | Facility: HOSPITAL | Age: 59
DRG: 003 | End: 2019-04-08
Payer: COMMERCIAL

## 2019-04-08 ENCOUNTER — APPOINTMENT (INPATIENT)
Dept: NEUROLOGY | Facility: HOSPITAL | Age: 59
DRG: 003 | End: 2019-04-08
Attending: CLINICAL NURSE SPECIALIST
Payer: COMMERCIAL

## 2019-04-08 LAB
ALBUMIN SERPL-MCNC: 2.7 G/DL (ref 3.4–5)
ALP SERPL-CCNC: 66 IU/L (ref 35–126)
ALT SERPL-CCNC: 37 IU/L (ref 16–63)
ANION GAP SERPL CALC-SCNC: 9 MEQ/L (ref 3–15)
APTT PPP: 53 SEC (ref 23–35)
APTT PPP: 58 SEC (ref 23–35)
APTT PPP: 69 SEC (ref 23–35)
AST SERPL-CCNC: 30 IU/L (ref 15–41)
BILIRUB SERPL-MCNC: 0.7 MG/DL (ref 0.3–1.2)
BSA FOR ECHO PROCEDURE: 2.4 M2
BUN SERPL-MCNC: 12 MG/DL (ref 8–20)
CALCIUM SERPL-MCNC: 7.9 MG/DL (ref 8.9–10.3)
CHLORIDE SERPL-SCNC: 109 MEQ/L (ref 98–109)
CO2 SERPL-SCNC: 21 MEQ/L (ref 22–32)
CREAT SERPL-MCNC: 1.1 MG/DL
CROSSMATCH: NORMAL
ERYTHROCYTE [DISTWIDTH] IN BLOOD BY AUTOMATED COUNT: 15.3 % (ref 11.6–14.4)
ERYTHROCYTE [DISTWIDTH] IN BLOOD BY AUTOMATED COUNT: 15.5 % (ref 11.6–14.4)
FIBRINOGEN PPP-MCNC: 528 MG/DL (ref 220–480)
GFR SERPL CREATININE-BSD FRML MDRD: >60 ML/MIN/1.73M*2
GLUCOSE SERPL-MCNC: 125 MG/DL (ref 70–99)
HCT VFR BLDCO AUTO: 29.9 %
HCT VFR BLDCO AUTO: 30.4 %
HGB BLD-MCNC: 9.6 G/DL
HGB BLD-MCNC: 9.9 G/DL
HGB FREE PLAS-MCNC: 30 MG/DL
INR PPP: 1.1 INR
ISBT CODE: 5100
MAGNESIUM SERPL-MCNC: 2.4 MG/DL (ref 1.8–2.5)
MCH RBC QN AUTO: 28.1 PG (ref 28–33.2)
MCH RBC QN AUTO: 29.5 PG (ref 28–33.2)
MCHC RBC AUTO-ENTMCNC: 31.6 G/DL (ref 32.2–36.5)
MCHC RBC AUTO-ENTMCNC: 33.1 G/DL (ref 32.2–36.5)
MCV RBC AUTO: 88.9 FL (ref 83–98)
MCV RBC AUTO: 89 FL (ref 83–98)
PDW BLD AUTO: 10.1 FL (ref 9.4–12.4)
PDW BLD AUTO: 9.8 FL (ref 9.4–12.4)
PHOSPHATE SERPL-MCNC: 4.1 MG/DL (ref 2.4–4.7)
PLATELET # BLD AUTO: 138 K/UL
PLATELET # BLD AUTO: 143 K/UL
POTASSIUM SERPL-SCNC: 4.3 MEQ/L (ref 3.6–5.1)
PRODUCT CODE: NORMAL
PRODUCT STATUS: NORMAL
PROT SERPL-MCNC: 4.8 G/DL (ref 6–8.2)
PROTHROMBIN TIME: 13.3 SEC (ref 12.2–14.5)
RBC # BLD AUTO: 3.36 M/UL (ref 4.5–5.8)
RBC # BLD AUTO: 3.42 M/UL (ref 4.5–5.8)
SAO2 % BLDV: 51.7 % (ref 30–60)
SAO2 % BLDV: 55.6 % (ref 30–60)
SAO2 % BLDV: 56.8 % (ref 30–60)
SAO2 % BLDV: 61.7 % (ref 30–60)
SAO2 % BLDV: 67 % (ref 30–60)
SAO2 % BLDV: 67.6 % (ref 30–60)
SAO2 % BLDV: 77.1 % (ref 30–60)
SODIUM SERPL-SCNC: 139 MEQ/L (ref 136–144)
SPECIMEN EXP DATE BLD: NORMAL
UNIT ABO: NORMAL
UNIT ID: NORMAL
UNIT RH: POSITIVE
WBC # BLD AUTO: 14.22 K/UL
WBC # BLD AUTO: 16.08 K/UL

## 2019-04-08 PROCEDURE — 63600000 HC DRUGS/DETAIL CODE: Performed by: NURSE PRACTITIONER

## 2019-04-08 PROCEDURE — 63700000 HC SELF-ADMINISTRABLE DRUG: Performed by: NURSE PRACTITIONER

## 2019-04-08 PROCEDURE — 27200000 HC STERILE SUPPLY

## 2019-04-08 PROCEDURE — 63700000 HC SELF-ADMINISTRABLE DRUG: Performed by: CLINICAL NURSE SPECIALIST

## 2019-04-08 PROCEDURE — 85384 FIBRINOGEN ACTIVITY: CPT | Performed by: CLINICAL NURSE SPECIALIST

## 2019-04-08 PROCEDURE — 93321 DOPPLER ECHO F-UP/LMTD STD: CPT | Mod: 26 | Performed by: INTERNAL MEDICINE

## 2019-04-08 PROCEDURE — 33992 RMVL PERQ LEFT HEART VAD: CPT | Performed by: INTERNAL MEDICINE

## 2019-04-08 PROCEDURE — 82810 BLOOD GASES O2 SAT ONLY: CPT | Performed by: CLINICAL NURSE SPECIALIST

## 2019-04-08 PROCEDURE — 85027 COMPLETE CBC AUTOMATED: CPT | Performed by: CLINICAL NURSE SPECIALIST

## 2019-04-08 PROCEDURE — 92960 CARDIOVERSION ELECTRIC EXT: CPT | Performed by: INTERNAL MEDICINE

## 2019-04-08 PROCEDURE — 63700000 HC SELF-ADMINISTRABLE DRUG: Performed by: PHYSICIAN ASSISTANT

## 2019-04-08 PROCEDURE — 200200 PR NO CHARGE: Performed by: ANESTHESIOLOGY

## 2019-04-08 PROCEDURE — 95819 EEG AWAKE AND ASLEEP: CPT

## 2019-04-08 PROCEDURE — 99223 1ST HOSP IP/OBS HIGH 75: CPT | Performed by: PSYCHIATRY & NEUROLOGY

## 2019-04-08 PROCEDURE — 25000000 HC PHARMACY GENERAL: Performed by: NURSE PRACTITIONER

## 2019-04-08 PROCEDURE — 33966 ECMO/ECLS RMVL PRPH CANNULA: CPT | Performed by: INTERNAL MEDICINE

## 2019-04-08 PROCEDURE — 93308 TTE F-UP OR LMTD: CPT | Mod: 26 | Performed by: INTERNAL MEDICINE

## 2019-04-08 PROCEDURE — 63600000 HC DRUGS/DETAIL CODE: Mod: JW | Performed by: INTERNAL MEDICINE

## 2019-04-08 PROCEDURE — 48000013 HC VENTRICULAR ASSIST, ECMO HOURLY

## 2019-04-08 PROCEDURE — P9016 RBC LEUKOCYTES REDUCED: HCPCS

## 2019-04-08 PROCEDURE — 83735 ASSAY OF MAGNESIUM: CPT | Performed by: PHYSICIAN ASSISTANT

## 2019-04-08 PROCEDURE — C1751 CATH, INF, PER/CENT/MIDLINE: HCPCS | Performed by: INTERNAL MEDICINE

## 2019-04-08 PROCEDURE — 93503 INSERT/PLACE HEART CATHETER: CPT | Performed by: INTERNAL MEDICINE

## 2019-04-08 PROCEDURE — 63600000 HC DRUGS/DETAIL CODE: Performed by: CLINICAL NURSE SPECIALIST

## 2019-04-08 PROCEDURE — 80053 COMPREHEN METABOLIC PANEL: CPT | Performed by: CLINICAL NURSE SPECIALIST

## 2019-04-08 PROCEDURE — 02PA3RZ REMOVAL OF SHORT-TERM EXTERNAL HEART ASSIST SYSTEM FROM HEART, PERCUTANEOUS APPROACH: ICD-10-PCS | Performed by: INTERNAL MEDICINE

## 2019-04-08 PROCEDURE — C1769 GUIDE WIRE: HCPCS | Performed by: INTERNAL MEDICINE

## 2019-04-08 PROCEDURE — 36430 TRANSFUSION BLD/BLD COMPNT: CPT

## 2019-04-08 PROCEDURE — 97116 GAIT TRAINING THERAPY: CPT | Mod: GP

## 2019-04-08 PROCEDURE — 5A2204Z RESTORATION OF CARDIAC RHYTHM, SINGLE: ICD-10-PCS | Performed by: INTERNAL MEDICINE

## 2019-04-08 PROCEDURE — 20000000 HC ROOM AND CARE ICU

## 2019-04-08 PROCEDURE — 27200000 HC STERILE SUPPLY: Performed by: INTERNAL MEDICINE

## 2019-04-08 PROCEDURE — C1894 INTRO/SHEATH, NON-LASER: HCPCS | Performed by: INTERNAL MEDICINE

## 2019-04-08 PROCEDURE — 84100 ASSAY OF PHOSPHORUS: CPT | Performed by: CLINICAL NURSE SPECIALIST

## 2019-04-08 PROCEDURE — 83051 HEMOGLOBIN PLASMA: CPT | Performed by: CLINICAL NURSE SPECIALIST

## 2019-04-08 PROCEDURE — 85730 THROMBOPLASTIN TIME PARTIAL: CPT | Performed by: ANESTHESIOLOGY

## 2019-04-08 PROCEDURE — 4A133B1 MONITORING OF ARTERIAL PRESSURE, PERIPHERAL, PERCUTANEOUS APPROACH: ICD-10-PCS | Performed by: INTERNAL MEDICINE

## 2019-04-08 PROCEDURE — 36415 COLL VENOUS BLD VENIPUNCTURE: CPT | Performed by: CLINICAL NURSE SPECIALIST

## 2019-04-08 PROCEDURE — 71045 X-RAY EXAM CHEST 1 VIEW: CPT

## 2019-04-08 PROCEDURE — 93308 TTE F-UP OR LMTD: CPT

## 2019-04-08 PROCEDURE — 94640 AIRWAY INHALATION TREATMENT: CPT

## 2019-04-08 PROCEDURE — 93325 DOPPLER ECHO COLOR FLOW MAPG: CPT | Mod: 26 | Performed by: INTERNAL MEDICINE

## 2019-04-08 PROCEDURE — 36140 INTRO NDL ICATH UPR/LXTR ART: CPT | Mod: XS | Performed by: INTERNAL MEDICINE

## 2019-04-08 PROCEDURE — 99291 CRITICAL CARE FIRST HOUR: CPT | Mod: 25 | Performed by: ANESTHESIOLOGY

## 2019-04-08 PROCEDURE — 95819 EEG AWAKE AND ASLEEP: CPT | Mod: 26 | Performed by: PSYCHIATRY & NEUROLOGY

## 2019-04-08 PROCEDURE — 85730 THROMBOPLASTIN TIME PARTIAL: CPT | Performed by: NURSE PRACTITIONER

## 2019-04-08 PROCEDURE — 85610 PROTHROMBIN TIME: CPT | Performed by: CLINICAL NURSE SPECIALIST

## 2019-04-08 PROCEDURE — 4A023N6 MEASUREMENT OF CARDIAC SAMPLING AND PRESSURE, RIGHT HEART, PERCUTANEOUS APPROACH: ICD-10-PCS | Performed by: INTERNAL MEDICINE

## 2019-04-08 RX ORDER — WARFARIN SODIUM 5 MG/1
5 TABLET ORAL ONCE
Status: COMPLETED | OUTPATIENT
Start: 2019-04-08 | End: 2019-04-08

## 2019-04-08 RX ORDER — PROPOFOL 200MG/20ML
SYRINGE (ML) INTRAVENOUS AS NEEDED
Status: DISCONTINUED | OUTPATIENT
Start: 2019-04-08 | End: 2019-04-08 | Stop reason: HOSPADM

## 2019-04-08 RX ORDER — AMIODARONE HYDROCHLORIDE 150 MG/3ML
INJECTION, SOLUTION INTRAVENOUS AS NEEDED
Status: DISCONTINUED | OUTPATIENT
Start: 2019-04-08 | End: 2019-04-08 | Stop reason: HOSPADM

## 2019-04-08 RX ORDER — WARFARIN 1 MG/1
TABLET ORAL
Status: CANCELLED | OUTPATIENT
Start: 2019-04-08 | End: 2020-04-07

## 2019-04-08 RX ADMIN — FUROSEMIDE 40 MG: 10 INJECTION, SOLUTION INTRAMUSCULAR; INTRAVENOUS at 08:33

## 2019-04-08 RX ADMIN — FAMOTIDINE 20 MG: 20 TABLET, FILM COATED ORAL at 08:28

## 2019-04-08 RX ADMIN — BENZONATATE 100 MG: 100 CAPSULE ORAL at 20:25

## 2019-04-08 RX ADMIN — SALINE NASAL SPRAY 2 SPRAY: 1.5 SOLUTION NASAL at 18:07

## 2019-04-08 RX ADMIN — SALINE NASAL SPRAY 2 SPRAY: 1.5 SOLUTION NASAL at 16:01

## 2019-04-08 RX ADMIN — COLCHICINE 0.6 MG: 0.6 TABLET, FILM COATED ORAL at 08:27

## 2019-04-08 RX ADMIN — IPRATROPIUM BROMIDE AND ALBUTEROL SULFATE 3 ML: 2.5; .5 SOLUTION RESPIRATORY (INHALATION) at 20:42

## 2019-04-08 RX ADMIN — MILRINONE LACTATE IN DEXTROSE 0.25 MCG/KG/MIN: 200 INJECTION, SOLUTION INTRAVENOUS at 18:18

## 2019-04-08 RX ADMIN — LEVETIRACETAM 500 MG: 500 TABLET, FILM COATED ORAL at 08:33

## 2019-04-08 RX ADMIN — AMIODARONE HYDROCHLORIDE 400 MG: 200 TABLET ORAL at 08:30

## 2019-04-08 RX ADMIN — Medication 10 ML: at 18:06

## 2019-04-08 RX ADMIN — BENZONATATE 100 MG: 100 CAPSULE ORAL at 08:31

## 2019-04-08 RX ADMIN — SALINE NASAL SPRAY 2 SPRAY: 1.5 SOLUTION NASAL at 22:52

## 2019-04-08 RX ADMIN — IPRATROPIUM BROMIDE AND ALBUTEROL SULFATE 3 ML: 2.5; .5 SOLUTION RESPIRATORY (INHALATION) at 07:38

## 2019-04-08 RX ADMIN — ACETAMINOPHEN 975 MG: 325 TABLET ORAL at 14:45

## 2019-04-08 RX ADMIN — ROSUVASTATIN CALCIUM 10 MG: 10 TABLET, FILM COATED ORAL at 18:05

## 2019-04-08 RX ADMIN — ACETAMINOPHEN 975 MG: 325 TABLET ORAL at 08:32

## 2019-04-08 RX ADMIN — SILDENAFIL 20 MG: 20 TABLET ORAL at 20:21

## 2019-04-08 RX ADMIN — FOLIC ACID 1 MG: 1 TABLET ORAL at 08:29

## 2019-04-08 RX ADMIN — POTASSIUM CHLORIDE 20 MEQ: 750 TABLET, FILM COATED, EXTENDED RELEASE ORAL at 20:20

## 2019-04-08 RX ADMIN — Medication: at 20:35

## 2019-04-08 RX ADMIN — POTASSIUM CHLORIDE 20 MEQ: 750 TABLET, FILM COATED, EXTENDED RELEASE ORAL at 08:29

## 2019-04-08 RX ADMIN — MAGNESIUM GLUCONATE 500 MG ORAL TABLET 400 MG: 500 TABLET ORAL at 20:20

## 2019-04-08 RX ADMIN — SALINE NASAL SPRAY 2 SPRAY: 1.5 SOLUTION NASAL at 08:31

## 2019-04-08 RX ADMIN — KETOROLAC TROMETHAMINE 15 MG: 15 INJECTION, SOLUTION INTRAMUSCULAR; INTRAVENOUS at 18:54

## 2019-04-08 RX ADMIN — SILDENAFIL 20 MG: 20 TABLET ORAL at 08:30

## 2019-04-08 RX ADMIN — MULTIPLE VITAMINS W/ MINERALS TAB 1 TABLET: TAB at 08:29

## 2019-04-08 RX ADMIN — BENZONATATE 100 MG: 100 CAPSULE ORAL at 06:09

## 2019-04-08 RX ADMIN — SILDENAFIL 20 MG: 20 TABLET ORAL at 16:01

## 2019-04-08 RX ADMIN — Medication 10 ML: at 08:34

## 2019-04-08 RX ADMIN — LEVETIRACETAM 500 MG: 500 TABLET, FILM COATED ORAL at 20:23

## 2019-04-08 RX ADMIN — HEPARIN SODIUM 2000 UNITS/HR: 10000 INJECTION, SOLUTION INTRAVENOUS at 14:14

## 2019-04-08 RX ADMIN — MAGNESIUM GLUCONATE 500 MG ORAL TABLET 400 MG: 500 TABLET ORAL at 08:29

## 2019-04-08 RX ADMIN — WARFARIN SODIUM 5 MG: 5 TABLET ORAL at 18:05

## 2019-04-08 RX ADMIN — CETIRIZINE HYDROCHLORIDE 10 MG: 10 TABLET, FILM COATED ORAL at 22:52

## 2019-04-08 RX ADMIN — ASPIRIN 81 MG: 81 TABLET, COATED ORAL at 08:27

## 2019-04-08 RX ADMIN — AMIODARONE HYDROCHLORIDE 400 MG: 200 TABLET ORAL at 20:20

## 2019-04-08 RX ADMIN — MUPIROCIN 1 APPLICATION.: 20 OINTMENT TOPICAL at 20:21

## 2019-04-08 RX ADMIN — MUPIROCIN 1 APPLICATION.: 20 OINTMENT TOPICAL at 08:34

## 2019-04-08 RX ADMIN — CHLORHEXIDINE GLUCONATE 15 ML: 1.2 RINSE ORAL at 22:57

## 2019-04-08 RX ADMIN — BENZOCAINE AND MENTHOL 1 LOZENGE: 15; 3.6 LOZENGE ORAL at 20:25

## 2019-04-08 RX ADMIN — Medication 100 MG: at 08:32

## 2019-04-08 RX ADMIN — FAMOTIDINE 20 MG: 20 TABLET, FILM COATED ORAL at 20:20

## 2019-04-08 ASSESSMENT — COGNITIVE AND FUNCTIONAL STATUS - GENERAL
MOVING TO AND FROM BED TO CHAIR: 3 - A LITTLE
CLIMB 3 TO 5 STEPS WITH RAILING: 3 - A LITTLE
WALKING IN HOSPITAL ROOM: 3 - A LITTLE
STANDING UP FROM CHAIR USING ARMS: 3 - A LITTLE

## 2019-04-08 NOTE — PLAN OF CARE
Problem: Cardiac Surgery (Adult)  Intervention: Promote/Maintain Hemodynamic Stability   04/07/19 8143   Safety Interventions   Medication Review/Management medications reviewed;high risk medications identified

## 2019-04-08 NOTE — PLAN OF CARE
Problem: Patient Care Overview  Goal: Plan of Care Review  Outcome: Ongoing (interventions implemented as appropriate)   04/08/19 4050   Coping/Psychosocial   Plan Of Care Reviewed With patient   Plan of Care Review   Progress progress toward functional goals as expected   Outcome Summary Pt progressing towards goals set by PT, continue per PT POC.        Problem: Cardiac Surgery (Adult)  Goal: Signs and Symptoms of Listed Potential Problems Will be Absent, Minimized or Managed (Cardiac Surgery)  Outcome: Ongoing (interventions implemented as appropriate)

## 2019-04-08 NOTE — PROCEDURES
Procedure: Synchronized cardioversion. Indication: Atrial fibrillation. Time out performed at 1019. Zoll pads applied to left anterior chest wall and left posterior back. Pt in a supine position in cath lab. After sedation given, defibrillator charged to 200 J and placed in synchronized mode. With everyone clear of the patient, shock was administered. Sinus rhythm observed on the monitor. Uncomplicated procedure.

## 2019-04-08 NOTE — PLAN OF CARE
Problem: Patient Care Overview  Goal: Plan of Care Review  Outcome: Ongoing (interventions implemented as appropriate)   04/08/19 1358   Coping/Psychosocial   Plan Of Care Reviewed With patient;spouse   Plan of Care Review   Progress progress toward functional goals is gradual       Problem: Pressure Ulcer Risk (Justin Scale) (Adult,Obstetrics,Pediatric)  Goal: Identify Related Risk Factors and Signs and Symptoms  Outcome: Outcome(s) Achieved Date Met: 04/08/19 04/08/19 1358   Pressure Ulcer Risk (Justin Scale)   Related Risk Factors (Pressure Ulcer Risk (Justin Scale)) critical care admission     Goal: Skin Integrity  Outcome: Ongoing (interventions implemented as appropriate)   04/08/19 1358   Pressure Ulcer Risk (Justin Scale) (Adult,Obstetrics,Pediatric)   Skin Integrity making progress toward outcome       Problem: Fall Risk (Adult)  Goal: Identify Related Risk Factors and Signs and Symptoms  Outcome: Outcome(s) Achieved Date Met: 04/08/19 04/08/19 1358   Fall Risk   Related Risk Factors (Fall Risk) gait/mobility problems;age-related changes     Goal: Absence of Falls  Outcome: Ongoing (interventions implemented as appropriate)   04/08/19 1358   Fall Risk (Adult)   Absence of Falls making progress toward outcome       Problem: Cardiac Surgery (Adult)  Goal: Signs and Symptoms of Listed Potential Problems Will be Absent, Minimized or Managed (Cardiac Surgery)  Outcome: Ongoing (interventions implemented as appropriate)   04/08/19 1358   Cardiac Surgery   Problems Assessed (Cardiac Surgery) all   Problems Present (Cardiac Surgery) cardiac complications;situational response     Goal: Anesthesia/Sedation Recovery  Outcome: Outcome(s) Achieved Date Met: 04/08/19 04/08/19 1358   Goal/Outcome Evaluation   Anesthesia/Sedation Recovery recovered to baseline

## 2019-04-08 NOTE — PROGRESS NOTES
Cardiothoracic Intensivist Progress Note       CARDIOTHORACIC   Post-Operative Day # 10     Subjective     History of Present Illness: Wesley Fields is a 58 y.o. cisgender male with history of decreased exercise capacity prior to OR s/p MVR, chronic HTN, and hyperlipidemia that are being treated.     Review of Systems:                                     Constitutional: no acute distress                                                  Eyes: denies difficulty with vision  Ears, nose, mouth, throat, and face: denies oral discomfort                                        Respiratory: denies shortness of breath                                  Cardiovascular: mild chest discomfort at RVAD site                                 Gastrointestinal: denies abdominal pain                                    Genitourinary: denies difficulty voiding                                      Hematologic: denies bleeding issues                                Musculoskeletal: denies muscle pain, right hand swelling                                      Neurological: generalized weakness                                   Integumentary: denies rash    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    Social History:   Social History     Social History   • Marital status:      Spouse name: N/A   • Number of  children: 4   • Years of education: N/A     Social History Main Topics   • Smoking status: Never Smoker   • Smokeless tobacco: Never Used   • Alcohol use Yes      Comment: 2-3 beers/day   • Drug use: No   • Sexual activity: Not Asked     Other Topics Concern   • None     Social History Narrative    Patient lives with his wife in a 2 story home-bathroom on 2nd floor       Family History:   Family History   Problem Relation Age of Onset   • COPD Mother    • Multiple myeloma Mother    • Lung cancer Father    • Cancer Sister         thyroid       Objective     Vital signs in last 24 hours:  Temp:  [36.4 °C (97.5 °F)-36.8 °C (98.3 °F)] 36.8 °C (98.3 °F)  Heart Rate:  [70-94] 94  Resp:  [20] 20  BP: (109-161)/(57-79) 136/79      Intake/Output Summary (Last 24 hours) at 04/08/19 0926  Last data filed at 04/08/19 0900   Gross per 24 hour   Intake          2066.17 ml   Output             2200 ml   Net          -133.83 ml     Intake/Output this shift:  I/O this shift:  In: 441.6 [P.O.:340; I.V.:101.6]  Out: -     Labs  Lab Results   Component Value Date    WBC 14.22 (H) 04/08/2019    HGB 9.6 (L) 04/08/2019    HCT 30.4 (L) 04/08/2019     (L) 04/08/2019    ALT 37 04/08/2019    AST 30 04/08/2019     04/08/2019    K 4.3 04/08/2019     04/08/2019    CREATININE 1.1 04/08/2019    BUN 12 04/08/2019    CO2 21 (L) 04/08/2019    MG 2.4 04/08/2019    PHOS 4.1 04/08/2019    INR 1.1 04/08/2019    HGBA1C 5.6 03/29/2019    PT 13.3 04/08/2019    PTT 53 (H) 04/08/2019       Full Code    Head/Ear/Nose/Throat:     NCAT.                               Eyes:     PERRL.                     Respiratory:     diminished at the bases b/l.               Cardiovascular:     Atrial fibrillation, mechanical heart sounds.              Gastrointestinal:     + bowel sounds.                 Genitourinary:     No deformities.                    Extremities:     trace edema b/l lower extremities.              Musculoskeletal:      No injury or  deformity.                   Neurological:     Follows commands.       Behavior/Emotional:     Cooperative.                           Lymph:      No adenopathy noted.                               Skin:      Clean, dry and intact.     Examination of the patient performed at the bedside. Rounded with ICU team and discussed management/plan with surgical staff. Medications, radiological studies and labs reviewed and discussed with attending of record, Dr. Georgi Pepe.    Cardiothoracic Assessment and Plan:    Neurologic: A&Ox3 and pain controlled. Had questionable seizure activity/syncopal episode 4/7 which resolved spontaneously; no post-ictal period or change in hemodynamics. History of metastatic lung cancer to brain which has since been treated. Unclear if brain metastasis could have had any contribution to neuro process but will have Neurology evaluate; EEG scheduled. Has been having some trouble sleeping and is upset about overall condition; melatonin for sleep and will consider anti-depression medication PRN.     Cardiovascular: Severe MR s/p MV repair now in cardiogenic shock with right heart failure, also with chronic diastolic CHF with a preserved EF and hypertension. RVAD placed intra-op due to worsening RV function. Unclear reason for acute decompensation as no CAD apparent and coronaries appeared to be de-aired thoroughly coming off bypass. Could have been  from pinching of coronary arteries at some point during manipulation of valve. Had some ST elevations but have since resolved. Had decreased flows on RVAD, and there were concerns of possible PE which was ruled out. Milrinone started in anticipation of RVAD removal. Has had PAF before and now more persistent; on amiodarone therapy. Unclear if a-fib related to initiation of milrinone. Possible explant of RVAD today if tolerates lower flows. Will resuscitate, monitor MVO2/CO, wean vasoactive medication and continue to optimize cardiac medications.  Appreciate Cardiology input. Continue statin for dyslipidemia.     Respiratory: I personally reviewed the most recent CXR which portrayed b/l pulmonary congestion, persistence of right pleural effusion and small lung volumes compared to previous exam. Will consult IR for possible drainage if becomes any worse or begins to effect pulmonary mechanics. Will encourage IS, mobilization and wean O2 PRN.      Genitourinary: No gambino. Creatinine trending down. Making adequate urine output. Will avoid nephrotoxic medications. Goal for net negative fluid balance.     Endocrine: Follow [facility].     Hematologic: No evidence active bleeding. On a heparin gtt. Acute (expected) blood loss anemia from surgery. Follow plasma free Hbg to monitor for hemolysis. Secondary thrombocytopenia. Will have Heme/Onc evaluate most recent CT scan to make sure metastatic lesions are not worse compared to previous imaging.     Infectious Disease: No indication for antibiotics at this time. Leukocytosis is mild to moderate.     Fluids, Electrolytes: Electrolytes replaced per protocol. Goal for net negative fluid balance.     Gastrointestinal: PO as tolerated. GI ppx. LFTs have trended down.     Skin: OOB, PT. A-line removed from right hand and no evidence DVT based on imaging; swelling has decreased.     Tubes, lines and drains: Will remove superfluous invasive monitors PRN.    Disposition: Critically ill.  Right heart failure with RVAD support s/p major invasive cardiac surgery. Continue ICU care.     I personally spent 35 minutes of critical care time with this patient not including procedure time.          Antonio Lafleur,   4/8/2019

## 2019-04-08 NOTE — PROGRESS NOTES
Brief Nutrition Note    Recommendations   Reg, TIARA diet   No need for ONS at this time     Nutrition Charting Type: Nutrition Brief Assessment    Clinical Course: Patient is a 58 y.o. male who was admitted on 3/29/2019 with a diagnosis of Mitral valve insufficiency, unspecified etiology [I34.0]  Mitral regurgitation [I34.0].     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)    • Radiation therapy induced brain necrosis     last steroid 1/2019     Past Surgical History:   Procedure Laterality Date   • CARDIAC CATHETERIZATION     • CLAVICLE SURGERY Right     biopsy   • HERNIA REPAIR         General Information  Nutrition Evaluation/Patient Follow-Up: Mildly Nutritionally Compromised-Follow up in 4-6 days  Reason for Consult: Provider order  Patient Already Seen On: 04/04/19     Nutrition/Diet History  Patient Reported Diet: other (see comments) (no added salt)  Appetite Prior to Admission: Good-50-75%  Intake (%): 75%    Physical Appearance  Overall Physical Appearance: obese, Other (comments) (protek duo )  Last Bowel Movement: 04/07/19  Skin: edema, surgical incision     Nutrition Order Review  Nutrition Order Review: meets nutritional requirements  Nutrition Order Review Comments: cardiac      Anthropometrics  Height: 182.9 cm (6')     Current Weight  Weight Method: Standing scale  Weight: 116 kg (256 lb 13.4 oz)  Estimated Dry Weight: 104 kg (229 lb)     Ideal Body Weight (IBW)  Ideal Body Weight (IBW) (kg): 82.07  % Ideal Body Weight: 137.62     Body Mass Index (BMI)  BMI (Calculated): 34.8  BMI (Calculated on Estimated Dry Weight): 31.1     Lab Results  Lab Results Reviewed: reviewed,  pertinent   BMP Results       04/08/19 04/07/19 04/07/19                    0521 1603 0529          134 (L) 139         K 4.3 3.9 4.0         Cl 109 103 106         CO2 21 (L) 20 (L) 21 (L)         Glucose 125 (H) 155 (H) 115 (H)         BUN 12 16 15         Creatinine 1.1 1.1 1.1         Calcium 7.9 (L) 8.0 (L) 7.9 (L)         Anion Gap 9 11 12         EGFR &gt;60.0 &gt;60.0 &gt;60.0                     Mg 2.4, Phos 4.1       Pertinent Medications  Pertinent Medications Reviewed: reviewed, pertinent   Keppra, magox, MVI, klor con, crestor, thiamine, colace, pepcid, folvite, lasix, senokot    Skin: intact     Clinical comments: Pt having pro david duo removed in cath lab today. States is eating % meals, good appetite.   Will liberalize to Jason diet per pt request.     Goals: > 75% meals  Monitor: labs, skin, po diet irena    Recommendations: See above       Date: 04/08/19  Signature: Lacy Minor RD

## 2019-04-08 NOTE — PROGRESS NOTES
Patient: Wesley Fields  Location: [address] [address]  MRN: [id number]  Today's date: 4/8/2019    Attempted to see patient for therapy. Unable due to patient refused (Pt sleeping; family requested defer. Will f/u tomorrow.).

## 2019-04-08 NOTE — CONSULTS
Neurology Consult Note    Please see neurology consult note done today by myself.    Maddie Lynn MD

## 2019-04-08 NOTE — CONSULTS
Neurology Consult Note    Subjective     The patient is a 58 y.o. right handed male with a past medical history of hypertension, hyperlipidemia, CHF, mitral regurgitation, and stage IV lung cancer with brain metastases, who presented to the [facility] on 03/29/2019 for mitral valve repair.    On 03/29/2019, he underwent minimally invasive mitral valve repair for worsening mitral regurgitation and dyspnea on exertion. In the OR, he had right ventricular failure and hypoxemia and was placed on ECMO with right ventricular assist device (RVAD). He received multiple units of PRBCs and Cryoprecipitate. He was extubated on 03/30/2019.      On 04/07/2019 around 3:00 PM, he was coughing and his wife encouraged him to use his incentive spirometer but he became unresponsive shortly after. His eyes appeared to roll back into his head, and all of his limbs began to twitch. His wife called for help and the CTICU team witnessed generalized twitching as well. The patient quickly came to. He answered all questions appropriately and had equal strength. He had no apparent post-ictal confusion. He remembered that he had severe cough prior to his event. However, he did not remember that he was talking to his wife. He thought that he had fallen asleep and had a bad dream. He did not lose control of bowel or bladder or bite his tongue.    Today, he had following procedures:  Successful removal of Protek duo cannula.   Insertion of right subclavian monitoring swan catheter.  Cardioversion for atrial fibrillation.  Insertion of right radial arterial line.    Currently, he is awake, alert, and oriented x3. He reports having a mild frontal headache today. No dizziness, speech changes, or visual disturbances. He reports that his right upper extremity feels heavy, he thought that this was due to his procedure site pain, catheter, and swelling. He denies weakness in his left upper extremity or bilateral lower extremities.      According to his wife,  the patient's diagnosis of lung cancer was made in . He was found with brain metastases around 2016 and was started on Zykadia at that time. The patient had radiation to the brain around 2018 due to new brain lesions. Subsequently, he developed radiation necrosis of the brain with edema and a midline shift. He was treated with high doses of steroids with a good response per his wife. He now takes LORBRENA.     No prior history of seizures.    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    Home Medications:  •  chlorhexidine, Swish and spit 15 mL 2 (two) times a day.  •  furosemide, Take 40 mg by mouth 2 (two) times a day.  •  lorlatinib, Take 75 mg by mouth daily.  •  metoprolol succinate XL, Take 25 mg by mouth daily.  •  [] mupirocin, Apply 1 application topically 2 (two) times a day for 5 days. Nasal application, starting 5 days before surgery  •  potassium chloride (KLOR-CON ORAL), Take 10 mEq by mouth. Every other day    •  rosuvastatin, Take 10 mg by mouth daily.  •  lisinopril, Take 20 mg by mouth 2 (two) times a day.    Current Facility-Administered Medications   Medication Dose Route Frequency Provider Last Rate Last Dose   • acetaminophen (TYLENOL) tablet 975 mg  975 mg oral q6h Gerardo Hyde CRNP   975 mg at 19 0832   • albumin human 5 % solution 500 mL   500 mL intravenous PRN Michell Kirkland PA C 0 mL/hr at 04/01/19 1500 500 mL at 04/05/19 1742   • alum-mag hydroxide-simeth (MAALOX) 200-200-20 mg/5 mL suspension 30 mL  30 mL oral q4h PRN Michell Kirkland PA C       • amiodarone (CORDARONE) 150 mg in dextrose 5 % 100 mL IVPB  150 mg intravenous Once Amelia Angelo CRNP   Stopped at 04/08/19 1248   • amiodarone (PACERONE) tablet 400 mg  400 mg oral BID Juventino Parnell PA C   400 mg at 04/08/19 0830   • aspirin enteric coated tablet 81 mg  81 mg oral Daily Michell Kirkland PA C   81 mg at 04/08/19 0827   • atropine injection 0.5 mg  0.5 mg intravenous q5 min PRN Michell Kirkland PA C       • benzocaine-menthol (CEPACOL) lozenge 1 lozenge  1 lozenge Mouth/Throat q2h PRN Zakiya Amor CRNP   1 lozenge at 04/07/19 2022   • benzonatate (TESSALON) capsule 100 mg  100 mg oral 3x daily PRN Zakiya Amor CRNP   100 mg at 04/08/19 0831   • bisacodyl (DULCOLAX) 10 mg suppository 10 mg  10 mg rectal Daily PRN Michell Kirkland PA C       • calcium chloride 1 g in sodium chloride 0.9 % 50 mL IVPB  1 g intravenous q6h PRN Michell Kirkland PA C   Stopped at 04/05/19 0629   • camphor-methyl salicyl-menthol (MUSCLE RUB) cream   Topical PRN Zakiya Amor CRNP       • cetirizine (ZyrTEC) tablet 10 mg  10 mg oral Nightly Zakiya Amor CRNP       • chlorhexidine (PERIDEX) 0.12 % mouthwash 15 mL  15 mL Mouth/Throat 2 times per day Michell Kirkland PA C   15 mL at 04/07/19 2125   • colchicine (COLCRYS) tablet 0.6 mg  0.6 mg oral Daily Michell Kirkland PA C   0.6 mg at 04/08/19 0827   • dextrose in water injection 5-15 g  10-30 mL intravenous PRN Michell Kirkland PA C       • diphenhydrAMINE (BENADRYL) capsule 25 mg  25 mg oral q6h PRN Michell Kirkland PA C   25 mg at 04/07/19 2117    Or   • diphenhydrAMINE (BENADRYL) injection 25 mg  25 mg intravenous q6h PRN Michell Kirkland  RAPAHEL NEELY       • docusate sodium (COLACE) capsule 100 mg  100 mg oral BID Michell Kirkland PA C   100 mg at 04/06/19 1948   • famotidine (PEPCID) tablet 20 mg  20 mg oral BID Michell Kirkland PA C   20 mg at 04/08/19 0828   • folic acid (FOLVITE) tablet 1 mg  1 mg oral Daily Gerardo Beth CRNP   1 mg at 04/08/19 0829   • furosemide (LASIX) injection 40 mg  40 mg intravenous Daily Amelia Angelo CRNP   40 mg at 04/08/19 0833   • heparin infusion in D5W 100 units/mL  2,000 Units/hr intravenous Titrated Amelia Angelo CRNP   Stopped at 04/08/19 0943   • HYDROmorphone (DILAUDID) 1 mg/mL injection 0.5 mg  0.5 mg intravenous q1h PRN Gerardo Beth CRNP   0.5 mg at 03/30/19 1719   • ipratropium-albuterol (DUO-NEB) 0.5-2.5 mg/3 mL nebulizer solution 3 mL  3 mL nebulization q6h [facility] Zakiya Amor CRNP   3 mL at 04/08/19 0738   • ketorolac (TORADOL) injection 15 mg  15 mg intravenous 2x daily PRN Zakiya Amor CRNP       • levETIRAcetam (KEPPRA) tablet 500 mg  500 mg oral BID Amelia Angelo CRNP   500 mg at 04/08/19 0833   • lorlatinib tablet 75 mg  75 mg oral Daily Gerardo Beth CRNP   75 mg at 04/08/19 0835   • magnesium oxide (MAG-OX) tablet 400 mg  400 mg oral BID Michell Kirkland PA C   400 mg at 04/08/19 0829   • magnesium sulfate IVPB 2g in 50 mL NSS/D5W/SWFI  2 g intravenous PRN Michell Kirkland PA C   Stopped at 04/06/19 0839   • melatonin ODT 6 mg  6 mg oral Nightly PRN Juventino Parnell PA C   6 mg at 04/07/19 2117   • midazolam (VERSED) 1 mg/mL injection 2 mg  2 mg intravenous q4h PRN Juventino Parnell PA C   2 mg at 03/31/19 2035   • milrinone (PRIMACOR) infusion 40 mg/200 mL (premix)  0.25 mcg/kg/min intravenous Continuous Amelia Angelo CRNP 8.85 mL/hr at 04/08/19 1200 0.25 mcg/kg/min at 04/08/19 1200   • multivitamin tablet 1 tablet  1 tablet oral Daily Michell Kirkland PA C   1 tablet at 04/08/19 0829   • mupirocin (BACTROBAN) 2 %  ointment 1 application  1 application Topical TID Monica Castillo PA C   1 application at 04/08/19 0834   • ondansetron ODT (ZOFRAN-ODT) disintegrating tablet 4 mg  4 mg oral q8h PRN Michell Kirkland PA C        Or   • ondansetron (ZOFRAN) injection 4 mg  4 mg intravenous q8h PRN Michell Kirkland PA C       • oxyCODONE (ROXICODONE) immediate release tablet 10 mg  10 mg oral q6h PRN Monica Castillo PA C   10 mg at 04/01/19 1019   • oxyCODONE (ROXICODONE) immediate release tablet 5 mg  5 mg oral q4h PRN Monica Castillo PA C   5 mg at 04/02/19 0108   • potassium chloride (KLOR-CON) tablet extended release 20 mEq  20 mEq oral BID Michell Kirkland PA C   20 mEq at 04/08/19 0829   • potassium chloride 20 mEq in 100 mL IVPB  (premix)  20 mEq intravenous q8h PRN Michell Kirkland PA C   Stopped at 04/07/19 1957   • rosuvastatin (CRESTOR) tablet 10 mg  10 mg oral Daily (6p) Michell Kirkland PA C   10 mg at 04/07/19 1737   • senna (SENOKOT) tablet 1 tablet  1 tablet oral 2x daily PRN Michell Kirkland PA C       • senna (SENOKOT) tablet 1 tablet  1 tablet oral BID Michell Kirkland PA C   1 tablet at 04/06/19 1948   • sildenafil (antihypertensive) (REVATIO) tablet 20 mg  20 mg oral TID Zakiya Amor CRNP   20 mg at 04/08/19 0830   • sodium bicarbonate 8.4 % (1 mEq/mL) injection 45-90 mEq  45-90 mEq intravenous PRN Michell Kirkland PA C       • sodium chloride (OCEAN) 0.65 % nasal spray 2 spray  2 spray Each Nostril QID Juventino Parnell PA C   2 spray at 04/08/19 0831   • sodium chloride 0.9 % infusion   intravenous Continuous Amelia Angelo CRNP 5 mL/hr at 04/08/19 1200     • sodium chloride flush 10 mL  10 mL intravenous q8h INT Zakiya Amor CRNP   10 mL at 04/08/19 0834   • sodium chloride flush 10 mL  10 mL intravenous PRN Zakiya Amor CRNP       • thiamine (VITAMIN B1) tablet 100 mg  100 mg oral Daily Gerardo Beth CRNP    100 mg at 04/08/19 0832     Social History:   Social History     Social History   • Marital status:      Spouse name: N/A   • Number of children: 4   • Years of education: N/A     Social History Main Topics   • Smoking status: Never Smoker   • Smokeless tobacco: Never Used   • Alcohol use Yes      Comment: 2-3 beers/day   • Drug use: No   • Sexual activity: Not Asked     Social History Narrative    Patient lives with his wife in a 2 story home-bathroom on 2nd floor     Family History:   Family History   Problem Relation Age of Onset   • COPD Mother    • Multiple myeloma Mother    • Lung cancer Father    • Cancer Sister         thyroid     Review of Systems:  All other systems reviewed and negative except as noted in the HPI.    Objective     Vital signs in last 24 hours:  Temp:  [36.7 °C (98 °F)-36.8 °C (98.3 °F)] 36.8 °C (98.3 °F)  Heart Rate:  [73-94] 75  Resp:  [20] 20  BP: (109-161)/(57-79) 136/79    /79   Pulse 75   Temp 36.8 °C (98.3 °F) (Temporal)   Resp 20   Ht 1.829 m (6')   Wt 116 kg (256 lb 13.4 oz)   SpO2 96%   BMI 34.83 kg/m²     Physical Exam:  General Appearance: Awake, Not in Acute Distress.   Head: Normocephalic, atraumatic.   Eyes: Pupils were about equal and reacted to light.    Neck: Supple, no nuchal rigidity. No apparent bruits.   Respiratory: CTA.   Cardiovascular: HR in 70s.   Gastrointestinal: Soft, + Bowel sounds.   Extremities: Right hand edema.   Skin: Dry.   Neurologic:  AAOx3. Speech seemed to be clear. CN: Pupils were about equal and reacted to light. EOM grossly intact. VFF. No obvious nystagmus. No clear diplopia. Facial PP sensation was grossly normal. No clear facial asymmetry. Tongue protruded midline without bite marks. PP sensation was grossly normal bilaterally. Motor: No drift in LUE and B/L LEs. Mild drift in RUE. No obviously increased muscle tone. DTRs: About 2+ in UEs; Left knee jerk > right knee jerk. Toes were likely downgoing B/L. F to N was  grossly intact B/L. Gait: Deferred.   Behavior/Emotional: Cooperative.     LABS:  CBC Results       04/08/19 04/08/19 04/07/19                    1241 0521 1603         WBC 16.08 (H) 14.22 (H) 16.10 (H)         RBC 3.36 (L) 3.42 (L) 3.54 (L)         HGB 9.9 (L) 9.6 (L) 10.1 (L)         HCT 29.9 (L) 30.4 (L) 31.0 (L)         MCV 89.0 88.9 87.6         MCH 29.5 28.1 28.5         MCHC 33.1 31.6 (L) 32.6          (L) 143 (L) 131 (L)                     BMP Results       04/08/19 04/07/19 04/07/19                    0521 1603 0529          134 (L) 139         K 4.3 3.9 4.0         Cl 109 103 106         CO2 21 (L) 20 (L) 21 (L)         Glucose 125 (H) 155 (H) 115 (H)         BUN 12 16 15         Creatinine 1.1 1.1 1.1         Calcium 7.9 (L) 8.0 (L) 7.9 (L)         Anion Gap 9 11 12         EGFR &gt;60.0 &gt;60.0 &gt;60.0                     Reviewed lab results in the chart.    IMAGING:  Outside studies from [facility]:  MRI of brain with and without contrast 10/10/2018:  Increase in size of the metastasis within the anterior right frontal lobe with interval development of 0.6 cm leftward midline shift. Remainder of the intracranial metastases have decreased in size. No new intracranial metastatic disease.    MRI of brain with and without contrast 12/06/2018:  Impression:  1. A heterogenerously enhancing metastatic mass in the right frontal lobe with cystic changes showing interval decrease in size. There is interval decrease in associated vasogenic edema. There is no mass-effect or midline shift.    2. Two subcentimeter metastatic nodules stable from prior exam. No new metastatic lesions are seen in the brain.    3. Mild changes of chronic microangiopathy.  4. Mild bilateral mastoiditis.    I personally reviewed the above reports.     ECG/TELEMETRY/ECHO:  Transthoracic echo (TTE) limited with Doppler and color 04/08/2019:  Interpretation Summary     Technically limited study.  Limited  study for right ventricular function.     1. Normal cavity size and preserved systolic function.  Abnormal septal motion.  Unable to fully assess regional wall motion abnormalities or wall thickness given limited study.  2. Mildly dilated right ventricular size with mildly reduced systolic function.  Protek-Duo Catheter is visualized in the right-sided cardiac structures.  3. Grossly normal aortic valve.   4. Thickened mitral leaflets. Status post mitral valve repair with annuloplasty ring and neocords with residual mild mitral regurgiation.   5. Mildly dilated left atrium.  6. Insufficient tricuspid regurgitation to determine RVSP.  7. Pulmonic valve not well visualized  8. Small pericardial effusion.  9.   Compared to previous study dated 4/4/2019, right ventricular function has improved.  10. Discussed with Amelia.     Ultrasound carotid bilateral 03/21/2019:  1-15% of stenosis of both ICAs.    Assessment and Plan:    Assessment   58 y.o. male with a past medical history of hypertension, hyperlipidemia, CHF, mitral regurgitation, and stage IV lung cancer with brain metastases underwent minimally invasive mitral valve repair on 03/29/2019. He is being consulted for an unresponsive episode that occurred on 04/07/2019. Seizure versus convulsive syncope in the setting of severe cough. Rule out acute intracranial process. Rule out new metastatic lesions.    Plan   -Check EEG.  -If able, would check MRI brain with and without contrast. Otherwise, check head CT with and without contrast.    -Continue Keppra 500 mg BID.  -The patient should not drive for now.   -Medical and supportive care per CTICU team.     Care plan was discussed with the patient, his wife, and primary team resident and attending.     Face to face patient counseling and coordinating care > 50% encounter time.  Total encounter time: 70 minutes.    Maddie Lynn MD  4/8/2019

## 2019-04-08 NOTE — PLAN OF CARE
Problem: Cardiac Surgery (Adult)  Intervention: Manage Post-operative Pain   04/07/19 2134   Manage Acute Burn Pain   Pain Management Interventions around-the-clock dosing utilized;pain management plan reviewed with patient/caregiver

## 2019-04-08 NOTE — PROGRESS NOTES
Patient: Wesley Fields  Location: [address] [address]23  MRN: [id number]  Today's date: 4/8/2019    Pt was left seated in bathroom with RN and wife. All needs met. Pt received propofol for procedure this morning per anaesthesiologist. VSS. RN present throughout tx.     Patient performed warm up (x10) and cool down (x5) exercises for gait training including: ankle pumps, LAQ, seated marches, elbow flexion, and shoulder flexion. Pt performed standing marches x10.     Pt ambulated 201' without an AD at CS level with chair follow. Pt ambulated with scissoring gait at times and decreased step length. VC's for energy conservation techniques.     Hospital Course  Wesley STALLINGS is a 58 y.o. male admitted on 3/29/2019 with Mitral valve insufficiency, unspecified etiology [I34.0]  Mitral regurgitation [I34.0]. Principal problem is Mitral valve insufficiency.    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     Admit w/mitral insufficiency s/p MVR w/ dora cords, +thoractomy> hosp course complicated by hypoxemia, +PFO w/cardiogenic shock/R ventricular dysfunction> now on VV ECMO thru subclavian    4/3/19 Cardiology assessed pt: plan to remain on ECMO through this week.     4/5 improving R heart function per SAURABH on 4/4          Therapy Pain/Vitals     Row Name 04/08/19 1640          Pain/Comfort/Sleep    Pain Charting Type Pain Assessment     Presence of Pain denies     Preferred Pain Scale number (Numeric Rating Pain Scale)     Pain Rating (0-10): Rest 0     Pain Rating (0-10): Activity 0        Vital Signs    Pulse 80     Heart Rate Source Monitor     SpO2 94 %     Patient  Activity At rest     Oxygen Therapy None (Room air)     BP (!)  147/66     MAP (mmHg) 92 mmHg     BP Location Other (Comment)   arterial line     Patient Position Sitting           Prior Living Environment      Most Recent Value   Lives With  spouse [wife's name: Mulugeta]   Living Arrangements  house   Living Environment Comment  2SH w/4 steps to enter w/o railing, powder room on first floor, both tub and shower stall shower on 2nd floor, bedroom on 2nd floor    Equipment Currently Used at Home  none          Prior Level of Function      Most Recent Value   Ambulation  independent   Transferring  independent   Toileting  independent   Bathing  independent   Dressing  independent   Eating  independent   Equipment Currently Used at Home  none   Prior Functional Level Comment  INd w/o AD for community distances and stairs.  Wife does most of IADl's.  Pt was working.                 PT Treatment Summary - 04/08/19 1620        Session Details    Document Type daily treatment    Mode of Treatment individual therapy;physical therapy       Time Calculation    Start Time 1620    Stop Time 1636    Time Calculation (min) 16 min       General Information    Patient Profile Reviewed? yes    Existing Precautions/Restrictions cardiac;fall       Sit to Stand Transfer    [place], Sit to Stand Transfer close supervision       Stand to Sit Transfer    [place], Stand to Sit Transfer close supervision       Gait Training    [place], Gait close supervision    Distance in Feet 201 feet    Gait Pattern Utilized step-through       Stairs Training    [place], Stairs not tested       LE Seated Therapeutic Exercise    Comment see note       AM-PAC (TM) - Mobility (Current Function)    Turning from your back to your side while in a flat bed without using bedrails? 3 - A Little    Moving from lying on your back to sitting on the side of a flat bed without using bedrails? 3 - A Little    Moving to and from a bed to a chair? 3 - A  Little    Standing up from a chair using your arms? 3 - A Little    To walk in a hospital room? 3 - A Little    Climbing 3-5 steps with a railing? 3 - A Little    AM-PAC (TM) Mobility Score 18       PT Clinical Impression    Plan For Care Reviewed: Physical Therapy patient voices agreement with PT plan for care    Rehab Potential/Prognosis good, to achieve stated therapy goals    PT Frequency of Treatment 5-7 times per week    Anticipated Equipment Needs at Discharge none    Daily Outcome Statement Pt ambulated with close supervision and no AD. Rec home with suppotive wife when medically stable.           Pain Assessment/Intervention  Pain Charting Type: Pain Assessment             Education provided this session. See the Patient Education summary report for full details.  Pt educated on energy conservation techniques. Pt was receptive to education and verbalized understanding.     PT Care Plan Goals      Most Recent Value   Stair Goal, PT   PT STG: Stairs  supervision required   PT STG: Number of Stairs  4      PT Care Plan Goals      Most Recent Value   Bed Mobility Goal   Date Goal Established: Bed Mobility  04/01/19   Time to Achieve Goal: Bed Mobility  5 days   Goal Activity: Bed Mobility  all bed mobility activities   Level of [place] Goal: Bed Mobility  independent   Gait Goal   Date Goal Established: Gait Training  04/01/19   Time to Achieve Goal: Gait Training  5 days   Level of [place]  supervision required   Assistive Device: Gait Training  walker, rolling   Distance Goal: Gait Training (feet)  250 feet   Goal: Gait Training  amb w/least AD    Transfer Goal   Date Goal Established: Transfer Training  04/01/19   Time to Achieve Goal: Transfer Training  5 days   Goal Activity: Transfer Training  bed to chair/chair to bed, sit to stand/stand to sit   Level of [place] Goal: Transfer Training  independent

## 2019-04-09 ENCOUNTER — APPOINTMENT (INPATIENT)
Dept: RADIOLOGY | Facility: HOSPITAL | Age: 59
DRG: 003 | End: 2019-04-09
Attending: PHYSICIAN ASSISTANT
Payer: COMMERCIAL

## 2019-04-09 LAB
ALBUMIN SERPL-MCNC: 2.7 G/DL (ref 3.4–5)
ALP SERPL-CCNC: 64 IU/L (ref 35–126)
ALT SERPL-CCNC: 31 IU/L (ref 16–63)
ANION GAP SERPL CALC-SCNC: 10 MEQ/L (ref 3–15)
APTT PPP: 67 SEC (ref 23–35)
AST SERPL-CCNC: 23 IU/L (ref 15–41)
BASE EXCESS BLDA CALC-SCNC: -0.8 MEQ/L
BILIRUB SERPL-MCNC: 0.5 MG/DL (ref 0.3–1.2)
BUN SERPL-MCNC: 12 MG/DL (ref 8–20)
CA-I BLD-SCNC: 1.11 MMOL/L (ref 1.15–1.27)
CALCIUM SERPL-MCNC: 7.9 MG/DL (ref 8.9–10.3)
CHLORIDE SERPL-SCNC: 109 MEQ/L (ref 98–109)
CO2 BLDA-SCNC: 25 MEQ/L (ref 22–32)
CO2 SERPL-SCNC: 20 MEQ/L (ref 22–32)
CREAT SERPL-MCNC: 1.1 MG/DL
ERYTHROCYTE [DISTWIDTH] IN BLOOD BY AUTOMATED COUNT: 15.4 % (ref 11.6–14.4)
GFR SERPL CREATININE-BSD FRML MDRD: >60 ML/MIN/1.73M*2
GLUCOSE BLDA-MCNC: 128 MG/DL (ref 65–95)
GLUCOSE SERPL-MCNC: 123 MG/DL (ref 70–99)
HCO3 BLDA-SCNC: 23 MEQ/L (ref 21–28)
HCT VFR BLDA CALC: 31 % (ref 36–48)
HCT VFR BLDCO AUTO: 28.4 %
HGB BLD-MCNC: 9.2 G/DL
INR PPP: 1.1 INR
LACTATE BLDA-SCNC: 0.5 MMOL/L (ref 0.4–1.6)
MAGNESIUM SERPL-MCNC: 2.3 MG/DL (ref 1.8–2.5)
MCH RBC QN AUTO: 28.8 PG (ref 28–33.2)
MCHC RBC AUTO-ENTMCNC: 32.4 G/DL (ref 32.2–36.5)
MCV RBC AUTO: 89 FL (ref 83–98)
PCO2 BLDA: 36 MM HG (ref 35–48)
PDW BLD AUTO: 10.1 FL (ref 9.4–12.4)
PH BLDA: 7.42 PH (ref 7.35–7.45)
PLATELET # BLD AUTO: 167 K/UL
PO2 BLDA: 71 MM HG (ref 83–100)
POCT PATIENT TEMPERATURE: 98.6 °F (ref 97–99)
POCT TEST (BLD GAS): ABNORMAL
POTASSIUM BLDA-SCNC: 4 MEQ/L (ref 3.4–4.5)
POTASSIUM SERPL-SCNC: 4 MEQ/L (ref 3.6–5.1)
PROT SERPL-MCNC: 4.8 G/DL (ref 6–8.2)
PROTHROMBIN TIME: 13.8 SEC (ref 12.2–14.5)
RBC # BLD AUTO: 3.19 M/UL (ref 4.5–5.8)
SAO2 % BLDA: 94 % (ref 93–98)
SAO2 % BLDV: 63.1 % (ref 30–60)
SODIUM BLDA-SCNC: 136 MEQ/L (ref 136–145)
SODIUM SERPL-SCNC: 139 MEQ/L (ref 136–144)
WBC # BLD AUTO: 13.19 K/UL

## 2019-04-09 PROCEDURE — 25000000 HC PHARMACY GENERAL: Performed by: PHYSICIAN ASSISTANT

## 2019-04-09 PROCEDURE — 63600000 HC DRUGS/DETAIL CODE: Performed by: CLINICAL NURSE SPECIALIST

## 2019-04-09 PROCEDURE — 99233 SBSQ HOSP IP/OBS HIGH 50: CPT | Performed by: ANESTHESIOLOGY

## 2019-04-09 PROCEDURE — 83735 ASSAY OF MAGNESIUM: CPT | Performed by: PHYSICIAN ASSISTANT

## 2019-04-09 PROCEDURE — 85730 THROMBOPLASTIN TIME PARTIAL: CPT | Performed by: ANESTHESIOLOGY

## 2019-04-09 PROCEDURE — 80053 COMPREHEN METABOLIC PANEL: CPT | Performed by: CLINICAL NURSE SPECIALIST

## 2019-04-09 PROCEDURE — 63600000 HC DRUGS/DETAIL CODE: Performed by: PHYSICIAN ASSISTANT

## 2019-04-09 PROCEDURE — 63700000 HC SELF-ADMINISTRABLE DRUG: Performed by: NURSE PRACTITIONER

## 2019-04-09 PROCEDURE — 63700000 HC SELF-ADMINISTRABLE DRUG: Performed by: CLINICAL NURSE SPECIALIST

## 2019-04-09 PROCEDURE — 63700000 HC SELF-ADMINISTRABLE DRUG: Performed by: PHYSICIAN ASSISTANT

## 2019-04-09 PROCEDURE — 71045 X-RAY EXAM CHEST 1 VIEW: CPT

## 2019-04-09 PROCEDURE — 85027 COMPLETE CBC AUTOMATED: CPT | Performed by: CLINICAL NURSE SPECIALIST

## 2019-04-09 PROCEDURE — 97116 GAIT TRAINING THERAPY: CPT | Mod: GP

## 2019-04-09 PROCEDURE — 36415 COLL VENOUS BLD VENIPUNCTURE: CPT | Performed by: CLINICAL NURSE SPECIALIST

## 2019-04-09 PROCEDURE — 82810 BLOOD GASES O2 SAT ONLY: CPT | Performed by: CLINICAL NURSE SPECIALIST

## 2019-04-09 PROCEDURE — 85610 PROTHROMBIN TIME: CPT | Performed by: NURSE PRACTITIONER

## 2019-04-09 PROCEDURE — 25800000 HC PHARMACY IV SOLUTIONS: Performed by: PHYSICIAN ASSISTANT

## 2019-04-09 PROCEDURE — 99233 SBSQ HOSP IP/OBS HIGH 50: CPT | Performed by: INTERNAL MEDICINE

## 2019-04-09 PROCEDURE — 94640 AIRWAY INHALATION TREATMENT: CPT

## 2019-04-09 PROCEDURE — 25000000 HC PHARMACY GENERAL: Performed by: NURSE PRACTITIONER

## 2019-04-09 PROCEDURE — 20000000 HC ROOM AND CARE ICU

## 2019-04-09 PROCEDURE — 99233 SBSQ HOSP IP/OBS HIGH 50: CPT | Performed by: PSYCHIATRY & NEUROLOGY

## 2019-04-09 RX ORDER — WARFARIN SODIUM 5 MG/1
5 TABLET ORAL ONCE
Status: COMPLETED | OUTPATIENT
Start: 2019-04-09 | End: 2019-04-09

## 2019-04-09 RX ORDER — ENOXAPARIN SODIUM 100 MG/ML
40 INJECTION SUBCUTANEOUS
Status: DISCONTINUED | OUTPATIENT
Start: 2019-04-09 | End: 2019-04-11 | Stop reason: HOSPADM

## 2019-04-09 RX ORDER — NAPROXEN 250 MG/1
250 TABLET ORAL 2 TIMES DAILY WITH MEALS
Status: DISCONTINUED | OUTPATIENT
Start: 2019-04-09 | End: 2019-04-11 | Stop reason: HOSPADM

## 2019-04-09 RX ORDER — MILRINONE LACTATE 0.2 MG/ML
0.12 INJECTION, SOLUTION INTRAVENOUS CONTINUOUS
Status: DISCONTINUED | OUTPATIENT
Start: 2019-04-09 | End: 2019-04-10

## 2019-04-09 RX ORDER — ENOXAPARIN SODIUM 100 MG/ML
40 INJECTION SUBCUTANEOUS
Qty: 10 SYRINGE | Refills: 0 | Status: SHIPPED | OUTPATIENT
Start: 2019-04-10 | End: 2019-04-15 | Stop reason: SDUPTHER

## 2019-04-09 RX ADMIN — Medication 10 ML: at 17:17

## 2019-04-09 RX ADMIN — SILDENAFIL 20 MG: 20 TABLET ORAL at 13:18

## 2019-04-09 RX ADMIN — HEPARIN SODIUM 2000 UNITS/HR: 10000 INJECTION, SOLUTION INTRAVENOUS at 00:23

## 2019-04-09 RX ADMIN — SALINE NASAL SPRAY 2 SPRAY: 1.5 SOLUTION NASAL at 08:56

## 2019-04-09 RX ADMIN — Medication 10 ML: at 00:26

## 2019-04-09 RX ADMIN — BENZONATATE 100 MG: 100 CAPSULE ORAL at 08:57

## 2019-04-09 RX ADMIN — LEVETIRACETAM 500 MG: 500 TABLET, FILM COATED ORAL at 20:14

## 2019-04-09 RX ADMIN — FAMOTIDINE 20 MG: 20 TABLET, FILM COATED ORAL at 20:14

## 2019-04-09 RX ADMIN — AMIODARONE HYDROCHLORIDE 400 MG: 200 TABLET ORAL at 08:51

## 2019-04-09 RX ADMIN — CETIRIZINE HYDROCHLORIDE 10 MG: 10 TABLET, FILM COATED ORAL at 21:53

## 2019-04-09 RX ADMIN — LEVETIRACETAM 500 MG: 500 TABLET, FILM COATED ORAL at 08:53

## 2019-04-09 RX ADMIN — SALINE NASAL SPRAY 2 SPRAY: 1.5 SOLUTION NASAL at 13:14

## 2019-04-09 RX ADMIN — ROSUVASTATIN CALCIUM 10 MG: 10 TABLET, FILM COATED ORAL at 17:15

## 2019-04-09 RX ADMIN — SALINE NASAL SPRAY 2 SPRAY: 1.5 SOLUTION NASAL at 21:54

## 2019-04-09 RX ADMIN — MAGNESIUM GLUCONATE 500 MG ORAL TABLET 400 MG: 500 TABLET ORAL at 08:54

## 2019-04-09 RX ADMIN — ASPIRIN 81 MG: 81 TABLET, COATED ORAL at 08:51

## 2019-04-09 RX ADMIN — SILDENAFIL 20 MG: 20 TABLET ORAL at 08:56

## 2019-04-09 RX ADMIN — SILDENAFIL 20 MG: 20 TABLET ORAL at 20:15

## 2019-04-09 RX ADMIN — POTASSIUM CHLORIDE 20 MEQ: 750 TABLET, FILM COATED, EXTENDED RELEASE ORAL at 08:55

## 2019-04-09 RX ADMIN — AMIODARONE HYDROCHLORIDE 400 MG: 200 TABLET ORAL at 20:13

## 2019-04-09 RX ADMIN — FOLIC ACID 1 MG: 1 TABLET ORAL at 08:52

## 2019-04-09 RX ADMIN — COLCHICINE 0.6 MG: 0.6 TABLET, FILM COATED ORAL at 08:51

## 2019-04-09 RX ADMIN — Medication 100 MG: at 08:57

## 2019-04-09 RX ADMIN — IPRATROPIUM BROMIDE AND ALBUTEROL SULFATE 3 ML: 2.5; .5 SOLUTION RESPIRATORY (INHALATION) at 14:46

## 2019-04-09 RX ADMIN — CALCIUM CHLORIDE 1 G: 100 INJECTION, SOLUTION INTRAVENOUS at 08:58

## 2019-04-09 RX ADMIN — IPRATROPIUM BROMIDE AND ALBUTEROL SULFATE 3 ML: 2.5; .5 SOLUTION RESPIRATORY (INHALATION) at 07:49

## 2019-04-09 RX ADMIN — SALINE NASAL SPRAY 2 SPRAY: 1.5 SOLUTION NASAL at 17:17

## 2019-04-09 RX ADMIN — MAGNESIUM GLUCONATE 500 MG ORAL TABLET 400 MG: 500 TABLET ORAL at 20:14

## 2019-04-09 RX ADMIN — CHLORHEXIDINE GLUCONATE 15 ML: 1.2 RINSE ORAL at 21:53

## 2019-04-09 RX ADMIN — ACETAMINOPHEN 975 MG: 325 TABLET ORAL at 00:25

## 2019-04-09 RX ADMIN — POTASSIUM CHLORIDE 20 MEQ: 750 TABLET, FILM COATED, EXTENDED RELEASE ORAL at 20:15

## 2019-04-09 RX ADMIN — FAMOTIDINE 20 MG: 20 TABLET, FILM COATED ORAL at 08:52

## 2019-04-09 RX ADMIN — MUPIROCIN 1 APPLICATION.: 20 OINTMENT TOPICAL at 20:15

## 2019-04-09 RX ADMIN — NAPROXEN 250 MG: 250 TABLET ORAL at 17:15

## 2019-04-09 RX ADMIN — ENOXAPARIN SODIUM 40 MG: 100 INJECTION SUBCUTANEOUS at 17:15

## 2019-04-09 RX ADMIN — Medication 10 ML: at 08:56

## 2019-04-09 RX ADMIN — IPRATROPIUM BROMIDE AND ALBUTEROL SULFATE 3 ML: 2.5; .5 SOLUTION RESPIRATORY (INHALATION) at 20:10

## 2019-04-09 RX ADMIN — FUROSEMIDE 40 MG: 10 INJECTION, SOLUTION INTRAMUSCULAR; INTRAVENOUS at 08:53

## 2019-04-09 RX ADMIN — WARFARIN SODIUM 5 MG: 5 TABLET ORAL at 17:15

## 2019-04-09 RX ADMIN — ENOXAPARIN SODIUM 40 MG: 100 INJECTION SUBCUTANEOUS at 13:15

## 2019-04-09 RX ADMIN — MULTIPLE VITAMINS W/ MINERALS TAB 1 TABLET: TAB at 08:54

## 2019-04-09 NOTE — PROGRESS NOTES
Neurology Progress Note    Subjective     Interval History: The patient was seen and examined this morning. He was sitting in the chair in no acute distress. No recurrent episodes of unresponsiveness. The patient did not have a headache today. No dizziness, speech difficulty, or visual disturbances. He reports that his right upper extremity strength is improving. No new neurological symptoms since last seen.    Objective     Vital signs in last 24 hours:  Temp:  [36.6 °C (97.8 °F)-36.9 °C (98.4 °F)] 36.9 °C (98.4 °F)  Heart Rate:  [72-98] 88  Resp:  [20] 20  BP: (118-170)/(56-80) 136/65    Physical Exam:  General Appearance: Awake, Not in Acute Distress.   Head: Normocephalic, atraumatic.   Eyes: Pupils were about equal and reacted to light.    Neck: Supple, no nuchal rigidity. No apparent bruits.   Respiratory: CTA.   Cardiovascular: Regular rate and rhythm in 80s.   Gastrointestinal: Soft, + Bowel sounds.   Extremities: Edema in his right hand and distal LEs.   Skin: Dry.   Neurologic:  AAOx3. Speech was clear. CN: Pupils were about equal and reacted to light. EOM grossly intact. No obvious nystagmus. No clear diplopia. No clear facial asymmetry. Tongue protruded midline. LT sensation was grossly normal bilaterally. Motor: No drift in LUE and B/L LEs. Mild drift in RUE. No obviously increased muscle tone. DTRs: About 2+ in UEs; Left knee jerk > right knee jerk. Toes were likely downgoing B/L. F to N was grossly intact B/L. Gait: Deferred.   Behavior/Emotional: Cooperative.     LABS:  CBC Results       04/09/19 04/08/19 04/08/19                    0538 1241 0521         WBC 13.19 (H) 16.08 (H) 14.22 (H)         RBC 3.19 (L) 3.36 (L) 3.42 (L)         HGB 9.2 (L) 9.9 (L) 9.6 (L)         HCT 28.4 (L) 29.9 (L) 30.4 (L)         MCV 89.0 89.0 88.9         MCH 28.8 29.5 28.1         MCHC 32.4 33.1 31.6 (L)          138 (L) 143 (L)                       BMP Results       04/09/19 04/08/19 04/07/19                     0538 0521 1603          139 134 (L)         K 4.0 4.3 3.9         Cl 109 109 103         CO2 20 (L) 21 (L) 20 (L)         Glucose 123 (H) 125 (H) 155 (H)         BUN 12 12 16         Creatinine 1.1 1.1 1.1         Calcium 7.9 (L) 7.9 (L) 8.0 (L)         Anion Gap 10 9 11         EGFR &gt;60.0 &gt;60.0 &gt;60.0                     Reviewed lab results.    ECG/ECHO:  Transthoracic echo (TTE) limited with Doppler and color 04/08/2019:  Interpretation Summary      Technically limited study.  Limited study for right ventricular function.     1. Normal cavity size and preserved systolic function.  Abnormal septal motion.  Unable to fully assess regional wall motion abnormalities or wall thickness given limited study.  2. Mildly dilated right ventricular size with mildly reduced systolic function.  Protek-Duo Catheter is visualized in the right-sided cardiac structures.  3. Grossly normal aortic valve.   4. Thickened mitral leaflets. Status post mitral valve repair with annuloplasty ring and neocords with residual mild mitral regurgiation.   5. Mildly dilated left atrium.  6. Insufficient tricuspid regurgitation to determine RVSP.  7. Pulmonic valve not well visualized  8. Small pericardial effusion.  9.   Compared to previous study dated 4/4/2019, right ventricular function has improved.  10. Discussed with Amelia.      Ultrasound carotid bilateral 03/21/2019:  1-15% of stenosis of both ICAs.    IMAGING:  Outside studies from [facility]:  MRI of brain with and without contrast 10/10/2018:  Increase in size of the metastasis within the anterior right frontal lobe with interval development of 0.6 cm leftward midline shift. Remainder of the intracranial metastases have decreased in size. No new intracranial metastatic disease.     MRI of brain with and without contrast 12/06/2018:  Impression:  1. A heterogenerously enhancing metastatic mass in the right frontal lobe with cystic changes showing  interval decrease in size. There is interval decrease in associated vasogenic edema. There is no mass-effect or midline shift.    2. Two subcentimeter metastatic nodules stable from prior exam. No new metastatic lesions are seen in the brain.    3. Mild changes of chronic microangiopathy.  4. Mild bilateral mastoiditis.    OTHER TESTS:  EEG 04/08/2019:  IMPRESSION:  This study captured during the awake, drowsy and asleep state was normal.     CLINICAL CORRELATION:    There were no epileptiform discharges nor electrographic seizures seen.  A normal study does not exclude the possibility of epilepsy. Clinical correlation is advised.    Assessment and Plan:    Assessment   58 y.o. male with a past medical history of hypertension, hyperlipidemia, CHF, mitral regurgitation, and stage IV lung cancer with brain metastases underwent minimally invasive mitral valve repair on 03/29/2019. He is being consulted for an unresponsive episode that occurred on 04/07/2019. Seizure versus convulsive syncope in the setting of severe cough. Rule out acute intracranial process. Rule out new metastatic lesions. EEG on 04/08/2019 was normal. There were neither epileptiform discharges nor electrographic seizures seen. No recurrent episodes of unresponsiveness.     Plan   -The patient declined MRI brain or head CT studies at this time. He would like to have MRI brain with and without contrast in 05/2019 and will follow with his oncologist.   -Continue Keppra 500 mg BID.  -The patient should not drive for now. The patient should follow up with neurology after hospital discharge.  -Medical and supportive care per CTICU team.   -Care plan was discussed with the patient and CTICU team.     Face to face patient counseling and coordinating care > 50% encounter time.  Total encounter time: 35 minutes.    Maddie Lynn MD   4/9/2019

## 2019-04-09 NOTE — PLAN OF CARE
Problem: Patient Care Overview  Goal: Plan of Care Review  Outcome: Ongoing (interventions implemented as appropriate)   04/08/19 1641 04/09/19 1034   Coping/Psychosocial   Plan Of Care Reviewed With --  patient;spouse   Plan of Care Review   Progress --  progress toward functional goals as expected   Outcome Summary Pt progressing towards goals set by PT, continue per PT POC.  --        Problem: Cardiac Surgery (Adult)  Goal: Signs and Symptoms of Listed Potential Problems Will be Absent, Minimized or Managed (Cardiac Surgery)  Outcome: Ongoing (interventions implemented as appropriate)      Problem: Acute Therapy Services Goal & Intervention Plan  Goal: Gait Training Goal  Outcome: Outcome(s) Achieved Date Met: 04/09/19

## 2019-04-09 NOTE — PROGRESS NOTES
Cardiology Progress Note    SUBJECTIVE  He was feeling well this morning with good cardiac output by Kameron equation so we lowered milrinone to 0.125 mcg/kg/min.  I revisited now in the mid afternoon and he continues to do quite well.  He and his wife tell me he walked the equivalent of 2 blocks.  Denies chest pain or shortness of breath with this.    Inpatient medications:  •  acetaminophen, 975 mg, oral, q6h RIGOBERTO  •  albumin human, 500 mL, intravenous, PRN  •  alum-mag hydroxide-simeth, 30 mL, oral, q4h PRN  •  amiodarone, 400 mg, oral, BID  •  aspirin, 81 mg, oral, Daily  •  atropine, 0.5 mg, intravenous, q5 min PRN  •  benzocaine-menthol, 1 lozenge, Mouth/Throat, q2h PRN  •  benzonatate, 100 mg, oral, 3x daily PRN  •  bisacodyl, 10 mg, rectal, Daily PRN  •  calcium chloride, 1 g, intravenous, q6h PRN  •  camphor-methyl salicyl-menthol, , Topical, PRN  •  cetirizine, 10 mg, oral, Nightly  •  chlorhexidine, 15 mL, Mouth/Throat, 2 times per day  •  colchicine, 0.6 mg, oral, Daily  •  [DISCONTINUED] insulin, 0-15 Units/hr, intravenous, Titrated **AND** dextrose in water, 10-30 mL, intravenous, PRN  •  diphenhydrAMINE, 25 mg, oral, q6h PRN **OR** diphenhydrAMINE, 25 mg, intravenous, q6h PRN  •  docusate sodium, 100 mg, oral, BID  •  enoxaparin, 40 mg, subcutaneous, q12h (6a, 6p)  •  [DISCONTINUED] famotidine, 20 mg, intravenous, BID **OR** famotidine, 20 mg, oral, BID  •  folic acid, 1 mg, oral, Daily  •  furosemide, 40 mg, intravenous, Daily  •  ipratropium-albuterol, 3 mL, nebulization, q6h RIGOBERTO  •  levETIRAcetam, 500 mg, oral, BID  •  lorlatinib, 75 mg, oral, Daily  •  magnesium oxide, 400 mg, oral, BID  •  magnesium sulfate, 2 g, intravenous, PRN  •  melatonin, 6 mg, oral, Nightly PRN  •  midazolam, 2 mg, intravenous, q4h PRN  •  milrinone, 0.125 mcg/kg/min, intravenous, Continuous  •  multivitamin, 1 tablet, oral, Daily  •  mupirocin, 1 application, Topical, TID  •  naproxen, 250 mg, oral, BID with meals  •   ondansetron ODT, 4 mg, oral, q8h PRN **OR** ondansetron, 4 mg, intravenous, q8h PRN  •  oxyCODONE, 10 mg, oral, q6h PRN  •  oxyCODONE, 5 mg, oral, q4h PRN  •  potassium chloride, 20 mEq, oral, BID  •  potassium chloride, 20 mEq, intravenous, q8h PRN  •  rosuvastatin, 10 mg, oral, Daily (6p)  •  senna, 1 tablet, oral, 2x daily PRN  •  senna, 1 tablet, oral, BID  •  sildenafil (antihypertensive), 20 mg, oral, TID  •  sodium bicarbonate, 45-90 mEq, intravenous, PRN  •  sodium chloride, 2 spray, Each Nostril, QID  •  sodium chloride 0.9 %, , intravenous, Continuous  •  sodium chloride, 10 mL, intravenous, q8h INT  •  sodium chloride, 10 mL, intravenous, PRN  •  thiamine, 100 mg, oral, Daily  •  warfarin, 5 mg, oral, Once    Review of Systems: Review of systems otherwise normal.    OBJECTIVE:   /69 (BP Location: Right upper arm, Patient Position: Lying)   Pulse 83   Temp 36.9 °C (98.4 °F) (Temporal)   Resp 18   Ht 1.829 m (6')   Wt 116 kg (255 lb 11.7 oz)   SpO2 95%   BMI 34.68 kg/m²       Intake/Output Summary (Last 24 hours) at 04/09/19 1457  Last data filed at 04/09/19 1400   Gross per 24 hour   Intake          1096.56 ml   Output             1400 ml   Net          -303.44 ml       Physical Exam   Constitutional: Well-developed and well-nourished. No distress.   HENT: Normocephalic and atraumatic. Moist mucous membranes.  Eyes: Sclera anicteric.  Cardiovascular: Normal rate and regular rhythm.   Pulmonary/Chest: Normal effort and air entry.   Abdominal: Soft, non tender, no distension.   Musculoskeletal: trace LE edema  Neurological: Alert and oriented to person, place, and time.   Skin: Skin is warm and dry.   Psychiatric: Normal mood, affect and behavior.       Labs    Results from last 7 days  Lab Units 04/09/19  0538 04/08/19  0521 04/07/19  1603 04/07/19  0529   SODIUM mEQ/L 139 139 134* 139   POTASSIUM mEQ/L 4.0 4.3 3.9 4.0   CHLORIDE mEQ/L 109 109 103 106   CO2 mEQ/L 20* 21* 20* 21*   BUN mg/dL 12  12 16 15   CREATININE mg/dL 1.1 1.1 1.1 1.1   CALCIUM mg/dL 7.9* 7.9* 8.0* 7.9*   ALBUMIN g/dL 2.7* 2.7*  --  2.9*   BILIRUBIN TOTAL mg/dL 0.5 0.7  --  0.7   ALK PHOS IU/L 64 66  --  67   ALT IU/L 31 37  --  43   AST IU/L 23 30  --  35   GLUCOSE mg/dL 123* 125* 155* 115*       Results from last 7 days  Lab Units 04/09/19  0538 04/08/19  1241 04/08/19  0521   WBC K/uL 13.19* 16.08* 14.22*   HEMOGLOBIN g/dL 9.2* 9.9* 9.6*   HEMATOCRIT % 28.4* 29.9* 30.4*   PLATELETS K/uL 167 138* 143*             Cardiology results    TTE:   Cardiac Imaging   TRANSTHORACIC ECHO (TTE) LIMITED 04/04/2019    Narrative Technically limited study.  Limited study for right ventricular function.    1. Mild concentric left ventricular hypertrophy with normal cavity size   and preserved systolic function.  Abnormal septal motion.  Unable to fully   assess regional wall motion abnormalities given limited study.  2. Moderately dilated right ventricular size with severely reduced   systolic function (TAPSE 1.11 cm).  Although right ventricular function   appears severely reduced, it appears somewhat improved from previous study   on 4/2/2019. Protek-Duo Catheter is visualized in the right-sided cardiac   structures.  3. Grossly normal aortic valve.   4. Thickened mitral leaflets. Status post mitral valve repair with   annuloplasty ring. Mild mitral regurgitation.  5. Mildly dilated left atrium.  6. Mild tricuspid regurgitation with insufficient jet to determine RVSP.  7. Mild pulmonic regurgitation.  8. Small pericardial effusion.  9. Compared to previous study dated 4/2/2019, right ventricular function   appears slightly improved.          TRANSTHORACIC ECHO (TTE) LIMITED 04/02/2019    Narrative Limited study for RVAD wean  1. Mild concentric left ventricular hypertrophy with normal cavity size   and preserved systolic function. Cannot adequately assess regional wall   motion abnormalities. Abnormal septal motion.  2. Moderately dilated right  ventricular size with severely decreased   systolic function (TAPSE 0.75-0.8cm).  The right ventricular outflow tract   however displays some contraction.  Blancas sign is also present.    Protek-Duo Catheter is visualized in the right-sided cardiac structures.   During wean of RVAD there is a slight increase in right ventricular size   without appreciable change in RV function by TAPSE.   3. Grossly normal aortic valve.   4. Thickened mitral leaflets. Status post mitral valve repair with   annuloplasty ring. No obvious mitral regurgitation on the limited views   obtained.  5. Mildly dilated left atrium.  6. Mild tricuspid regurgitation.  7. Trace posterior pericardial effusion.     Echo 4/8/19:  Technically limited study.  Limited study for right ventricular function.  1. Normal cavity size and preserved systolic function.  Abnormal septal motion.  Unable to fully assess regional wall motion abnormalities or wall thickness given limited study.  2. Mildly dilated right ventricular size with mildly reduced systolic function.  Protek-Duo Catheter is visualized in the right-sided cardiac structures.  3. Grossly normal aortic valve.   4. Thickened mitral leaflets. Status post mitral valve repair with annuloplasty ring and neocords with residual mild mitral regurgiation.   5. Mildly dilated left atrium.  6. Insufficient tricuspid regurgitation to determine RVSP.  7. Pulmonic valve not well visualized  8. Small pericardial effusion.  9.   Compared to previous study dated 4/4/2019, right ventricular function has improved.  10. Discussed with Amelia.    Imaging: Imaging reviewed.    ASSESSMENT AND PLAN  58 y.o. male admitted for Mitral valve insufficiency, unspecified etiology [I34.0]  Mitral regurgitation [I34.0], Cardiology consulted for:    Right ventricular dysfunction   Assessment & Plan    Management per CT surgery with continued mechanical support until 4/8. Now on support with Sildenafil. Continue to follow central  venous sats as a marker of CO.      Pulmonary hypertension (CMS/HCC) (HCC)   Assessment & Plan    Patient known to have mild pulmonary hypertension based on RHC prior to admission (mean PA 28 mmHg and PWCP of 14 mmHg). His hospital course has been complicated by RV failure requiring mechanical support. Explanted 4/8 and tolerating well thus far. Continue Sildenafil 20 mg TID to help reduce RV afterload. Decreased Milrinone to 0.125mcg/kg/min, and would turn it off for now. We will consider weaning Sildenafil tomorrow. Unclear if we will need to send home on Sildenafil but would begin the prior auth process.     * Mitral valve insufficiency   Assessment & Plan    He is status post mitral valve repair. Management per CT surgery.        D/W primary team this morning and afternoon.      Jed Klein MD  4/9/2019  2:57 PM

## 2019-04-09 NOTE — PROGRESS NOTES
Discussed in rounds this am. RVAD removed yesterday. Weaning Milrinone. Will be on Coumadin when discharged. May need Lovenox bridge at home. Requested written Rx from [facility]. Will follow to assist with transitions of care.

## 2019-04-09 NOTE — PLAN OF CARE
Problem: Patient Care Overview  Goal: Plan of Care Review  Outcome: Ongoing (interventions implemented as appropriate)   04/08/19 4486   Coping/Psychosocial   Plan Of Care Reviewed With patient   Plan of Care Review   Progress progress toward functional goals as expected       Problem: Pressure Ulcer Risk (Justin Scale) (Adult,Obstetrics,Pediatric)  Goal: Skin Integrity  Outcome: Ongoing (interventions implemented as appropriate)      Problem: Fall Risk (Adult)  Goal: Absence of Falls  Outcome: Ongoing (interventions implemented as appropriate)      Problem: Cardiac Surgery (Adult)  Goal: Signs and Symptoms of Listed Potential Problems Will be Absent, Minimized or Managed (Cardiac Surgery)  Outcome: Ongoing (interventions implemented as appropriate)

## 2019-04-09 NOTE — PROGRESS NOTES
Cardiothoracic Intensivist Progress Note       CARDIOTHORACIC   Post-Operative Day # 11     Subjective     History of Present Illness: Wesley Fields is a 58 y.o. cisgender male with history of decreased exercise capacity prior to OR s/p MVR, chronic HTN, and hyperlipidemia that are being treated.     Review of Systems:                                     Constitutional: no acute distress                                                  Eyes: denies difficulty with vision  Ears, nose, mouth, throat, and face: denies oral discomfort                                        Respiratory: denies shortness of breath                                  Cardiovascular: mild chest discomfort at access site                                 Gastrointestinal: denies abdominal pain                                    Genitourinary: denies difficulty voiding                                      Hematologic: denies bleeding issues                                Musculoskeletal: denies muscle pain, right hand swelling                                      Neurological: generalized weakness                                   Integumentary: denies rash    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    Social History:   Social History     Social History   • Marital status:      Spouse name: N/A   • Number of  children: 4   • Years of education: N/A     Social History Main Topics   • Smoking status: Never Smoker   • Smokeless tobacco: Never Used   • Alcohol use Yes      Comment: 2-3 beers/day   • Drug use: No   • Sexual activity: Not Asked     Other Topics Concern   • None     Social History Narrative    Patient lives with his wife in a 2 story home-bathroom on 2nd floor       Family History:   Family History   Problem Relation Age of Onset   • COPD Mother    • Multiple myeloma Mother    • Lung cancer Father    • Cancer Sister         thyroid       Objective     Vital signs in last 24 hours:  Temp:  [36.6 °C (97.8 °F)-36.7 °C (98.1 °F)] 36.7 °C (98 °F)  Heart Rate:  [72-91] 91  BP: (118-147)/(56-68) 135/65      Intake/Output Summary (Last 24 hours) at 04/09/19 0921  Last data filed at 04/09/19 0815   Gross per 24 hour   Intake           694.22 ml   Output             2025 ml   Net         -1330.78 ml     Intake/Output this shift:  I/O this shift:  In: 72.7 [I.V.:72.7]  Out: 750 [Urine:750]    Labs  Lab Results   Component Value Date    WBC 13.19 (H) 04/09/2019    HGB 9.2 (L) 04/09/2019    HCT 28.4 (L) 04/09/2019     04/09/2019    ALT 31 04/09/2019    AST 23 04/09/2019     04/09/2019    K 4.0 04/09/2019     04/09/2019    CREATININE 1.1 04/09/2019    BUN 12 04/09/2019    CO2 20 (L) 04/09/2019    MG 2.3 04/09/2019    PHOS 4.1 04/08/2019    INR 1.1 04/09/2019    HGBA1C 5.6 03/29/2019    PT 13.8 04/09/2019    PTT 67 (H) 04/09/2019       Full Code    Head/Ear/Nose/Throat:     NCAT.                               Eyes:     PERRL.                     Respiratory:     diminished at the bases b/l.               Cardiovascular:     SR.              Gastrointestinal:     + bowel sounds.                 Genitourinary:     No deformities.                    Extremities:     trace edema b/l lower extremities.              Musculoskeletal:      No injury or deformity.                   Neurological:     Follows  commands.       Behavior/Emotional:     Cooperative.                           Lymph:      No adenopathy noted.                               Skin:      Clean, dry and intact.     Examination of the patient performed at the bedside. Rounded with ICU team and discussed management/plan with surgical staff. Medications, radiological studies and labs reviewed and discussed with attending of record, Dr. Georgi Pepe.    Cardiothoracic Assessment and Plan:    Neurologic: A&Ox3 and pain controlled. Had questionable seizure activity/syncopal episode 4/7 which resolved spontaneously; no post-ictal period or change in hemodynamics. History of metastatic lung cancer to brain which has since been treated. Unclear if brain metastasis could have had any contribution to neuro process. EEG inconclusive. Neurology recommending CT scan of the head but Pt is refusing at this time. Melatonin for sleep.     Cardiovascular: Severe MR s/p MV repair s/p cardiogenic shock with right heart failure, also with chronic diastolic CHF with a preserved EF and hypertension. RVAD placed intra-op due to worsening RV function and was removed 4/8. Currently on milrinone for inotropic support. Most recent TTE showed improvement of RV function. Responded to cardioversion and no longer in a-fib. Will monitor MVO2/CO, wean milrinone and continue to optimize cardiac medications. Appreciate Cardiology input. Continue statin for dyslipidemia.     Respiratory: I personally reviewed the most recent CXR which portrayed b/l pulmonary congestion, mild clearing of right pleural effusion and small lung volumes compared to previous exam. Will consult IR for possible drainage if becomes any worse or begins to effect pulmonary mechanics. Will encourage IS, mobilization and wean O2 PRN.      Genitourinary: No gambino. Creatinine trending down. Making adequate urine output. Will avoid nephrotoxic medications. Goal for net negative fluid balance.     Endocrine: Follow  FSBG.     Hematologic: No evidence active bleeding. Bridging to coumadin using lovenox. Acute (expected) blood loss anemia from surgery. Heme/Onc evaluating most recent CT scan to make sure metastatic lesions are not worse compared to previous imaging.     Infectious Disease: No indication for antibiotics at this time.     Fluids, Electrolytes: Electrolytes replaced per protocol. Goal for net negative fluid balance.     Gastrointestinal: PO as tolerated. GI ppx. LFTs have trended down.     Skin: OOB, PT. A-line removed from right hand and no evidence DVT based on imaging; swelling has decreased.     Tubes, lines and drains: Will remove superfluous invasive monitors PRN.    Disposition: Guarded.  Right heart failure and inotropic support s/p major invasive cardiac surgery. Continue ICU care.           Antonio Lafleur,   4/9/2019

## 2019-04-10 ENCOUNTER — APPOINTMENT (INPATIENT)
Dept: RADIOLOGY | Facility: HOSPITAL | Age: 59
DRG: 003 | End: 2019-04-10
Attending: NURSE PRACTITIONER
Payer: COMMERCIAL

## 2019-04-10 ENCOUNTER — APPOINTMENT (INPATIENT)
Dept: RADIOLOGY | Facility: HOSPITAL | Age: 59
DRG: 003 | End: 2019-04-10
Attending: PHYSICIAN ASSISTANT
Payer: COMMERCIAL

## 2019-04-10 LAB
ALBUMIN SERPL-MCNC: 2.9 G/DL (ref 3.4–5)
ALP SERPL-CCNC: 66 IU/L (ref 35–126)
ALT SERPL-CCNC: 31 IU/L (ref 16–63)
ANION GAP SERPL CALC-SCNC: 12 MEQ/L (ref 3–15)
APTT PPP: 34 SEC (ref 23–35)
AST SERPL-CCNC: 24 IU/L (ref 15–41)
BILIRUB SERPL-MCNC: 0.6 MG/DL (ref 0.3–1.2)
BUN SERPL-MCNC: 12 MG/DL (ref 8–20)
CALCIUM SERPL-MCNC: 8.5 MG/DL (ref 8.9–10.3)
CHLORIDE SERPL-SCNC: 109 MEQ/L (ref 98–109)
CO2 SERPL-SCNC: 21 MEQ/L (ref 22–32)
CREAT SERPL-MCNC: 1.1 MG/DL
CROSSMATCH: NORMAL
ERYTHROCYTE [DISTWIDTH] IN BLOOD BY AUTOMATED COUNT: 15.7 % (ref 11.6–14.4)
GFR SERPL CREATININE-BSD FRML MDRD: >60 ML/MIN/1.73M*2
GLUCOSE SERPL-MCNC: 109 MG/DL (ref 70–99)
HCT VFR BLDCO AUTO: 29.8 %
HGB BLD-MCNC: 9.6 G/DL
INR PPP: 1.2 INR
ISBT CODE: 5100
MAGNESIUM SERPL-MCNC: 2.3 MG/DL (ref 1.8–2.5)
MCH RBC QN AUTO: 28.8 PG (ref 28–33.2)
MCHC RBC AUTO-ENTMCNC: 32.2 G/DL (ref 32.2–36.5)
MCV RBC AUTO: 89.5 FL (ref 83–98)
PDW BLD AUTO: 9.7 FL (ref 9.4–12.4)
PLATELET # BLD AUTO: 209 K/UL
POTASSIUM SERPL-SCNC: 4.4 MEQ/L (ref 3.6–5.1)
PRODUCT CODE: NORMAL
PRODUCT STATUS: NORMAL
PROT SERPL-MCNC: 5 G/DL (ref 6–8.2)
PROTHROMBIN TIME: 14.4 SEC (ref 12.2–14.5)
RBC # BLD AUTO: 3.33 M/UL (ref 4.5–5.8)
SAO2 % BLDV: 49.4 % (ref 30–60)
SAO2 % BLDV: 57.5 % (ref 30–60)
SAO2 % BLDV: 66 % (ref 30–60)
SODIUM SERPL-SCNC: 142 MEQ/L (ref 136–144)
SPECIMEN EXP DATE BLD: NORMAL
UNIT ABO: NORMAL
UNIT ID: NORMAL
UNIT RH: POSITIVE
WBC # BLD AUTO: 12.52 K/UL

## 2019-04-10 PROCEDURE — 63700000 HC SELF-ADMINISTRABLE DRUG: Performed by: NURSE PRACTITIONER

## 2019-04-10 PROCEDURE — 97116 GAIT TRAINING THERAPY: CPT | Mod: GP

## 2019-04-10 PROCEDURE — 63700000 HC SELF-ADMINISTRABLE DRUG: Performed by: PHYSICIAN ASSISTANT

## 2019-04-10 PROCEDURE — 82810 BLOOD GASES O2 SAT ONLY: CPT | Performed by: CLINICAL NURSE SPECIALIST

## 2019-04-10 PROCEDURE — 21400000 HC ROOM AND CARE CCU/INTERMEDIATE

## 2019-04-10 PROCEDURE — 63700000 HC SELF-ADMINISTRABLE DRUG: Performed by: CLINICAL NURSE SPECIALIST

## 2019-04-10 PROCEDURE — 25000000 HC PHARMACY GENERAL: Performed by: NURSE PRACTITIONER

## 2019-04-10 PROCEDURE — 85610 PROTHROMBIN TIME: CPT | Performed by: NURSE PRACTITIONER

## 2019-04-10 PROCEDURE — 63600000 HC DRUGS/DETAIL CODE: Performed by: PHYSICIAN ASSISTANT

## 2019-04-10 PROCEDURE — 82810 BLOOD GASES O2 SAT ONLY: CPT | Performed by: NURSE PRACTITIONER

## 2019-04-10 PROCEDURE — 71045 X-RAY EXAM CHEST 1 VIEW: CPT

## 2019-04-10 PROCEDURE — 36415 COLL VENOUS BLD VENIPUNCTURE: CPT | Performed by: CLINICAL NURSE SPECIALIST

## 2019-04-10 PROCEDURE — 80053 COMPREHEN METABOLIC PANEL: CPT | Performed by: CLINICAL NURSE SPECIALIST

## 2019-04-10 PROCEDURE — 99233 SBSQ HOSP IP/OBS HIGH 50: CPT | Performed by: ANESTHESIOLOGY

## 2019-04-10 PROCEDURE — 71046 X-RAY EXAM CHEST 2 VIEWS: CPT

## 2019-04-10 PROCEDURE — 85730 THROMBOPLASTIN TIME PARTIAL: CPT | Performed by: ANESTHESIOLOGY

## 2019-04-10 PROCEDURE — 99233 SBSQ HOSP IP/OBS HIGH 50: CPT | Performed by: INTERNAL MEDICINE

## 2019-04-10 PROCEDURE — 85027 COMPLETE CBC AUTOMATED: CPT | Performed by: CLINICAL NURSE SPECIALIST

## 2019-04-10 PROCEDURE — 94640 AIRWAY INHALATION TREATMENT: CPT

## 2019-04-10 PROCEDURE — 83735 ASSAY OF MAGNESIUM: CPT | Performed by: PHYSICIAN ASSISTANT

## 2019-04-10 RX ORDER — HYDROMORPHONE HYDROCHLORIDE 1 MG/ML
.25-.5 INJECTION, SOLUTION INTRAMUSCULAR; INTRAVENOUS; SUBCUTANEOUS
Status: DISCONTINUED | OUTPATIENT
Start: 2019-04-10 | End: 2019-04-10

## 2019-04-10 RX ORDER — OXYCODONE HYDROCHLORIDE 5 MG/1
5-10 TABLET ORAL EVERY 4 HOURS PRN
Status: DISCONTINUED | OUTPATIENT
Start: 2019-04-10 | End: 2019-04-10

## 2019-04-10 RX ORDER — FUROSEMIDE 40 MG/1
40 TABLET ORAL DAILY
Status: DISCONTINUED | OUTPATIENT
Start: 2019-04-10 | End: 2019-04-11

## 2019-04-10 RX ORDER — WARFARIN SODIUM 5 MG/1
5 TABLET ORAL ONCE
Status: COMPLETED | OUTPATIENT
Start: 2019-04-10 | End: 2019-04-10

## 2019-04-10 RX ADMIN — AMIODARONE HYDROCHLORIDE 400 MG: 200 TABLET ORAL at 08:42

## 2019-04-10 RX ADMIN — IPRATROPIUM BROMIDE AND ALBUTEROL SULFATE 3 ML: 2.5; .5 SOLUTION RESPIRATORY (INHALATION) at 20:55

## 2019-04-10 RX ADMIN — IPRATROPIUM BROMIDE AND ALBUTEROL SULFATE 3 ML: 2.5; .5 SOLUTION RESPIRATORY (INHALATION) at 15:11

## 2019-04-10 RX ADMIN — Medication 10 ML: at 18:38

## 2019-04-10 RX ADMIN — Medication 10 ML: at 08:47

## 2019-04-10 RX ADMIN — LEVETIRACETAM 500 MG: 500 TABLET, FILM COATED ORAL at 08:44

## 2019-04-10 RX ADMIN — FAMOTIDINE 20 MG: 20 TABLET, FILM COATED ORAL at 20:54

## 2019-04-10 RX ADMIN — FAMOTIDINE 20 MG: 20 TABLET, FILM COATED ORAL at 08:42

## 2019-04-10 RX ADMIN — WARFARIN SODIUM 5 MG: 5 TABLET ORAL at 20:56

## 2019-04-10 RX ADMIN — Medication 100 MG: at 08:43

## 2019-04-10 RX ADMIN — FUROSEMIDE 40 MG: 40 TABLET ORAL at 08:42

## 2019-04-10 RX ADMIN — NAPROXEN 250 MG: 250 TABLET ORAL at 08:43

## 2019-04-10 RX ADMIN — Medication 10 ML: at 01:15

## 2019-04-10 RX ADMIN — ENOXAPARIN SODIUM 40 MG: 100 INJECTION SUBCUTANEOUS at 18:25

## 2019-04-10 RX ADMIN — LEVETIRACETAM 500 MG: 500 TABLET, FILM COATED ORAL at 20:55

## 2019-04-10 RX ADMIN — AMIODARONE HYDROCHLORIDE 400 MG: 200 TABLET ORAL at 20:54

## 2019-04-10 RX ADMIN — ROSUVASTATIN CALCIUM 10 MG: 10 TABLET, FILM COATED ORAL at 18:35

## 2019-04-10 RX ADMIN — BENZONATATE 100 MG: 100 CAPSULE ORAL at 18:36

## 2019-04-10 RX ADMIN — SALINE NASAL SPRAY 2 SPRAY: 1.5 SOLUTION NASAL at 15:11

## 2019-04-10 RX ADMIN — BENZONATATE 100 MG: 100 CAPSULE ORAL at 08:46

## 2019-04-10 RX ADMIN — CETIRIZINE HYDROCHLORIDE 10 MG: 10 TABLET, FILM COATED ORAL at 21:31

## 2019-04-10 RX ADMIN — FOLIC ACID 1 MG: 1 TABLET ORAL at 08:45

## 2019-04-10 RX ADMIN — MUPIROCIN 1 APPLICATION.: 20 OINTMENT TOPICAL at 08:45

## 2019-04-10 RX ADMIN — COLCHICINE 0.6 MG: 0.6 TABLET, FILM COATED ORAL at 08:44

## 2019-04-10 RX ADMIN — POTASSIUM CHLORIDE 20 MEQ: 750 TABLET, FILM COATED, EXTENDED RELEASE ORAL at 20:56

## 2019-04-10 RX ADMIN — CHLORHEXIDINE GLUCONATE 15 ML: 1.2 RINSE ORAL at 21:31

## 2019-04-10 RX ADMIN — SALINE NASAL SPRAY 2 SPRAY: 1.5 SOLUTION NASAL at 21:31

## 2019-04-10 RX ADMIN — POTASSIUM CHLORIDE 20 MEQ: 750 TABLET, FILM COATED, EXTENDED RELEASE ORAL at 08:43

## 2019-04-10 RX ADMIN — SILDENAFIL 20 MG: 20 TABLET ORAL at 08:42

## 2019-04-10 RX ADMIN — IPRATROPIUM BROMIDE AND ALBUTEROL SULFATE 3 ML: 2.5; .5 SOLUTION RESPIRATORY (INHALATION) at 08:36

## 2019-04-10 RX ADMIN — ENOXAPARIN SODIUM 40 MG: 100 INJECTION SUBCUTANEOUS at 06:05

## 2019-04-10 RX ADMIN — MULTIPLE VITAMINS W/ MINERALS TAB 1 TABLET: TAB at 08:45

## 2019-04-10 RX ADMIN — MAGNESIUM GLUCONATE 500 MG ORAL TABLET 400 MG: 500 TABLET ORAL at 20:55

## 2019-04-10 RX ADMIN — NAPROXEN 250 MG: 250 TABLET ORAL at 18:37

## 2019-04-10 RX ADMIN — ASPIRIN 81 MG: 81 TABLET, COATED ORAL at 08:43

## 2019-04-10 RX ADMIN — MAGNESIUM GLUCONATE 500 MG ORAL TABLET 400 MG: 500 TABLET ORAL at 08:42

## 2019-04-10 ASSESSMENT — COGNITIVE AND FUNCTIONAL STATUS - GENERAL
STANDING UP FROM CHAIR USING ARMS: 3 - A LITTLE
WALKING IN HOSPITAL ROOM: 3 - A LITTLE
CLIMB 3 TO 5 STEPS WITH RAILING: 3 - A LITTLE
MOVING TO AND FROM BED TO CHAIR: 3 - A LITTLE

## 2019-04-10 NOTE — PROGRESS NOTES
Cardiothoracic Intensivist Progress Note       CARDIOTHORACIC   Post-Operative Day # 12     Subjective     History of Present Illness: Wesley Fields is a 58 y.o. cisgender male with history of decreased exercise capacity prior to OR s/p MVR, chronic HTN, and hyperlipidemia that are being treated.     Review of Systems:                                     Constitutional: no acute distress                                                  Eyes: denies difficulty with vision  Ears, nose, mouth, throat, and face: denies oral discomfort                                        Respiratory: denies shortness of breath                                  Cardiovascular: denies chest discomfort                                  Gastrointestinal: denies abdominal pain                                    Genitourinary: denies difficulty voiding                                      Hematologic: denies bleeding issues                                Musculoskeletal: denies muscle pain, right hand swelling                                      Neurological: generalized weakness                                   Integumentary: denies rash    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    Social History:   Social History     Social History   • Marital status:      Spouse name: N/A   • Number of children: 4   •  Years of education: N/A     Social History Main Topics   • Smoking status: Never Smoker   • Smokeless tobacco: Never Used   • Alcohol use Yes      Comment: 2-3 beers/day   • Drug use: No   • Sexual activity: Not Asked     Other Topics Concern   • None     Social History Narrative    Patient lives with his wife in a 2 story home-bathroom on 2nd floor       Family History:   Family History   Problem Relation Age of Onset   • COPD Mother    • Multiple myeloma Mother    • Lung cancer Father    • Cancer Sister         thyroid       Objective     Vital signs in last 24 hours:  Temp:  [36.7 °C (98 °F)-36.9 °C (98.4 °F)] 36.9 °C (98.4 °F)  Heart Rate:  [75-98] 79  Resp:  [16-20] 20  BP: (116-170)/(63-80) 116/66      Intake/Output Summary (Last 24 hours) at 04/10/19 0724  Last data filed at 04/10/19 0600   Gross per 24 hour   Intake           754.87 ml   Output                0 ml   Net           754.87 ml     Intake/Output this shift:  No intake/output data recorded.    Labs  Lab Results   Component Value Date    WBC 12.52 (H) 04/10/2019    HGB 9.6 (L) 04/10/2019    HCT 29.8 (L) 04/10/2019     04/10/2019    ALT 31 04/10/2019    AST 24 04/10/2019     04/10/2019    K 4.4 04/10/2019     04/10/2019    CREATININE 1.1 04/10/2019    BUN 12 04/10/2019    CO2 21 (L) 04/10/2019    MG 2.3 04/10/2019    PHOS 4.1 04/08/2019    INR 1.2 04/10/2019    HGBA1C 5.6 03/29/2019    PT 14.4 04/10/2019    PTT 34 04/10/2019       Full Code    Head/Ear/Nose/Throat:     NCAT.                               Eyes:     PERRL.                     Respiratory:     diminished at the bases b/l.               Cardiovascular:     SR.              Gastrointestinal:     + bowel sounds.                 Genitourinary:     No deformities.                    Extremities:     trace edema b/l lower extremities.              Musculoskeletal:      No injury or deformity.                   Neurological:     Follows commands.       Behavior/Emotional:      Cooperative.                           Lymph:      No adenopathy noted.                               Skin:      Clean, dry and intact.     Examination of the patient performed at the bedside. Rounded with ICU team and discussed management/plan with surgical staff. Medications, radiological studies and labs reviewed and discussed with attending of record, Dr. Georgi Pepe.    Cardiothoracic Assessment and Plan:    Neurologic: A&Ox3 and pain controlled. Had questionable seizure activity/syncopal episode 4/7 which resolved spontaneously; no post-ictal period or change in hemodynamics. History of metastatic lung cancer to brain which has since been treated. Unclear if brain metastasis could have had any contribution to neuro process. EEG inconclusive. Neurology recommending CT scan of the head but Pt is refusing at this time. Melatonin for sleep.     Cardiovascular: Severe MR s/p MV repair s/p cardiogenic shock with right heart failure, also with chronic diastolic CHF with a preserved EF and hypertension. RVAD placed intra-op due to worsening RV function and was removed 4/8. Most recent TTE showed improvement of RV function. Responded to cardioversion and no longer in a-fib. Will continue to optimize cardiac medications. Appreciate Cardiology input. Continue statin for dyslipidemia.     Respiratory: I personally reviewed the most recent CXR which portrayed b/l pulmonary congestion, persistence of right pleural effusion and small lung volumes with interval clearing compared to previous exam. Will consult IR for possible drainage if becomes any worse or begins to effect pulmonary mechanics. Will encourage IS, mobilization and wean O2 PRN.      Genitourinary: No gambino. Creatinine trending down. Making adequate urine output. Will avoid nephrotoxic medications. Goal for net negative fluid balance.     Endocrine: Follow [facility]SBG.     Hematologic: No evidence active bleeding. Bridging to coumadin using lovenox. Acute  (expected) blood loss anemia from surgery. Heme/Onc evaluating most recent CT scan to make sure metastatic lesions are not worse compared to previous imaging.     Infectious Disease: No indication for antibiotics at this time.     Fluids, Electrolytes: Electrolytes replaced per protocol. Goal for net negative fluid balance.     Gastrointestinal: PO as tolerated. GI ppx. LFTs have trended down.     Skin: OOB, PT. A-line removed from right hand and no evidence DVT based on imaging; swelling has decreased.     Tubes, lines and drains: Will remove superfluous invasive monitors PRN.    Disposition: OK for stepdown unit.            Antonio Lafleur,   4/10/2019

## 2019-04-10 NOTE — PLAN OF CARE
Problem: Patient Care Overview  Goal: Plan of Care Review  Outcome: Ongoing (interventions implemented as appropriate)   04/10/19 1202   Coping/Psychosocial   Plan Of Care Reviewed With patient   Plan of Care Review   Progress progress toward functional goals as expected   Outcome Summary Pt progressing towards goals set by PT, continue per PT POC.        Problem: Cardiac Surgery (Adult)  Goal: Signs and Symptoms of Listed Potential Problems Will be Absent, Minimized or Managed (Cardiac Surgery)  Outcome: Ongoing (interventions implemented as appropriate)

## 2019-04-10 NOTE — PROGRESS NOTES
Rx for Lovenox faxed to [facility]- [facility]. They have in stock. Patient will be self taught Lovenox. Maki at [facility] advised of d/c tomorrow and need for PT/INR with 1st one probably Friday 4/12/19. Will follow to assist with transitions of care.

## 2019-04-10 NOTE — PROGRESS NOTES
Per Zakiya DARNELL, may need Sildenafil. This requires prior authorization per [facility] at pharmacy.. TC to [facility] to request authorization form- [phone]. They are faxing me forms. To have Echo to determine need.   Addendum- received forms. Per Zakiya DARNELL, does not want to start process of authorization until it is determined that he needs it.

## 2019-04-10 NOTE — PROGRESS NOTES
[facility] called from pharmacy- cost of Lovenox is $53.88 and they have in stock. Patient and wife advised.

## 2019-04-10 NOTE — PATIENT CARE CONFERENCE
Care Progression Rounds Note  Date: 4/10/2019  Time: 10:31 AM     Patient Name: Wesley Fields     Medical Record Number: [id number]   YOB: 1960  Sex: Male      Room/Bed: 2003    Admitting Diagnosis: Mitral valve insufficiency, unspecified etiology [I34.0]  Mitral regurgitation [I34.0]   Admit Date/Time: 3/29/2019  5:40 AM    Primary Diagnosis: Mitral valve insufficiency  Principal Problem: Mitral valve insufficiency    GMLOS: 9.1  Anticipated Discharge Date: 4/11/2019    AM-PAC  Mobility Score: 20    Discharge Planning:  Living Arrangements: house  Concerns To Be Addressed: care coordination/care conferences  Anticipated Discharge Disposition: home with home health services  Type of Home Care Services: nursing  Type of Outpatient Services: other (comments) (denied OP services)    Barriers to Discharge:  Barriers to Discharge: Medical issues not resolved  Comment: Transferred from [facility] today.     Participants:  advanced practice provider, physician, social work/services, , nursing, physical therapy

## 2019-04-10 NOTE — PROGRESS NOTES
Patient: Wesley Fields  Location: [facility] 2003  MRN: [id number]  Today's date: 4/10/2019    Pt was left sitting EOB with call bell in reach, all needs met, RN aware.     Patient performed warm up (x10) exercises for gait training including: ankle pumps, LAQ, seated marches, elbow flexion, and shoulder flexion.     Pt ambulated 36' without at DS level. Pt without any LOB during gait training. Pt performed TUG in 7.3 sec which puts him at a low fall risk. Pt negotiated 12 stairs with B/L HR support at S level with reciprocal pattern. VC's for energy conservation techniques. Pt reports a 10/20 on a scale for rate of perceived exertion which puts the pt at a fairly light exertion rate.     Hospital Course  Wesley STALLINGS is a 58 y.o. male admitted on 3/29/2019 with Mitral valve insufficiency, unspecified etiology [I34.0]  Mitral regurgitation [I34.0]. Principal problem is Mitral valve insufficiency.    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     Admit w/mitral insufficiency s/p MVR w/ dora cords, +thoractomy> hosp course complicated by hypoxemia, +PFO w/cardiogenic shock/R ventricular dysfunction> now on VV ECMO thru subclavian    4/3/19 Cardiology assessed pt: plan to remain on ECMO through this week.     4/5 improving R heart function per SAURABH on 4/4 4/8 Pt off ECMO          Therapy Pain/Vitals     Row Name 04/10/19 1148 04/10/19 1155       Pain/Comfort/Sleep    Pain Charting Type Pain Assessment  --    Presence of Pain denies  --    Preferred Pain Scale number (Numeric Rating Pain Scale)  --    Pain Rating (0-10): Rest 0  --    Pain Rating (0-10): Activity  0  --       Vital Signs    Pulse 82 86    Heart Rate Source Monitor Monitor    SpO2 96 %  --    Patient Activity At rest  --    Oxygen Therapy None (Room air)  --    /67  --    MAP (mmHg) 107 mmHg  --    BP Location Right upper arm  --    BP Method Automatic  --    Patient Position Sitting  --          Prior Living Environment      Most Recent Value   Lives With  spouse [wife's name: Mulugeta]   Living Arrangements  house   Living Environment Comment  2SH w/4 steps to enter w/o railing, powder room on first floor, both tub and shower stall shower on 2nd floor, bedroom on 2nd floor    Equipment Currently Used at Home  none          Prior Level of Function      Most Recent Value   Ambulation  independent   Transferring  independent   Toileting  independent   Bathing  independent   Dressing  independent   Eating  independent   Equipment Currently Used at Home  none   Prior Functional Level Comment  INd w/o AD for community distances and stairs.  Wife does most of IADl's.  Pt was working.                 PT Treatment Summary - 04/10/19 1145        Session Details    Document Type daily treatment    Mode of Treatment individual therapy;physical therapy       Time Calculation    Start Time 1145    Stop Time 1158    Time Calculation (min) 13 min       General Information    Patient Profile Reviewed? yes    Existing Precautions/Restrictions cardiac;fall       Sit to Stand Transfer    [place], Sit to Stand Transfer distant supervision       Stand to Sit Transfer    [place], Stand to Sit Transfer distant supervision       Gait Training    [place], Gait distant supervision    Distance in Feet 36 feet    Gait Pattern Utilized step-through    Comment see note       Stairs Training    [place], Stairs supervision    Handrail Location both sides    Number of Stairs 12    Ascending Stairs Technique step-over-step    Descending Stairs Technique step-over-step       LE Seated Therapeutic Exercise    Comment see  note       AM-PAC (TM) - Mobility (Current Function)    Turning from your back to your side while in a flat bed without using bedrails? 4 - None    Moving from lying on your back to sitting on the side of a flat bed without using bedrails? 4 - None    Moving to and from a bed to a chair? 3 - A Little    Standing up from a chair using your arms? 3 - A Little    To walk in a hospital room? 3 - A Little    Climbing 3-5 steps with a railing? 3 - A Little    AM-PAC (TM) Mobility Score 20       PT Clinical Impression    Plan For Care Reviewed: Physical Therapy patient voices agreement with PT plan for care    Rehab Potential/Prognosis good, to achieve stated therapy goals    PT Frequency of Treatment 5-7 times per week    Anticipated Equipment Needs at Discharge none    Daily Outcome Statement Pt ambulating at distant supervision without AD. Rec home with wife when medically stable.           Pain Assessment/Intervention  Pain Charting Type: Pain Assessment             Education provided this session. See the Patient Education summary report for full details.  Pt educated on energy conservation techniques when negotiating the stairs. Pt was receptive to education and verbalized understanding. HEP given.    PT Care Plan Goals      Most Recent Value   Stair Goal, PT   PT STG: Stairs  supervision required   PT STG: Number of Stairs  4      PT Care Plan Goals      Most Recent Value   Bed Mobility Goal   Date Goal Established: Bed Mobility  04/01/19   Time to Achieve Goal: Bed Mobility  5 days   Goal Activity: Bed Mobility  all bed mobility activities   Level of [place] Goal: Bed Mobility  independent   Gait Goal   Date Goal Established: Gait Training  04/01/19   Time to Achieve Goal: Gait Training  5 days   Level of [place]  supervision required   Assistive Device: Gait Training  walker, rolling   Distance Goal: Gait Training (feet)  250 feet   Goal: Gait Training  amb w/least AD    Goal Outcome: Gait Training  goal  met   Transfer Goal   Date Goal Established: Transfer Training  04/01/19   Time to Achieve Goal: Transfer Training  5 days   Goal Activity: Transfer Training  bed to chair/chair to bed, sit to stand/stand to sit   Level of [place] Goal: Transfer Training  independent

## 2019-04-11 ENCOUNTER — ANTICOAGULATION VISIT (OUTPATIENT)
Dept: SCHEDULING | Facility: CLINIC | Age: 59
End: 2019-04-11

## 2019-04-11 ENCOUNTER — APPOINTMENT (INPATIENT)
Dept: CARDIOLOGY | Facility: HOSPITAL | Age: 59
DRG: 003 | End: 2019-04-11
Attending: NURSE PRACTITIONER
Payer: COMMERCIAL

## 2019-04-11 VITALS
OXYGEN SATURATION: 100 % | SYSTOLIC BLOOD PRESSURE: 138 MMHG | HEART RATE: 80 BPM | TEMPERATURE: 97.2 F | BODY MASS INDEX: 33.46 KG/M2 | DIASTOLIC BLOOD PRESSURE: 85 MMHG | RESPIRATION RATE: 20 BRPM | WEIGHT: 247 LBS | HEIGHT: 72 IN

## 2019-04-11 DIAGNOSIS — I34.0 NON-RHEUMATIC MITRAL REGURGITATION: ICD-10-CM

## 2019-04-11 DIAGNOSIS — Z98.890 H/O MITRAL VALVE REPAIR: ICD-10-CM

## 2019-04-11 LAB
ANION GAP SERPL CALC-SCNC: 11 MEQ/L (ref 3–15)
BSA FOR ECHO PROCEDURE: 2.39 M2
BUN SERPL-MCNC: 13 MG/DL (ref 8–20)
CALCIUM SERPL-MCNC: 8.5 MG/DL (ref 8.9–10.3)
CHLORIDE SERPL-SCNC: 107 MEQ/L (ref 98–109)
CO2 SERPL-SCNC: 20 MEQ/L (ref 22–32)
CREAT SERPL-MCNC: 1.2 MG/DL
EDV (BP): 97.5 ML
EF (A4C): 59.6 %
EF A2C: 50.4 %
EJECTION FRACTION: 56.4 %
ERYTHROCYTE [DISTWIDTH] IN BLOOD BY AUTOMATED COUNT: 15.7 % (ref 11.6–14.4)
ESTIMATED DIASTOLIC PULMONARY ARTERY PRESSURE: 3.65 MMHG
ESV (BP): 42.5 ML
GFR SERPL CREATININE-BSD FRML MDRD: >60 ML/MIN/1.73M*2
GLUCOSE SERPL-MCNC: 139 MG/DL (ref 70–99)
HCT VFR BLDCO AUTO: 31.3 %
HGB BLD-MCNC: 10.1 G/DL
INR PPP: 1.3 INR
LEFT VENTRICLE DIASTOLIC VOLUME INDEX: 37.53 ML/M2
LEFT VENTRICLE DIASTOLIC VOLUME: 89.7 ML
LEFT VENTRICLE SYSTOLIC VOLUME INDEX: 15.15 ML/M2
LEFT VENTRICLE SYSTOLIC VOLUME: 36.2 ML
LV DIASTOLIC VOLUME: 97.1 ML
LV ESV (APICAL 2 CHAMBER): 48.1 ML
LVEDVI(A2C): 40.63 ML/M2
LVEDVI(BP): 40.79 ML/M2
LVESVI(A2C): 20.13 ML/M2
LVESVI(BP): 17.78 ML/M2
MAGNESIUM SERPL-MCNC: 2.4 MG/DL (ref 1.8–2.5)
MCH RBC QN AUTO: 29.2 PG (ref 28–33.2)
MCHC RBC AUTO-ENTMCNC: 32.3 G/DL (ref 32.2–36.5)
MCV RBC AUTO: 90.5 FL (ref 83–98)
PDW BLD AUTO: 9.6 FL (ref 9.4–12.4)
PLATELET # BLD AUTO: 274 K/UL
POTASSIUM SERPL-SCNC: 4.2 MEQ/L (ref 3.6–5.1)
PROTHROMBIN TIME: 15.6 SEC (ref 12.2–14.5)
PULMONARY REGURGITATION LATE DIASTOLIC VELOCITY: 0.96 M/S
RBC # BLD AUTO: 3.46 M/UL (ref 4.5–5.8)
RVOT VMAX: 0.95 M/S
RVOT VTI: 16.7 CM
SAO2 % BLDV: 54.5 % (ref 30–60)
SODIUM SERPL-SCNC: 138 MEQ/L (ref 136–144)
TR MAX PG: 23.4 MMHG
TRICUSPID VALVE PEAK REGURGITATION VELOCITY: 2.42 M/S
WBC # BLD AUTO: 11.18 K/UL

## 2019-04-11 PROCEDURE — 63700000 HC SELF-ADMINISTRABLE DRUG: Performed by: NURSE PRACTITIONER

## 2019-04-11 PROCEDURE — 63700000 HC SELF-ADMINISTRABLE DRUG: Performed by: PHYSICIAN ASSISTANT

## 2019-04-11 PROCEDURE — 36415 COLL VENOUS BLD VENIPUNCTURE: CPT | Performed by: NURSE PRACTITIONER

## 2019-04-11 PROCEDURE — 93308 TTE F-UP OR LMTD: CPT

## 2019-04-11 PROCEDURE — 200200 PR NO CHARGE: Performed by: THORACIC SURGERY (CARDIOTHORACIC VASCULAR SURGERY)

## 2019-04-11 PROCEDURE — 82810 BLOOD GASES O2 SAT ONLY: CPT | Performed by: NURSE PRACTITIONER

## 2019-04-11 PROCEDURE — 63600000 HC DRUGS/DETAIL CODE: Performed by: PHYSICIAN ASSISTANT

## 2019-04-11 PROCEDURE — 85027 COMPLETE CBC AUTOMATED: CPT | Performed by: CLINICAL NURSE SPECIALIST

## 2019-04-11 PROCEDURE — 93308 TTE F-UP OR LMTD: CPT | Mod: 26 | Performed by: INTERNAL MEDICINE

## 2019-04-11 PROCEDURE — 85610 PROTHROMBIN TIME: CPT | Performed by: NURSE PRACTITIONER

## 2019-04-11 PROCEDURE — 99233 SBSQ HOSP IP/OBS HIGH 50: CPT | Performed by: INTERNAL MEDICINE

## 2019-04-11 PROCEDURE — 63700000 HC SELF-ADMINISTRABLE DRUG: Performed by: CLINICAL NURSE SPECIALIST

## 2019-04-11 PROCEDURE — 93321 DOPPLER ECHO F-UP/LMTD STD: CPT | Mod: 26 | Performed by: INTERNAL MEDICINE

## 2019-04-11 PROCEDURE — 84132 ASSAY OF SERUM POTASSIUM: CPT | Performed by: THORACIC SURGERY (CARDIOTHORACIC VASCULAR SURGERY)

## 2019-04-11 PROCEDURE — 93325 DOPPLER ECHO COLOR FLOW MAPG: CPT | Mod: 26 | Performed by: INTERNAL MEDICINE

## 2019-04-11 PROCEDURE — 25000000 HC PHARMACY GENERAL: Performed by: NURSE PRACTITIONER

## 2019-04-11 PROCEDURE — 83735 ASSAY OF MAGNESIUM: CPT | Performed by: PHYSICIAN ASSISTANT

## 2019-04-11 RX ORDER — LISINOPRIL 5 MG/1
5 TABLET ORAL DAILY
Status: DISCONTINUED | OUTPATIENT
Start: 2019-04-11 | End: 2019-04-11 | Stop reason: HOSPADM

## 2019-04-11 RX ORDER — POTASSIUM CHLORIDE 1500 MG/1
20 TABLET, EXTENDED RELEASE ORAL 2 TIMES DAILY
Qty: 60 TABLET | Refills: 3 | Status: SHIPPED | OUTPATIENT
Start: 2019-04-11 | End: 2019-05-21

## 2019-04-11 RX ORDER — WARFARIN SODIUM 5 MG/1
5 TABLET ORAL DAILY
Qty: 30 TABLET | Refills: 3 | Status: SHIPPED | OUTPATIENT
Start: 2019-04-11 | End: 2019-04-26 | Stop reason: SDUPTHER

## 2019-04-11 RX ORDER — AMIODARONE HYDROCHLORIDE 400 MG/1
400 TABLET ORAL DAILY
Qty: 30 TABLET | Refills: 3 | Status: SHIPPED | OUTPATIENT
Start: 2019-04-11 | End: 2019-05-21

## 2019-04-11 RX ORDER — WARFARIN SODIUM 5 MG/1
5 TABLET ORAL ONCE
Status: DISCONTINUED | OUTPATIENT
Start: 2019-04-11 | End: 2019-04-11 | Stop reason: HOSPADM

## 2019-04-11 RX ORDER — LISINOPRIL 5 MG/1
5 TABLET ORAL DAILY
Qty: 30 TABLET | Refills: 3 | Status: SHIPPED | OUTPATIENT
Start: 2019-04-12 | End: 2019-05-21

## 2019-04-11 RX ORDER — NAPROXEN 250 MG/1
500 TABLET ORAL 2 TIMES DAILY WITH MEALS
Qty: 120 TABLET | Refills: 0 | Status: SHIPPED | OUTPATIENT
Start: 2019-04-11 | End: 2019-05-21

## 2019-04-11 RX ORDER — WARFARIN SODIUM 5 MG/1
5 TABLET ORAL DAILY
Qty: 30 TABLET | Refills: 3 | Status: CANCELLED | OUTPATIENT
Start: 2019-04-11 | End: 2019-05-11

## 2019-04-11 RX ORDER — COLCHICINE 0.6 MG/1
0.6 TABLET ORAL DAILY
Qty: 30 TABLET | Refills: 0 | Status: SHIPPED | OUTPATIENT
Start: 2019-04-12 | End: 2019-05-21

## 2019-04-11 RX ORDER — ASPIRIN 81 MG/1
81 TABLET ORAL DAILY
Qty: 30 TABLET | Refills: 3 | Status: SHIPPED | OUTPATIENT
Start: 2019-04-11 | End: 2019-05-21

## 2019-04-11 RX ORDER — LISINOPRIL 5 MG/1
5 TABLET ORAL DAILY
Qty: 30 TABLET | Refills: 3 | Status: CANCELLED | OUTPATIENT
Start: 2019-04-11 | End: 2019-05-11

## 2019-04-11 RX ORDER — OXYCODONE HYDROCHLORIDE 5 MG/1
5 TABLET ORAL EVERY 8 HOURS PRN
Qty: 21 TABLET | Refills: 0 | Status: SHIPPED | OUTPATIENT
Start: 2019-04-11 | End: 2019-05-21

## 2019-04-11 RX ORDER — FUROSEMIDE 40 MG/1
40 TABLET ORAL
Status: DISCONTINUED | OUTPATIENT
Start: 2019-04-11 | End: 2019-04-11 | Stop reason: HOSPADM

## 2019-04-11 RX ADMIN — Medication 10 ML: at 06:03

## 2019-04-11 RX ADMIN — FOLIC ACID 1 MG: 1 TABLET ORAL at 10:29

## 2019-04-11 RX ADMIN — COLCHICINE 0.6 MG: 0.6 TABLET, FILM COATED ORAL at 08:38

## 2019-04-11 RX ADMIN — ASPIRIN 81 MG: 81 TABLET, COATED ORAL at 10:29

## 2019-04-11 RX ADMIN — LISINOPRIL 5 MG: 5 TABLET ORAL at 11:43

## 2019-04-11 RX ADMIN — FAMOTIDINE 20 MG: 20 TABLET, FILM COATED ORAL at 08:38

## 2019-04-11 RX ADMIN — SALINE NASAL SPRAY 2 SPRAY: 1.5 SOLUTION NASAL at 08:43

## 2019-04-11 RX ADMIN — Medication 100 MG: at 10:30

## 2019-04-11 RX ADMIN — MAGNESIUM GLUCONATE 500 MG ORAL TABLET 400 MG: 500 TABLET ORAL at 10:31

## 2019-04-11 RX ADMIN — MULTIPLE VITAMINS W/ MINERALS TAB 1 TABLET: TAB at 08:38

## 2019-04-11 RX ADMIN — NAPROXEN 250 MG: 250 TABLET ORAL at 10:30

## 2019-04-11 RX ADMIN — POTASSIUM CHLORIDE 20 MEQ: 750 TABLET, FILM COATED, EXTENDED RELEASE ORAL at 11:43

## 2019-04-11 RX ADMIN — ACETAMINOPHEN 975 MG: 325 TABLET ORAL at 06:01

## 2019-04-11 RX ADMIN — AMIODARONE HYDROCHLORIDE 400 MG: 200 TABLET ORAL at 08:38

## 2019-04-11 RX ADMIN — ACETAMINOPHEN 975 MG: 325 TABLET ORAL at 11:43

## 2019-04-11 RX ADMIN — CHLORHEXIDINE GLUCONATE 15 ML: 1.2 RINSE ORAL at 08:38

## 2019-04-11 RX ADMIN — LEVETIRACETAM 500 MG: 500 TABLET, FILM COATED ORAL at 08:38

## 2019-04-11 RX ADMIN — FUROSEMIDE 40 MG: 40 TABLET ORAL at 08:38

## 2019-04-11 RX ADMIN — ENOXAPARIN SODIUM 40 MG: 100 INJECTION SUBCUTANEOUS at 07:34

## 2019-04-11 RX ADMIN — IPRATROPIUM BROMIDE AND ALBUTEROL SULFATE 3 ML: 2.5; .5 SOLUTION RESPIRATORY (INHALATION) at 07:40

## 2019-04-11 RX ADMIN — SALINE NASAL SPRAY 2 SPRAY: 1.5 SOLUTION NASAL at 13:52

## 2019-04-11 NOTE — PROGRESS NOTES
Cardiology Progress Note    SUBJECTIVE  He has successfully weaned off of milrinone. I stopped Sildenafil after yesterday 8am dose and he continues to feel well and ambulating on floors and practicing stairs.     Inpatient medications:  •  acetaminophen, 975 mg, oral, q6h RIGOBERTO  •  alum-mag hydroxide-simeth, 30 mL, oral, q4h PRN  •  amiodarone, 400 mg, oral, BID  •  aspirin, 81 mg, oral, Daily  •  benzocaine-menthol, 1 lozenge, Mouth/Throat, q2h PRN  •  benzonatate, 100 mg, oral, 3x daily PRN  •  camphor-methyl salicyl-menthol, , Topical, PRN  •  cetirizine, 10 mg, oral, Nightly  •  chlorhexidine, 15 mL, Mouth/Throat, 2 times per day  •  colchicine, 0.6 mg, oral, Daily  •  [DISCONTINUED] insulin, 0-15 Units/hr, intravenous, Titrated **AND** dextrose in water, 10-30 mL, intravenous, PRN  •  diphenhydrAMINE, 25 mg, oral, q6h PRN **OR** [DISCONTINUED] diphenhydrAMINE, 25 mg, intravenous, q6h PRN  •  docusate sodium, 100 mg, oral, BID  •  enoxaparin, 40 mg, subcutaneous, q12h (6a, 6p)  •  [DISCONTINUED] famotidine, 20 mg, intravenous, BID **OR** famotidine, 20 mg, oral, BID  •  folic acid, 1 mg, oral, Daily  •  furosemide, 40 mg, oral, BID (9a, 4p)  •  ipratropium-albuterol, 3 mL, nebulization, q6h RIGOBERTO  •  levETIRAcetam, 500 mg, oral, BID  •  lorlatinib, 75 mg, oral, Daily  •  magnesium oxide, 400 mg, oral, BID  •  melatonin, 6 mg, oral, Nightly PRN  •  multivitamin, 1 tablet, oral, Daily  •  mupirocin, 1 application, Topical, TID  •  naproxen, 250 mg, oral, BID with meals  •  ondansetron ODT, 4 mg, oral, q8h PRN **OR** [DISCONTINUED] ondansetron, 4 mg, intravenous, q8h PRN  •  oxyCODONE, 10 mg, oral, q6h PRN  •  oxyCODONE, 5 mg, oral, q4h PRN  •  potassium chloride, 20 mEq, oral, BID  •  rosuvastatin, 10 mg, oral, Daily (6p)  •  senna, 1 tablet, oral, 2x daily PRN  •  sodium chloride, 2 spray, Each Nostril, QID  •  sodium chloride, 10 mL, intravenous, q8h INT  •  sodium chloride, 10 mL, intravenous, PRN  •  thiamine, 100  mg, oral, Daily  •  enoxaparin    •  chlorhexidine, Swish and spit 15 mL 2 (two) times a day.  •  furosemide, Take 40 mg by mouth 2 (two) times a day.  •  lorlatinib, Take 75 mg by mouth daily.  •  metoprolol succinate XL, Take 25 mg by mouth daily.  •  [] mupirocin, Apply 1 application topically 2 (two) times a day for 5 days. Nasal application, starting 5 days before surgery  •  potassium chloride (KLOR-CON ORAL), Take 10 mEq by mouth. Every other day    •  rosuvastatin, Take 10 mg by mouth daily.  •  lisinopril, Take 20 mg by mouth 2 (two) times a day.    Review of Systems: Review of systems otherwise normal.    OBJECTIVE:   /83 (BP Location: Right upper arm, Patient Position: Sitting)   Pulse 80   Temp 36.5 °C (97.7 °F) (Oral)   Resp 20   Ht 1.829 m (6')   Wt 112 kg (247 lb 12.8 oz)   SpO2 97%   BMI 33.61 kg/m²       Intake/Output Summary (Last 24 hours) at 19 0957  Last data filed at 04/10/19 2100   Gross per 24 hour   Intake              120 ml   Output                0 ml   Net              120 ml       Physical Exam   Constitutional: Well-developed and well-nourished. No distress.   HENT: Normocephalic and atraumatic. Moist mucous membranes.  Eyes: Sclera anicteric.  Cardiovascular: Normal rate and regular rhythm.   Pulmonary/Chest: Normal effort and air entry.   Abdominal: Soft, non tender, no distension.   Musculoskeletal: 1+ b/l r>l LE edema  Neurological: Alert and oriented to person, place, and time.   Skin: Skin is warm and dry.   Psychiatric: Normal mood, affect and behavior.       Labs    Results from last 7 days  Lab Units 04/10/19  0549 19  0538 19  0521   SODIUM mEQ/L 142 139 139   POTASSIUM mEQ/L 4.4 4.0 4.3   CHLORIDE mEQ/L 109 109 109   CO2 mEQ/L 21* 20* 21*   BUN mg/dL 12 12 12   CREATININE mg/dL 1.1 1.1 1.1   CALCIUM mg/dL 8.5* 7.9* 7.9*   ALBUMIN g/dL 2.9* 2.7* 2.7*   BILIRUBIN TOTAL mg/dL 0.6 0.5 0.7   ALK PHOS IU/L 66 64 66   ALT IU/L 31 31 37   AST  IU/L 24 23 30   GLUCOSE mg/dL 109* 123* 125*       Results from last 7 days  Lab Units 04/11/19  0922 04/10/19  0549 04/09/19  0538   WBC K/uL 11.18* 12.52* 13.19*   HEMOGLOBIN g/dL 10.1* 9.6* 9.2*   HEMATOCRIT % 31.3* 29.8* 28.4*   PLATELETS K/uL 274 209 167             Cardiology results    TTE:   Cardiac Imaging   TRANSTHORACIC ECHO (TTE) LIMITED 04/04/2019    Narrative Technically limited study.  Limited study for right ventricular function.    1. Mild concentric left ventricular hypertrophy with normal cavity size   and preserved systolic function.  Abnormal septal motion.  Unable to fully   assess regional wall motion abnormalities given limited study.  2. Moderately dilated right ventricular size with severely reduced   systolic function (TAPSE 1.11 cm).  Although right ventricular function   appears severely reduced, it appears somewhat improved from previous study   on 4/2/2019. Protek-Duo Catheter is visualized in the right-sided cardiac   structures.  3. Grossly normal aortic valve.   4. Thickened mitral leaflets. Status post mitral valve repair with   annuloplasty ring. Mild mitral regurgitation.  5. Mildly dilated left atrium.  6. Mild tricuspid regurgitation with insufficient jet to determine RVSP.  7. Mild pulmonic regurgitation.  8. Small pericardial effusion.  9. Compared to previous study dated 4/2/2019, right ventricular function   appears slightly improved.          TRANSTHORACIC ECHO (TTE) LIMITED 04/02/2019    Narrative Limited study for RVAD wean  1. Mild concentric left ventricular hypertrophy with normal cavity size   and preserved systolic function. Cannot adequately assess regional wall   motion abnormalities. Abnormal septal motion.  2. Moderately dilated right ventricular size with severely decreased   systolic function (TAPSE 0.75-0.8cm).  The right ventricular outflow tract   however displays some contraction.  Blancas sign is also present.    Protek-Duo Catheter is visualized in the  right-sided cardiac structures.   During wean of RVAD there is a slight increase in right ventricular size   without appreciable change in RV function by TAPSE.   3. Grossly normal aortic valve.   4. Thickened mitral leaflets. Status post mitral valve repair with   annuloplasty ring. No obvious mitral regurgitation on the limited views   obtained.  5. Mildly dilated left atrium.  6. Mild tricuspid regurgitation.  7. Trace posterior pericardial effusion.     Echo 4/8/19:  Technically limited study.  Limited study for right ventricular function.  1. Normal cavity size and preserved systolic function.  Abnormal septal motion.  Unable to fully assess regional wall motion abnormalities or wall thickness given limited study.  2. Mildly dilated right ventricular size with mildly reduced systolic function.  Protek-Duo Catheter is visualized in the right-sided cardiac structures.  3. Grossly normal aortic valve.   4. Thickened mitral leaflets. Status post mitral valve repair with annuloplasty ring and neocords with residual mild mitral regurgiation.   5. Mildly dilated left atrium.  6. Insufficient tricuspid regurgitation to determine RVSP.  7. Pulmonic valve not well visualized  8. Small pericardial effusion.  9.   Compared to previous study dated 4/4/2019, right ventricular function has improved.  10. Discussed with Amelia.    Imaging: Imaging reviewed.    ASSESSMENT AND PLAN  58 y.o. male admitted for Mitral valve insufficiency, unspecified etiology [I34.0]  Mitral regurgitation [I34.0], Cardiology consulted for:    Right ventricular dysfunction   Assessment & Plan    Management per CT surgery with continued mechanical support until 4/8. Now on support with Sildenafil, but trial off of it starting yesterday. Continue to follow central venous sats as a marker of CO. I would increase furosemide to 40mg bid, which was his home dose and he does seem to require a bit more then the current 40mg QD.  Echo to be done this AM. I  will personally review it when done this morning to decide if he should remain off of sildenafil vs resuming it prior to discharge. I made f/u appt for 5/21 12:30PM to coincide with visit with Dr. Pepe since he lives >2 hours away.     Pulmonary hypertension (CMS/HCC) ([facility]ly [facility])   Assessment & Plan    Patient known to have mild pulmonary hypertension based on RHC prior to admission (mean PA 28 mmHg and PWCP of 14 mmHg). His hospital course has been complicated by RV failure requiring mechanical support. Explanted 4/8 and tolerating well thus far. Continued Sildenafil 20 mg TID to help reduce RV afterload, but will now trial off of it. Doing well off Milrinone.   Because of what seems to be rapidly improving right ventricular function, I would like to try him off sildenafil.  I stopped it yesterday morning.  Plan to continue to monitor central venous saturation for cardiac output as well as monitor symptoms and hemodynamics. We will recheck an echocardiogram to look at his right ventricular function.  If not satisfactory, we will place him back on sildenafil for home use.  I discussed with him and his wife that even if he needs it for discharge, he may ultimately wean off of it.     * Mitral valve insufficiency   Assessment & Plan    He is status post mitral valve repair. Management per CT surgery.        Hypertension        High normal to mildly elevated. Ok to add back low dose Toprol. Would add back home lisinopril but at reduced dose of 5mg QD.    D/W primary team.      Jed Klein MD  4/11/2019

## 2019-04-11 NOTE — PROGRESS NOTES
Cardiology Progress Note    SUBJECTIVE  He has successfully weaned off of milrinone.  He then ambulated twice around the floor and felt well.  Denies chest pain.  Has some anxiety with being in the hospital but looking forward to potentially going home in the next couple days.    Inpatient medications:  •  acetaminophen, 975 mg, oral, q6h RIGOBERTO  •  alum-mag hydroxide-simeth, 30 mL, oral, q4h PRN  •  amiodarone, 400 mg, oral, BID  •  aspirin, 81 mg, oral, Daily  •  benzocaine-menthol, 1 lozenge, Mouth/Throat, q2h PRN  •  benzonatate, 100 mg, oral, 3x daily PRN  •  camphor-methyl salicyl-menthol, , Topical, PRN  •  cetirizine, 10 mg, oral, Nightly  •  chlorhexidine, 15 mL, Mouth/Throat, 2 times per day  •  colchicine, 0.6 mg, oral, Daily  •  [DISCONTINUED] insulin, 0-15 Units/hr, intravenous, Titrated **AND** dextrose in water, 10-30 mL, intravenous, PRN  •  diphenhydrAMINE, 25 mg, oral, q6h PRN **OR** [DISCONTINUED] diphenhydrAMINE, 25 mg, intravenous, q6h PRN  •  docusate sodium, 100 mg, oral, BID  •  enoxaparin, 40 mg, subcutaneous, q12h (6a, 6p)  •  [DISCONTINUED] famotidine, 20 mg, intravenous, BID **OR** famotidine, 20 mg, oral, BID  •  folic acid, 1 mg, oral, Daily  •  furosemide, 40 mg, oral, Daily  •  ipratropium-albuterol, 3 mL, nebulization, q6h RIGOBERTO  •  levETIRAcetam, 500 mg, oral, BID  •  lorlatinib, 75 mg, oral, Daily  •  magnesium oxide, 400 mg, oral, BID  •  melatonin, 6 mg, oral, Nightly PRN  •  multivitamin, 1 tablet, oral, Daily  •  mupirocin, 1 application, Topical, TID  •  naproxen, 250 mg, oral, BID with meals  •  ondansetron ODT, 4 mg, oral, q8h PRN **OR** [DISCONTINUED] ondansetron, 4 mg, intravenous, q8h PRN  •  oxyCODONE, 10 mg, oral, q6h PRN  •  oxyCODONE, 5 mg, oral, q4h PRN  •  potassium chloride, 20 mEq, oral, BID  •  rosuvastatin, 10 mg, oral, Daily (6p)  •  senna, 1 tablet, oral, 2x daily PRN  •  sodium chloride, 2 spray, Each Nostril, QID  •  sodium chloride, 10 mL, intravenous, q8h  INT  •  sodium chloride, 10 mL, intravenous, PRN  •  thiamine, 100 mg, oral, Daily  •  enoxaparin    Review of Systems: Review of systems otherwise normal.    OBJECTIVE:   /66 (BP Location: Right upper arm, Patient Position: Lying)   Pulse 84   Temp 36.9 °C (98.5 °F) (Oral)   Resp 16   Ht 1.829 m (6')   Wt 114 kg (251 lb 8.7 oz)   SpO2 96%   BMI 34.12 kg/m²       Intake/Output Summary (Last 24 hours) at 04/10/19 2243  Last data filed at 04/10/19 0600   Gross per 24 hour   Intake              120 ml   Output                0 ml   Net              120 ml       Physical Exam   Constitutional: Well-developed and well-nourished. No distress.   HENT: Normocephalic and atraumatic. Moist mucous membranes.  Eyes: Sclera anicteric.  Cardiovascular: Normal rate and regular rhythm.   Pulmonary/Chest: Normal effort and air entry.   Abdominal: Soft, non tender, no distension.   Musculoskeletal: 1+ b/l r>l LE edema  Neurological: Alert and oriented to person, place, and time.   Skin: Skin is warm and dry.   Psychiatric: Normal mood, affect and behavior.       Labs    Results from last 7 days  Lab Units 04/10/19  0549 04/09/19  0538 04/08/19  0521   SODIUM mEQ/L 142 139 139   POTASSIUM mEQ/L 4.4 4.0 4.3   CHLORIDE mEQ/L 109 109 109   CO2 mEQ/L 21* 20* 21*   BUN mg/dL 12 12 12   CREATININE mg/dL 1.1 1.1 1.1   CALCIUM mg/dL 8.5* 7.9* 7.9*   ALBUMIN g/dL 2.9* 2.7* 2.7*   BILIRUBIN TOTAL mg/dL 0.6 0.5 0.7   ALK PHOS IU/L 66 64 66   ALT IU/L 31 31 37   AST IU/L 24 23 30   GLUCOSE mg/dL 109* 123* 125*       Results from last 7 days  Lab Units 04/10/19  0549 04/09/19  0538 04/08/19  1241   WBC K/uL 12.52* 13.19* 16.08*   HEMOGLOBIN g/dL 9.6* 9.2* 9.9*   HEMATOCRIT % 29.8* 28.4* 29.9*   PLATELETS K/uL 209 167 138*             Cardiology results    TTE:   Cardiac Imaging   TRANSTHORACIC ECHO (TTE) LIMITED 04/04/2019    Narrative Technically limited study.  Limited study for right ventricular function.    1. Mild concentric left  ventricular hypertrophy with normal cavity size   and preserved systolic function.  Abnormal septal motion.  Unable to fully   assess regional wall motion abnormalities given limited study.  2. Moderately dilated right ventricular size with severely reduced   systolic function (TAPSE 1.11 cm).  Although right ventricular function   appears severely reduced, it appears somewhat improved from previous study   on 4/2/2019. Protek-Duo Catheter is visualized in the right-sided cardiac   structures.  3. Grossly normal aortic valve.   4. Thickened mitral leaflets. Status post mitral valve repair with   annuloplasty ring. Mild mitral regurgitation.  5. Mildly dilated left atrium.  6. Mild tricuspid regurgitation with insufficient jet to determine RVSP.  7. Mild pulmonic regurgitation.  8. Small pericardial effusion.  9. Compared to previous study dated 4/2/2019, right ventricular function   appears slightly improved.          TRANSTHORACIC ECHO (TTE) LIMITED 04/02/2019    Narrative Limited study for RVAD wean  1. Mild concentric left ventricular hypertrophy with normal cavity size   and preserved systolic function. Cannot adequately assess regional wall   motion abnormalities. Abnormal septal motion.  2. Moderately dilated right ventricular size with severely decreased   systolic function (TAPSE 0.75-0.8cm).  The right ventricular outflow tract   however displays some contraction.  Blancas sign is also present.    Protek-Duo Catheter is visualized in the right-sided cardiac structures.   During wean of RVAD there is a slight increase in right ventricular size   without appreciable change in RV function by TAPSE.   3. Grossly normal aortic valve.   4. Thickened mitral leaflets. Status post mitral valve repair with   annuloplasty ring. No obvious mitral regurgitation on the limited views   obtained.  5. Mildly dilated left atrium.  6. Mild tricuspid regurgitation.  7. Trace posterior pericardial effusion.     Echo  4/8/19:  Technically limited study.  Limited study for right ventricular function.  1. Normal cavity size and preserved systolic function.  Abnormal septal motion.  Unable to fully assess regional wall motion abnormalities or wall thickness given limited study.  2. Mildly dilated right ventricular size with mildly reduced systolic function.  Protek-Duo Catheter is visualized in the right-sided cardiac structures.  3. Grossly normal aortic valve.   4. Thickened mitral leaflets. Status post mitral valve repair with annuloplasty ring and neocords with residual mild mitral regurgiation.   5. Mildly dilated left atrium.  6. Insufficient tricuspid regurgitation to determine RVSP.  7. Pulmonic valve not well visualized  8. Small pericardial effusion.  9.   Compared to previous study dated 4/4/2019, right ventricular function has improved.  10. Discussed with Amelia.    Imaging: Imaging reviewed.    ASSESSMENT AND PLAN  58 y.o. male admitted for Mitral valve insufficiency, unspecified etiology [I34.0]  Mitral regurgitation [I34.0], Cardiology consulted for:    Right ventricular dysfunction   Assessment & Plan    Management per CT surgery with continued mechanical support until 4/8. Now on support with Sildenafil, but trial off of it today. Continue to follow central venous sats as a marker of CO.      Pulmonary hypertension (CMS/HCC) (HCC)   Assessment & Plan    Patient known to have mild pulmonary hypertension based on RHC prior to admission (mean PA 28 mmHg and PWCP of 14 mmHg). His hospital course has been complicated by RV failure requiring mechanical support. Explanted 4/8 and tolerating well thus far. Continued Sildenafil 20 mg TID to help reduce RV afterload, but will now trial off of it. Doing well off Milrinone.   Because of what seems to be rapidly improving right ventricular function, I would like to try him off sildenafil.  I stopped it this morning.  Plan to continue to monitor central venous saturation for  cardiac output as well as monitor symptoms and hemodynamics.  If continues to do overall tonight and tomorrow morning we will recheck an echocardiogram to look at his right ventricular function.  If not satisfactory, we will place him back on sildenafil for home use.  I discussed with him and his wife that even if he needs it for discharge, he may ultimately wean off of it.     * Mitral valve insufficiency   Assessment & Plan    He is status post mitral valve repair. Management per CT surgery.        D/W primary team this morning and afternoon.      Jed Klein MD  4/10/2019

## 2019-04-11 NOTE — DISCHARGE INSTRUCTIONS
DISCHARGE INSTRUCTIONS FOLLOWING CARDIAC SURGERY         Like any major operation, heart surgery is expected to drain a great deal of your strength and energy.  It requires patience, determination, and, most of all, TIME to recover fully.  Initially, you may be surprised by the amount of exertion which will cause you to stop and rest.  How you care for yourself after discharge is extremely important to your return to your pre-operative life style.       GUIDELINE FOR ACTIVITY     During your first week at home we strongly suggest that you have assistance from family and friends.  You can expect to have good days and bad days, so regulate activities according to what your body tells you.  Try to balance rest and activity, and when resting remember to keep your legs elevated.   It is important to continue to weigh yourself everyday, just as you were weighed daily in the hospital.  Try to weigh yourself every morning after going to the bathroom, using the same scale.  A gradual weight gain of 5 pounds in 3 to 5 days, or any increase in puffiness or swelling in fingers and ankles, should be reported to your doctor immediately.   It is also important to shower daily, in order to keep your incisions clean. Let the water run down and do not rub. No soap on your incisions.You should check your incision daily, and report any increased redness, drainage, or a temperature over 101 to your surgeon immediately.  A simple task of taking a shower can be very challenging the first week or two after surgery and you may find that you need some assistance.  A chair in the shower can be helpful if standing for a long period is too tiring.  A simple plastic lawn chair can be placed in the shower, or a small shower bench can be purchased at a local drug store.  A handheld shower head could also be helpful to use while sitting in the shower.  You may also find that you just need an extra set of hands to help reach your back or wash your  hair.   Getting dressed is another seemingly simple task that you may require assistance with during the first week or two following surgery.  It may be necessary to simplify your clothing for a while.  Sweatpants may be easier at first, they are easy to pull up, and you may find that your pre-op clothes are still a bit too snug.  Zip-up sweatshirts or button-up shirts are also easier, as are slip on shoes.  For women, a support bra must be worn, and will also help take the pressure off the chest incision.  A front hooking bra should be worn, as you will not be able to reach behind to fasten any hooks.  Support stockings will also be worn during the day only.  This is something that you will definitely need assistance with, as support stockings are difficult to put on and you are not allowed to pull/lift anything with your arms over 5 lbs.    As the weeks go on, you should slowly start feeling like your “old” self.  By the third week you should be showering independently, and dressing on your own.  You should avoid sleeping during the day, and continue with your daily walking program.  By the third week you should also start participating in your normal daily activities.  For example, if your family is preparing a meal, you could sit in the kitchen and help to chop the vegetables or get together the salad for dinner.  You could sit down and help fold the laundry.  By the fourth week you may feel ready to take a short trip to the grocery store, with someone else pushing the cart and reaching for the groceries, but you picking out the ingredients for dinner.    By the fourth or fifth week you could slowly resume driving.  When you do start to drive, start with short distances until you are comfortable behind the wheel, and always remember your seat belt.  You will also be ready at this time to join an outpatient cardiac rehab program.  We highly encourage outpatient cardiac rehab; there your vital signs will be  monitored by nurses and physical therapists while you work to meet your goal of exercising 30 min/day.  By the fourth or fifth week you should also be able to slowly resume sexual activity.  This can be frightening, however talk to your partner about your feelings.  Remember, no lifting anything over 5 pounds, and no upper extremity weight bearing!   Usually by week six you will be ready to return to work, as long as your job does not require any lifting.  The lifting restrictions remain in effect for 12 weeks!  It may have seemed like a long road, but by this time you are usually feeling pretty good!        STERNAL PRECAUTIONS    There is no lifting any more than 5 pounds for 12 weeks!  People do not usually realize how much this may affect them.  A gallon of milk weighs about 8 pounds.  This may also be a window that is hard to open, or a sliding glass door that is heavy to open.  There is no pushing or pulling with the arms, so no pushing a vacuum.  You can not use your arms when getting in/out of a chair or bed or car.  All these rules are in place to give your sternum time to completely heal.  Think of your sternum as a broken bone, and broken bones need time to heal.  Continue to use your heart pillow at home for the first week or two as a reminder not to use your arms.           RESPIRATORY/BREATHING     For the first week at home, continue to use the Incentive Spirometer that you were using in the hospital.  Try to use it 4-5 times each day, doing 10 puffs each time.  During the second week, you can decrease the amount to 3 times each day, doing 10 puffs each time.  Should any unusual shortness of breath occur at any time, notify your doctor immediately.       DIET    For the first week at home, eat what you want!  We just want to make sure that you eat!  It may take a while for your appetite to come back, so maybe six small meals a day will be easier than three large meals.  Also try to limit your fluid  intake to 6-7 cups per day.  Remember to weigh yourself daily and report any sudden weight gain.  By the third week you should start to follow a heart healthy diet.  This means avoiding high salt content foods, and eating a diet balanced with protein, fruit, vegetables, and fiber.        BOWELS    If you do not have a bowel movement within 2 days of being at home, try some prune juice or an over-the-counter laxative.  If there is still no bowel movement in 3 days, try an over-the-counter suppository.  If there is no bowel movement in the first week, notify your doctor immediately.       MEDICATIONS        At discharge from the hospital, your nurse went over all the medications you are going to take at home.  Remember to take your medications on time according to the schedule given to you by your nurse.       PAIN MANAGEMENT    You will be sent home with pain medication to use at home.  Remember to use your pain pills when you need them, don’t “tough it out”! Use your pain pills before activity, at bedtime, and when needed.  By the second or third week you may be able to use Extra Strength Tylenol for pain. Should you experience any pain not relieved by your pain pills, notify your doctor immediately.  Remember, no driving while taking prescription pain meds!       FAMILY    For the first week at home you should try to limit your visitors.  Entertaining even your closest friends and family may tire you out too much.  Remember that it is okay to excuse yourself when you are too tired.  During the second to third week you can start to increase your number of visitors as tolerated.  By the fourth week, enjoy yourself!   It is also important to mention that sometimes following surgery people become depressed.  Family and friends should be aware of signs of depression.  It is important to watch for changes in appetite, changes in sleep patterns, not wanting to see friends/family, not wanting to participate in daily  activities.  Any sign of depression should be reported to the doctor.       WHEN TO CALL THE DOCTOR    As previously mentioned, call the doctor for:   ~ Temperature over 101   ~ Increased redness or drainage from incisions   ~ Unusual shortness of breath   ~ No bowel movement in the first week   ~ Gradual weight gain of 5 pounds in 3-5 days   ~ Increased puffiness or swelling in fingers or ankles   ~ Pain not relieved by pain pills       FOR ANY QUESTIONS OR CONCERNS,     CALL SURGEON'S OFFICE:  [phone]

## 2019-04-11 NOTE — NURSING NOTE
Monitor and PICC d/c'd. Discharge instructions and prescriptions reviewed with and give to pt and wife. Pt and wife verbalized understanding. Pt taken to lobby in wheelchair by transport accompanied by wife for discharge home. Pt took personal belongings, pt own meds returned to pt.

## 2019-04-11 NOTE — PLAN OF CARE
Problem: Patient Care Overview  Goal: Plan of Care Review  Outcome: Ongoing (interventions implemented as appropriate)   04/11/19 0615   Coping/Psychosocial   Plan Of Care Reviewed With patient   Plan of Care Review   Progress no change       Problem: Pressure Ulcer Risk (Justin Scale) (Adult,Obstetrics,Pediatric)  Goal: Skin Integrity  Outcome: Ongoing (interventions implemented as appropriate)      Problem: Fall Risk (Adult)  Goal: Absence of Falls  Outcome: Ongoing (interventions implemented as appropriate)      Problem: Cardiac Surgery (Adult)  Goal: Signs and Symptoms of Listed Potential Problems Will be Absent, Minimized or Managed (Cardiac Surgery)  Outcome: Ongoing (interventions implemented as appropriate)

## 2019-04-11 NOTE — PLAN OF CARE
Problem: Patient Care Overview  Goal: Plan of Care Review  Outcome: Ongoing (interventions implemented as appropriate)   04/11/19 1122   Coping/Psychosocial   Plan Of Care Reviewed With patient;spouse   Plan of Care Review   Progress improving     Goal: Interprofessional Rounds/Family Conf  Outcome: Ongoing (interventions implemented as appropriate)   04/11/19 1122   Interdisciplinary Rounds/Family Conf   Participants ;nursing;advanced practice provider;physical therapy;physician;social work/services;dietitian/nutrition services       Problem: Pressure Ulcer Risk (Justin Scale) (Adult,Obstetrics,Pediatric)  Goal: Skin Integrity  Outcome: Ongoing (interventions implemented as appropriate)   04/11/19 1122   Pressure Ulcer Risk (Justin Scale) (Adult,Obstetrics,Pediatric)   Skin Integrity making progress toward outcome       Problem: Fall Risk (Adult)  Goal: Absence of Falls  Outcome: Ongoing (interventions implemented as appropriate)   04/11/19 1122   Fall Risk (Adult)   Absence of Falls making progress toward outcome       Problem: Cardiac Surgery (Adult)  Goal: Signs and Symptoms of Listed Potential Problems Will be Absent, Minimized or Managed (Cardiac Surgery)  Outcome: Ongoing (interventions implemented as appropriate)   04/11/19 1122   Cardiac Surgery   Problems Assessed (Cardiac Surgery) all   Problems Present (Cardiac Surgery) none

## 2019-04-11 NOTE — DISCHARGE SUMMARY
Cardiac Surgery -  Inpatient Discharge Summary            BRIEF OVERVIEW  Admitting Provider:  H&P Notes 3/12/2019 to 4/11/2019     Date of Service Author Author Type Status Note Type File Time    03/20/19 1000 Forrest An CRNP Nurse Practitioner Signed H&P 03/20/19 1124          Attending Provider: Georgi Pepe MD Attending phys phone: [phone]  Primary Care Physician at Discharge: Colton Condon Jr., [phone]    Admission Date: 3/29/2019     Discharge Date: 4/11/2019    Primary Discharge Diagnosis  Mitral valve insufficiency    Secondary Discharge Diagnosis  Active Hospital Problems    Diagnosis Date Noted   • Pulmonary hypertension (CMS/HCC) ([facility]) 04/02/2019     Priority: High   • Right ventricular dysfunction 04/02/2019     Priority: High   • CHF (congestive heart failure), NYHA class III, chronic, diastolic (CMS/HCC) ([facility]) 03/29/2019   • Mitral valve insufficiency 03/08/2019   • HTN (hypertension) 03/04/2019   • Hyperlipidemia 03/04/2019   • Lung cancer metastatic to brain (CMS/[facility]) 03/04/2019      Resolved Hospital Problems    Diagnosis Date Noted Date Resolved   No resolved problems to display.       DETAILS OF HOSPITAL STAY    Operative Procedures Performed  Procedure(s):  Cardioversion external  ECMO/ECLS cannula removal    Consults:   Consult Notes 3/12/2019 to 4/11/2019     Date of Service Author Author Type Status Note Type File Time    04/08/19 1249 Maddie Lynn MD Physician Signed Consults 04/09/19 0717    04/08/19 1248 Maddie Lynn MD Physician Signed Consults 04/08/19 1249    04/05/19 1324 Glenroy Amaya DO Physician Signed Consults 04/05/19 1325    04/05/19 1323 Glenroy Amaya DO Physician Signed Consults 04/08/19 1355    04/03/19 1657 Sterling Rios MD Physician Signed Consults 04/03/19 1706    04/02/19 1725 Za Muniz DO Fellow Attested Consults 04/02/19 2318    04/01/19 0958 Roselyn Short MD Physician Signed Consults 04/01/19 1013     03/31/19 1144 Berhane Ortiz MD Physician Attested Consults 03/31/19 1542    03/30/19 0948 Yun Alvarez LDN Registered Dietitian Signed Consults 03/30/19 0957          Consult Orders During Admission:  IP CONSULT TO NUTRITION SERVICES  IP CONSULT TO CARDIAC REHAB  IP CONSULT TO CASE MANAGEMENT  IP CONSULT TO PHYSICAL MEDICINE REHAB  IP CONSULT TO PULMONOLOGY/SLEEP MEDICINE  IP CONSULT TO IV TEAM  IP CONSULT TO ENT  IP CONSULT TO INFECTIOUS DISEASE  IP CONSULT TO NEUROLOGY         Imaging  X-ray Chest 2 Views  Result Date: 4/10/2019  IMPRESSION: 1.  Left-sided PICC line is with its tip looped in the region of the brachiocephalic vein, not significantly changed compared to CT of 4/5/2019. 2.  Mild pulmonary vascular congestion, linear opacities, and small right pleural effusion with right basilar opacity, not significantly changed compared to earlier study of the same day.  Improved aeration at the left lung base. Redemonstration of a nodular opacity in the left lung base.    Ct Abdomen Pelvis With Iv Contrast  Result Date: 4/5/2019  IMPRESSION: 1.  No evidence of large central acute pulmonary embolism. No evidence of IVC thrombus as clinically questioned. 2.  Partially loculated right pleural effusion with associated atelectasis and pleural parenchymal scarring throughout the right lung field. Pleural parenchymal nodularity in the right upper lobe is indeterminate in etiology and may represent lymphangitic carcinomatosis. 3.  Right inguinal region high density collection measuring 5.4 x 4.6 cm along the right common femoral artery and vein may represent hematoma or pseudoaneurysm. Recommend correlation with physical examination and ultrasound. 4.  Focus of air in the urinary bladder be related to recent instrumentation; correlate clinically. 5.  Sclerotic lesions throughout the visualized skeleton, consistent with blastic metastases. 6.  See comment for additional findings. Finding:    Other (impression items  1-5) Acuity: Significant  Status:  CLOSED Critical read back was performed and results were read back by Amelia Angelo, 4/5/2019 9:02 PM.     X-ray Chest 1 View  Result Date: 4/10/2019  IMPRESSION: Mild pulmonary vascular congestion, linear opacities, and small right pleural effusion with right basilar opacity, not significantly changed compared to 4/9/2018.  Small left pleural effusion with left basilar opacity, increased compared to the prior study.  Redemonstration of a nodular opacity in the left lung base.    Ct Chest Pulmonary Embolism With Iv Contrast  Result Date: 4/5/2019  IMPRESSION: 1.  No evidence of large central acute pulmonary embolism. No evidence of IVC thrombus as clinically questioned. 2.  Partially loculated right pleural effusion with associated atelectasis and pleural parenchymal scarring throughout the right lung field. Pleural parenchymal nodularity in the right upper lobe is indeterminate in etiology and may represent lymphangitic carcinomatosis. 3.  Right inguinal region high density collection measuring 5.4 x 4.6 cm along the right common femoral artery and vein may represent hematoma or pseudoaneurysm. Recommend correlation with physical examination and ultrasound. 4.  Focus of air in the urinary bladder be related to recent instrumentation; correlate clinically. 5.  Sclerotic lesions throughout the visualized skeleton, consistent with blastic metastases. 6.  See comment for additional findings. Finding:    Other (impression items 1-5) Acuity: Significant  Status:  CLOSED Critical read back was performed and results were read back by Amelia Angelo, 4/5/2019 9:02 PM.     Ultrasound Venous Arm Right  Result Date: 4/5/2019  IMPRESSION: Minimal nonocclusive right IJ thrombus. Discussed with RAPHAEL Brown at 7:30 PM on 4/5/2019    Fl Fluoroscopy Technical Assistance  Result Date: 4/3/2019  IMPRESSION:  Technical assistance was provided for fluoroscopy.  Fluoro time is 0.23943 minutes.   Dose is 27.52 mGy.     Presenting Problem/History of Present Illness  Mitral regurgitation   Patient is a 58 y.o. male here for a surgical evaluation of his mitral regurgitation.  He is referred by Dr. Dempsey.  He has a history of lung cancer diagnosed 5 years ago with mets to the brain that was treated with radiation.  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  This was treated with steroids with good results.  Most recent Brain scan showed improvement.  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  In November he was diagnosed with a murmur and an echo was done that showed mitral regurgitation.  At that time he was experiencing increased BYERS with a significant weight gain (on steroids) as well as edema.  He was started on diuretics.  Recently they were increased to BID with improvement of symptoms.  He does have BYERS with stair climbing but is able to walk flat surfaces without much BYERS.   He rates the BYERS as moderate and it is relieved by rest.  Associated symptoms include LE edema.  He denies fatigue, chest pain, dizziness.  He does have a recent CT that shows a fast growing LLL nodule and a small right pleural effusion.     Exam on Day of Discharge  Physical Exam   Constitutional: He is oriented to person, place, and time. He appears well-developed and well-nourished.   HENT:   Head: Normocephalic and atraumatic.   Eyes: Pupils are equal, round, and reactive to light.   Neck: Normal range of motion. Neck supple.   Cardiovascular: Normal rate, regular rhythm and normal heart sounds.    Pulmonary/Chest: Effort normal and breath sounds normal.   Abdominal: Soft. Bowel sounds are normal.   Musculoskeletal: Normal range of motion.   Neurological: He is alert and oriented to person, place, and time.   Skin: Skin is warm and dry.   Incisioins: Right axilla incision JOE with dermabond. Chest tube sutures removed prior to dc.     Hospital Course  W[place]ley [place] is a 58 y.o. male who underwent mitral  valve repair with 31 mm annuloplasty band, nuchal cord x2 with insertion of Protek duo with oxygenator for RV failure on 3/29/2019 with Dr. Pepe.  Intraoperatively patient received 4 units of PRBC, 4/day, 20 cryo and arrived to CVICU intubated on pressors and Protek.  3/30 patient was extubated to heated high flow drips were weaned anticoagulation was started due to clots in the oxygenator.  4/1 trialed capping of protek continue weaning of inotropes Norvasc started for hypertension.  4/2 echo showed poor RV function continue with protonic and RV support started sildenafil continued milrinone.  Patient continued to require titration of protonic for circa change for circuit DIC.  4/7 patient had 2 minutes loss of consciousness characterized by muscle twitching consciousness returned spontaneously without benzos of 4 patient was started on Keppra post loss of consciousness episode.  Patient prefer not to have CT scan performed as inpatient as December CT and MRI showed enhancing mass lesion and patient will prefer to follow with outpatient neurologist.  4/8 Protek removed cardioversion for A. fib after removal echo showed mild RV dysfunction.  EEG showed no epileptiform discharges or electrographic seizures.  4/9 patient was started on anticoagulation for mitral valve on discharge and will likely need Lovenox bridging.  Patient was taken off of his sildenafil on 4/10 a.m. by Dr. Klein as patient had significant RV recovery and mixed venous post sildenafil discontinuation was 57 will need echo on day of discharge 4/11 to evaluate RV function.  Depending on results of echocardiogram Dr. Klein will decide if patient will need acetaminophen as an outpatient.  Prior to discharge patient will require copies of imaging studies from radiology as well as cardiology for his follow-up with his oncologist and neurologist.  Patient will be followed up with Dr. Pepe for Coumadin dosing via non- program and will have  lab draws facilities near his home. On day of DC pt denied cp, sob, orthopnea, and activity intolerance. Pt was hemodynamically stable. DC instructions were reviewed with pt and prescriptions were given. Pt was able to teach back instructions for medication regimen and understands the need to follow make appropriate follow up schedule per DC instructions      Assessment and Plan  Pulmonary hypertension (CMS/HCC) (Formerly [facility])   Assessment & Plan    sildandifinil 20mg TID on hold for rapidly improving RV MVO2 overnight ECHO done on day of discharge revealed improving RV function and no need for Sildenafil at this time     CHF (congestive heart failure), NYHA class III, chronic, diastolic (CMS/HCC) (Former[facility])   Assessment & Plan    His preop ejection fraction is 45%    Continue Crestor  Continue Lasix 40 PO BID     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)   Assessment & Plan    Patient with a history of metastatic stage IV lung cancer with metastatic disease to the brain status post chemo and radiation (brain only)    Continue his home Lorlatinib  CT imaging given to the patient on discs for patient to review with his oncologist     Hyperlipidemia   Assessment & Plan    Continue home Crestor     HTN (hypertension)   Assessment & Plan    Home Lisinopril and BB restarted  dw Dr. Klein regarding restarting afterload reductionat DC     * Mitral valve insufficiency   Assessment & Plan    Status post mitral valve repair with a number 31 mm annuloplasty band and dora-chord repair x2 on 3/29/2019 the patient did have some RV dysfunction intraoperatively which required insertion of a Protec 2 oh with an oxygenator via the right subclavian vein.  -Intraoperatively the patient did require 4 units of packed red blood cells, 4 platelets, 20 cryo   -Patient was extubated on 3/30/2019 with Protec still in place and the oxygenator at 50%    Plan to wean Protek duo Monday to re eval need   Sildanifil 20 TID for some pulm hypertension - held since  4/10 with MVO2 @ 57 in the evening. ECHO repeated prior to DC  Continue heparin drip for a Protec duo as well as his mitral valve repair  Continue aspirin, Crestor  Continue Lasix 40 PO BID  Continue colchicine for PPS prevention   Continue Pepcid for GI prophylaxis  Encourage I-S, ambulation and out of bed to chair when able             Discharge Orders  Discharge Orders Needing Review     Order Details Provider Order Origin    chlorhexidine (PERIDEX) 0.12 % solution Swish and spit 15 mL 2 (two) times a day. Ajit Alcaraz MD Prior to Admission    furosemide (LASIX) 40 mg tablet Take 40 mg by mouth 2 (two) times a day. Ajit Alcaraz MD Prior to Admission    lorlatinib (LORBRENA) 25 mg tablet Take 75 mg by mouth daily. Ajit Alcaraz MD Prior to Admission    metoprolol succinate XL (TOPROL-XL) 25 mg 24 hr tablet Take 25 mg by mouth daily. Ajit Alcaraz MD Prior to Admission    mupirocin (BACTROBAN) 2 % ointment Apply 1 application topically 2 (two) times a day for 5 days. Nasal application, starting 5 days before surgery Kimberly Mello CRNP Prior to Admission    potassium chloride (KLOR-CON ORAL) Take 10 mEq by mouth. Every other day   Ajit Alcaraz MD Prior to Admission    rosuvastatin (CRESTOR) 10 mg tablet Take 10 mg by mouth daily. Ajit Alcaraz MD Prior to Admission    acetaminophen (TYLENOL) tablet 975 mg 975 mg, oral, Every 6 hours, First dose on Sat 3/30/19 at 1200, For 90 days Zakiya Amor CRNP Inpatient    alum-mag hydroxide-simeth (MAALOX) 200-200-20 mg/5 mL suspension 30 mL 30 mL, oral, Every 4 hours PRN, indigestion, Starting Fri 3/29/19 at 1719, For 90 days** Shake well before administration ** Zakiya Amor CRNP Inpatient    amiodarone (PACERONE) tablet 400 mg 400 mg, oral, 2 times daily, First dose on Thu 4/4/19 at 0900 Zakiya Amor CRNP Inpatient    aspirin enteric coated tablet 81 mg 81 mg, oral, Daily, First dose  on Fri 3/29/19 at 1815Hold for platelet count < 50,000 ** Do not crush, chew, or barreto **  Zakiya Amor CRNP Inpatient    benzocaine-menthol (CEPACOL) lozenge 1 lozenge 1 lozenge, Mouth/Throat, Every 2 hour PRN, sore throat, Starting Sun 4/7/19 at 1921 Zakiya Amor CRNP Inpatient    benzonatate (TESSALON) capsule 100 mg 100 mg, oral, 3 times daily PRN, cough, Starting Sun 4/7/19 at 1921 Zakiya Amor CRNP Inpatient    camphor-methyl salicyl-menthol (MUSCLE RUB) cream Topical, As needed, muscle/joint pain, Starting Sun 4/7/19 at 1946Apply to back Zakiya Amor CRNP Inpatient    cetirizine (ZyrTEC) tablet 10 mg 10 mg, oral, Nightly, First dose on Sun 4/7/19 at 2200 Zakiya Amor CRNP Inpatient    chlorhexidine (PERIDEX) 0.12 % mouthwash 15 mL 15 mL, Mouth/Throat, User specified (2 times per day), First dose on Fri 3/29/19 at 2200 Zakiya Amor CRNP Inpatient    colchicine (COLCRYS) tablet 0.6 mg 0.6 mg, oral, Daily, First dose on Fri 3/29/19 at 1815 Zakiya Amor CRNP Inpatient    dextrose in water injection 5-15 g 5-15 g (10-30 mL), intravenous, As needed, low blood sugar, To be used only with the software-calculated insulin infusion., Starting Fri 3/29/19 at 1719If glucose 70-79 mg/dL Give 10 mL IV push; If glucose 60-69 mg/dL Give 15 mL IV push; If glucose 50-59 mg/dL Give 20 mL IV push; If glucose 30-49 mg/dL Give 25 mL IV push; If glucose under 30 mg/dL Give 30 mL IV push Zakiya Amor CRNP Inpatient    diphenhydrAMINE (BENADRYL) capsule 25 mg 25 mg, oral, Every 6 hours PRN, itching, itching/pruritis, Starting Fri 3/29/19 at 1719, For 90 days Zakiya Amor CRNP Inpatient    docusate sodium (COLACE) capsule 100 mg 100 mg, oral, 2 times daily, First dose on Fri 3/29/19 at 2000, For 90 daysHold for diarrhea Zakiya Amor CRNP Inpatient    famotidine (PEPCID) tablet 20 mg 20 mg, oral, 2 times daily, First  dose on Fri 3/29/19 at 2000, For 90 days Zakiya Amor CRNP Inpatient    folic acid (FOLVITE) tablet 1 mg 1 mg, oral, Daily, First dose on Sat 3/30/19 at 1300 Zakiya Amor CRNP Inpatient    furosemide (LASIX) tablet 40 mg 40 mg, oral, Daily, First dose on Wed 4/10/19 at 0900, For 90 days Zakiya Amor CRNP Inpatient    ipratropium-albuterol (DUO-NEB) 0.5-2.5 mg/3 mL nebulizer solution 3 mL 3 mL, nebulization, Every 6 hours, First dose on Sun 3/31/19 at 1315While awake Zakiya Amor CRNP Inpatient    levETIRAcetam (KEPPRA) tablet 500 mg 500 mg, oral, 2 times daily, First dose on Sun 4/7/19 at 2000This oral dosage form is not recommended to be crushed because the taste may be unacceptable to the patient. When administered via tube, may be mixed in a small amount of water then immediately administered   Zakiya Amor CRNP Inpatient    lisinopril (PRINIVIL) 20 mg tablet Take 20 mg by mouth 2 (two) times a day. Provider, MD Ajit Prior to Admission    lorlatinib tablet 75 mg 75 mg, oral, Daily, First dose on Sat 3/30/19 at 1730, For 29 dosesTake 3 tablets = 75 mg orally dailyReason for Non-Formulary Request: Patient’s medical condition warrants continuation of existing medication therapy from home or external treatment facilityDrug Name: Lorbrena 25 mg tabletsForm: capsuleIndication for Therapy: Metastatic Lung CancerLength of Therapy: Indefinite Zakiya Amor CRNP Inpatient    magnesium oxide (MAG-OX) tablet 400 mg 400 mg, oral, 2 times daily, First dose on Tue 4/2/19 at 0900, For 90 daysHold if Magnesium level >= 2.8 mg/dL and notify physician Zakiya Amor CRNP Inpatient    melatonin ODT 6 mg 6 mg, oral, Nightly PRN, sleep, Starting Thu 4/4/19 at 2043TLH subst for zolpidem 5 mg for pt not on zolpidem at home. Zakiya Amor, CARIE Inpatient    multivitamin tablet 1 tablet 1 tablet, oral, Daily, First dose on Fri 3/29/19 at  1815, For 90 days Zakiya Amor CRNP Inpatient    mupirocin (BACTROBAN) 2 % ointment 1 application 1 application, Topical, 3 times daily, First dose on Fri 4/5/19 at 0930Apply to bilateral nares Zakiya Amor CRNP Inpatient    naproxen (NAPROSYN) tablet 250 mg 250 mg, oral, 2 times daily with meals, First dose on Tue 4/9/19 at 1700 Zakiya Amor CRNP Inpatient    ondansetron ODT (ZOFRAN-ODT) disintegrating tablet 4 mg 4 mg, oral, Every 8 hours PRN, nausea, vomiting, Nausea/Vomiting, Starting Fri 3/29/19 at 1719, For 90 days Zakiya Amor CRNP Inpatient    oxyCODONE (ROXICODONE) immediate release tablet 10 mg 10 mg, oral, Every 6 hours PRN, pain, for severe pain, Starting Sun 3/31/19 at 0648 Zakiya Amor CRNP Inpatient    oxyCODONE (ROXICODONE) immediate release tablet 5 mg 5 mg, oral, Every 4 hours PRN, pain, For Sever Pain 8-10 when taking Orals, Starting Sat 3/30/19 at 1714For moderate pain Zakiya Amor CRNP Inpatient    potassium chloride (KLOR-CON) tablet extended release 20 mEq 20 mEq, oral, 2 times daily, First dose on Fri 3/29/19 at 2000** Do not crush, chew, or barreto **  Zakiya Amor CRNP Inpatient    rosuvastatin (CRESTOR) tablet 10 mg 10 mg, oral, Daily (6p), First dose on Fri 3/29/19 at 1800 Zakiya Amor CRNP Inpatient    senna (SENOKOT) tablet 1 tablet 1 tablet, oral, 2 times daily PRN, constipation, IF COLACE UNSUCCESSFUL, Starting Fri 3/29/19 at 1719, For 90 days Zakiya Amro CRNP Inpatient    sodium chloride (OCEAN) 0.65 % nasal spray 2 spray 2 spray, Each Nostril, 4 times daily, First dose on Wed 4/3/19 at 2200 Zakiya Amor CRNP Inpatient    sodium chloride flush 10 mL 10 mL, intravenous, Every 8 hours interval, First dose on Tue 4/2/19 at 0915PICC care and maintenanceIndications: maintain patency of indwelling intravenous catheter Zakiya Amor CRNP Inpatient    sodium  "chloride flush 10 mL 10 mL, intravenous, As needed, line care, PICC care and maintenance, Starting Tue 4/2/19 at 0816Indications: maintain patency of indwelling intravenous catheter Zakiya Amor CRNP Inpatient    thiamine (VITAMIN B1) tablet 100 mg 100 mg, oral, Daily, First dose on Sat 3/30/19 at 1300 Zakiya Amor CRNP Inpatient      Released Discharge Orders     Order Details Provider Status    enoxaparin (LOVENOX) 40 mg/0.4 mL syringe Inject 0.4 mL (40 mg total) under the skin every 12 (twelve) hours for 10 doses. Minor Bates CRNP Prescribed    Follow-up with Provider: Referral By ANTIONETTE JONES 1 visitInstructions for follow-up: Follow up appointment with Dr Pepe on 5[facility]21 @ 11:[facility] section \"[facility]\". Minor Bates CRNP New at Discharge          Outpatient Follow-Ups            In 1 month Georgi Pepe MD [facility] at [facility]        Referrals:  No orders of the defined types were placed in this encounter.      Discharge Disposition  HOME  Code Status at Discharge: Full Code  Physician Order for Life-Sustaining Treatment Document Status      No documents found                CARIE Yan  4/11/2019    "

## 2019-04-12 LAB — INR PPP: 1.3

## 2019-04-15 ENCOUNTER — TELEPHONE (OUTPATIENT)
Dept: SCHEDULING | Facility: CLINIC | Age: 59
End: 2019-04-15

## 2019-04-15 ENCOUNTER — ANTICOAGULATION VISIT (OUTPATIENT)
Dept: SCHEDULING | Facility: CLINIC | Age: 59
End: 2019-04-15

## 2019-04-15 DIAGNOSIS — I34.0 NON-RHEUMATIC MITRAL REGURGITATION: ICD-10-CM

## 2019-04-15 DIAGNOSIS — Z98.890 H/O MITRAL VALVE REPAIR: ICD-10-CM

## 2019-04-15 LAB — INR PPP: 1.2

## 2019-04-15 RX ORDER — ENOXAPARIN SODIUM 100 MG/ML
40 INJECTION SUBCUTANEOUS
Qty: 10 SYRINGE | Refills: 0 | Status: SHIPPED | OUTPATIENT
Start: 2019-04-15 | End: 2019-04-19 | Stop reason: SDUPTHER

## 2019-04-15 NOTE — TELEPHONE ENCOUNTER
Home care nurse, Heidi called to report pt INR    1.21    Any questions, please contact Heidi at  or contact pt for dosing instructions.

## 2019-04-15 NOTE — TELEPHONE ENCOUNTER
Sendy, patients wife called stating   there is a discrepency between what Hawa and Home health nurse told them. Please call Sendy at . [phone] to discuss.

## 2019-04-17 ENCOUNTER — ANTICOAGULATION VISIT (OUTPATIENT)
Dept: SCHEDULING | Facility: CLINIC | Age: 59
End: 2019-04-17

## 2019-04-17 DIAGNOSIS — I34.0 NON-RHEUMATIC MITRAL REGURGITATION: ICD-10-CM

## 2019-04-17 DIAGNOSIS — Z98.890 H/O MITRAL VALVE REPAIR: ICD-10-CM

## 2019-04-17 LAB — INR PPP: 1.4

## 2019-04-17 NOTE — TELEPHONE ENCOUNTER
Pt's spouse, Heidi calling to speak with Hawa. Pt's spouse was told to contact office today regarding INR results.     She can be reached at [phone]

## 2019-04-17 NOTE — TELEPHONE ENCOUNTER
Spoke to [facility] (Heidi) to obtain INR. Called and spoke to spouse, Destiny and reviewed coumadin dosing and directions. See coumadin template. Thank you.

## 2019-04-19 ENCOUNTER — TELEPHONE (OUTPATIENT)
Dept: CARDIOLOGY | Facility: CLINIC | Age: 59
End: 2019-04-19

## 2019-04-19 ENCOUNTER — ANTICOAGULATION VISIT (OUTPATIENT)
Dept: CARDIOLOGY | Facility: CLINIC | Age: 59
End: 2019-04-19

## 2019-04-19 DIAGNOSIS — Z98.890 H/O MITRAL VALVE REPAIR: ICD-10-CM

## 2019-04-19 DIAGNOSIS — I34.0 NON-RHEUMATIC MITRAL REGURGITATION: ICD-10-CM

## 2019-04-19 LAB — INR PPP: 1.8

## 2019-04-19 RX ORDER — ENOXAPARIN SODIUM 100 MG/ML
40 INJECTION SUBCUTANEOUS
Qty: 10 SYRINGE | Refills: 0 | Status: SHIPPED | OUTPATIENT
Start: 2019-04-19 | End: 2019-05-21

## 2019-04-19 NOTE — TELEPHONE ENCOUNTER
Pt of Dr Pepe - Pt INR trending low, 1.2 on Monday, 1.4 on wed.  Was given 10mg m/tu/w/th and was to check stat INR today. Pt has been on lovenox 40mg bid as well.      Pt went to [facility] and stat slip was sent, however, INR was not run stat and INR was not resulted by end of day on 4/19.     Spoke w/Dr Argueta who advised no coumadin or lovenox tonight or through the weekend unless otherwise advised by our office.     I spoke w/Che Oconnell and also w/ct surgery Leonides and Yun Huang.  Ct surgery will call [facility] tomorrow morning for INR result and will contact Dr Argueta w/result. If INR >10 pt is to go to ED.     Spoke w/pt's spouse and directed no coumadin or lovenox tonight, coumadin dose of 7.5mg was already taken.  Called back spouse after speaking w/Dr Argueta again and LM on spouse's vm to not take any coumadin or lovenox through the weekend unless otherwise advised by ct surgery or Dr Argueta.     Per 1st conversation w/spouse, no abnormal bruising or bleeding but pt did have some bleeding w/lovenox injections yesterday so he refused the injection this morning.

## 2019-04-20 ENCOUNTER — TELEPHONE (OUTPATIENT)
Dept: CARDIOTHORACIC SURGERY | Facility: HOSPITAL | Age: 59
End: 2019-04-20

## 2019-04-20 LAB
INR PPP: 1.8 (ref 0.8–1.2)
PROTHROMBIN TIME: 17.8 SEC (ref 9.1–12)

## 2019-04-22 ENCOUNTER — ANTICOAGULATION VISIT (OUTPATIENT)
Dept: CARDIOLOGY | Facility: CLINIC | Age: 59
End: 2019-04-22

## 2019-04-22 ENCOUNTER — ANTICOAGULATION VISIT (OUTPATIENT)
Dept: SCHEDULING | Facility: CLINIC | Age: 59
End: 2019-04-22

## 2019-04-22 DIAGNOSIS — Z98.890 H/O MITRAL VALVE REPAIR: ICD-10-CM

## 2019-04-22 DIAGNOSIS — I34.0 NON-RHEUMATIC MITRAL REGURGITATION: ICD-10-CM

## 2019-04-22 LAB — INR PPP: 2

## 2019-04-24 ENCOUNTER — ANTICOAGULATION VISIT (OUTPATIENT)
Dept: SCHEDULING | Facility: CLINIC | Age: 59
End: 2019-04-24

## 2019-04-24 DIAGNOSIS — I34.0 NON-RHEUMATIC MITRAL REGURGITATION: ICD-10-CM

## 2019-04-24 DIAGNOSIS — Z98.890 H/O MITRAL VALVE REPAIR: ICD-10-CM

## 2019-04-25 ENCOUNTER — ANTICOAGULATION VISIT (OUTPATIENT)
Dept: CARDIOLOGY | Facility: CLINIC | Age: 59
End: 2019-04-25

## 2019-04-25 ENCOUNTER — TELEPHONE (OUTPATIENT)
Dept: SCHEDULING | Facility: CLINIC | Age: 59
End: 2019-04-25

## 2019-04-25 DIAGNOSIS — I34.0 NON-RHEUMATIC MITRAL REGURGITATION: ICD-10-CM

## 2019-04-25 DIAGNOSIS — Z98.890 H/O MITRAL VALVE REPAIR: ICD-10-CM

## 2019-04-25 DIAGNOSIS — Z95.4 H/O MITRAL VALVE REPLACEMENT WITH TISSUE GRAFT: Primary | ICD-10-CM

## 2019-04-25 LAB — INR PPP: 2

## 2019-04-25 NOTE — TELEPHONE ENCOUNTER
Destiny pt's wife would like to follow up with APOORVA Shaikh in regards to coumadin results. Destiny can be reached at .

## 2019-04-25 NOTE — TELEPHONE ENCOUNTER
Patient of Dr Pepe- Spoke w/patient spouse confirming therapeutic INR. INR 2.0 , Dosing given and next INr on Mon.  Faxed INR to [facility] Thank you

## 2019-04-26 ENCOUNTER — TELEPHONE (OUTPATIENT)
Dept: CARDIOLOGY | Facility: CLINIC | Age: 59
End: 2019-04-26

## 2019-04-26 RX ORDER — WARFARIN SODIUM 5 MG/1
TABLET ORAL
Qty: 60 TABLET | Refills: 1 | Status: SHIPPED | OUTPATIENT
Start: 2019-04-26 | End: 2019-05-21

## 2019-04-29 ENCOUNTER — TELEPHONE (OUTPATIENT)
Dept: CARDIOLOGY | Facility: CLINIC | Age: 59
End: 2019-04-29

## 2019-04-29 ENCOUNTER — TELEPHONE (OUTPATIENT)
Dept: SCHEDULING | Facility: CLINIC | Age: 59
End: 2019-04-29

## 2019-04-29 ENCOUNTER — ANTICOAGULATION VISIT (OUTPATIENT)
Dept: SCHEDULING | Facility: CLINIC | Age: 59
End: 2019-04-29

## 2019-04-29 DIAGNOSIS — Z98.890 H/O MITRAL VALVE REPAIR: ICD-10-CM

## 2019-04-29 DIAGNOSIS — I34.0 NON-RHEUMATIC MITRAL REGURGITATION: ICD-10-CM

## 2019-04-29 LAB — INR PPP: 2.2

## 2019-04-29 NOTE — TELEPHONE ENCOUNTER
Spoke to pt's wife and she will obtain echo from 4/23/19 on disc. Pt has a f/u w his Cardiologist today. I will call for note tomorrow. Dr. Dempsey [phone]

## 2019-05-02 ENCOUNTER — ANTICOAGULATION VISIT (OUTPATIENT)
Dept: SCHEDULING | Facility: CLINIC | Age: 59
End: 2019-05-02

## 2019-05-02 DIAGNOSIS — I34.0 NON-RHEUMATIC MITRAL REGURGITATION: ICD-10-CM

## 2019-05-02 DIAGNOSIS — Z98.890 H/O MITRAL VALVE REPAIR: ICD-10-CM

## 2019-05-02 LAB
INR PPP: 1.9 (ref 0.8–1.2)
PROTHROMBIN TIME: 19.5 SEC (ref 9.1–12)

## 2019-05-06 LAB — INR PPP: 1.7

## 2019-05-07 ENCOUNTER — ANTICOAGULATION VISIT (OUTPATIENT)
Dept: SCHEDULING | Facility: CLINIC | Age: 59
End: 2019-05-07

## 2019-05-07 DIAGNOSIS — I34.0 NON-RHEUMATIC MITRAL REGURGITATION: ICD-10-CM

## 2019-05-07 DIAGNOSIS — Z98.890 H/O MITRAL VALVE REPAIR: ICD-10-CM

## 2019-05-10 ENCOUNTER — ANTICOAGULATION VISIT (OUTPATIENT)
Dept: SCHEDULING | Facility: CLINIC | Age: 59
End: 2019-05-10

## 2019-05-10 DIAGNOSIS — I34.0 NON-RHEUMATIC MITRAL REGURGITATION: ICD-10-CM

## 2019-05-10 DIAGNOSIS — Z98.890 H/O MITRAL VALVE REPAIR: ICD-10-CM

## 2019-05-10 LAB
INR PPP: 2 (ref 0.8–1.2)
PROTHROMBIN TIME: 19.7 SEC (ref 9.1–12)

## 2019-05-17 ENCOUNTER — ANTICOAGULATION VISIT (OUTPATIENT)
Dept: SCHEDULING | Facility: CLINIC | Age: 59
End: 2019-05-17

## 2019-05-17 DIAGNOSIS — Z98.890 H/O MITRAL VALVE REPAIR: ICD-10-CM

## 2019-05-17 DIAGNOSIS — I34.0 NON-RHEUMATIC MITRAL REGURGITATION: ICD-10-CM

## 2019-05-21 ENCOUNTER — OFFICE VISIT (OUTPATIENT)
Dept: CARDIOTHORACIC SURGERY | Facility: CLINIC | Age: 59
End: 2019-05-21
Payer: COMMERCIAL

## 2019-05-21 ENCOUNTER — TELEPHONE (OUTPATIENT)
Dept: CARDIOLOGY | Facility: CLINIC | Age: 59
End: 2019-05-21

## 2019-05-21 ENCOUNTER — HOSPITAL ENCOUNTER (OUTPATIENT)
Dept: RADIOLOGY | Facility: HOSPITAL | Age: 59
Discharge: HOME | End: 2019-05-21
Attending: NURSE PRACTITIONER
Payer: COMMERCIAL

## 2019-05-21 ENCOUNTER — ANTICOAGULATION VISIT (OUTPATIENT)
Dept: CARDIOLOGY | Facility: CLINIC | Age: 59
End: 2019-05-21
Payer: COMMERCIAL

## 2019-05-21 VITALS
HEIGHT: 72 IN | BODY MASS INDEX: 33.18 KG/M2 | OXYGEN SATURATION: 96 % | SYSTOLIC BLOOD PRESSURE: 130 MMHG | HEART RATE: 70 BPM | WEIGHT: 245 LBS | DIASTOLIC BLOOD PRESSURE: 90 MMHG | TEMPERATURE: 98.9 F

## 2019-05-21 DIAGNOSIS — I34.0 NON-RHEUMATIC MITRAL REGURGITATION: ICD-10-CM

## 2019-05-21 DIAGNOSIS — Z98.890 H/O MITRAL VALVE REPAIR: ICD-10-CM

## 2019-05-21 DIAGNOSIS — Z98.890 H/O MITRAL VALVE REPAIR: Primary | ICD-10-CM

## 2019-05-21 LAB — INTERNATIONAL NORMALIZATION RATIO, POC: 1.4

## 2019-05-21 PROCEDURE — 99024 POSTOP FOLLOW-UP VISIT: CPT | Performed by: THORACIC SURGERY (CARDIOTHORACIC VASCULAR SURGERY)

## 2019-05-21 PROCEDURE — 71046 X-RAY EXAM CHEST 2 VIEWS: CPT

## 2019-05-21 PROCEDURE — 85610 PROTHROMBIN TIME: CPT | Performed by: INTERNAL MEDICINE

## 2019-05-21 NOTE — LETTER
May 22, 2019     Colton Condon Jr., MD  [address] [facility] [address]    Patient: Wesley Fields   YOB: 1960   Date of Visit: 5/21/2019       Dear Dr. Condon:    Thank you for referring Wesley Fields to me for evaluation. Below are my notes for this consultation.    If you have questions, please do not hesitate to call me. I look forward to following your patient along with you.         Sincerely,        Georgi Pepe MD        CC: MD My Lewis Scott M, MD  5/22/2019 12:58 PM  Signed  I, Georgi Pepe MD, personally performed the services described in this documentation as scribed by CARIE Worthington in my presence, and it is both accurate and complete.    5/22/2019 12:58 PM      Georgi Pepe MD  5/22/2019 12:58 PM  Signed  CARDIOVASCULAR SURGERY FOLLOW-UP PROGRESS NOTE    Subjective   Patient is a 58 y.o. male who presents for follow up s/p mitral valve repair with a # 34 Greco Annuloplasty band and 3 sets of Alexandre Chordes.  Post op he required an RVAD for RV dysfunction.THis improved over time and the RVAD was removed.   He comes in today feeling pretty good.  He does have complaints of minor shortness of breath with strenuous activity.  His activity level has been good.         Current Outpatient Prescriptions   Medication Sig Dispense Refill   • amiodarone (PACERONE) 400 mg tablet Take 1 tablet (400 mg total) by mouth daily. (Patient taking differently: Take 200 mg by mouth daily.  ) 30 tablet 3   • aspirin 81 mg enteric coated tablet Take 1 tablet (81 mg total) by mouth daily. 30 tablet 3   • lisinopril (PRINIVIL) 5 mg tablet Take 1 tablet (5 mg total) by mouth daily. 30 tablet 3   • lorlatinib (LORBRENA) 25 mg tablet Take 75 mg by mouth daily.     • metoprolol succinate XL (TOPROL-XL) 25 mg 24 hr tablet Take 50 mg by mouth daily.       • potassium chloride (K-TAB) 20 mEq CR tablet Take 1 tablet (20 mEq total) by mouth 2 (two) times a day. 60  tablet 3   • rosuvastatin (CRESTOR) 10 mg tablet Take 10 mg by mouth daily.     • TORSEMIDE ORAL Take by mouth. 20mg AM and 10mg PM      • colchicine (COLCRYS) 0.6 mg tablet Take 1 tablet (0.6 mg total) by mouth daily. (Patient not taking: Reported on 5/21/2019 ) 30 tablet 0   • enoxaparin (LOVENOX) 40 mg/0.4 mL syringe Inject 0.4 mL (40 mg total) under the skin every 12 (twelve) hours. (Patient not taking: Reported on 5/21/2019 ) 10 Syringe 0   • furosemide (LASIX) 40 mg tablet Take 40 mg by mouth 2 (two) times a day.     • naproxen (NAPROSYN) 250 mg tablet Take 2 tablets (500 mg total) by mouth 2 (two) times a day with meals. (Patient not taking: Reported on 5/21/2019 ) 120 tablet 0   • warfarin (COUMADIN) 5 mg tablet Take one and one half tablets to two tablets daily as directed by MD based on INR results (Patient not taking: Reported on 5/21/2019 ) 60 tablet 1     No current facility-administered medications for this visit.      Objective   /90 (BP Location: Left upper arm, Patient Position: Sitting)   Pulse 70   Temp 37.2 °C (98.9 °F) (Oral)   Ht 1.829 m (6')   Wt 111 kg (245 lb)   SpO2 96%   BMI 33.23 kg/m²    Heart:  regular rate and rhythm, S1, S2 normal, no murmur, click, rub or gallop  Lungs:  diminished breath sounds R base  Extremities:  1+ lower extremity edema bilaterally  Incision(s):  Right lateral thoracotomy clean dry and intact.  + edema.  No erythema.        Assessment/Plan   S/P mitral valve repair, # 34 Greco Band and 3 sets of Alexandre chordes.  Post op her had RV dysfunction requiring mechanical support with a RVAD/ECMO.  Post-op pleural effusion  No heavy lifting > 10 pounds for 5 more weeks  Keep incisions clean and dry  OK to begin cardiac rehab  Dental prophylaxis reiterated.  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    Ongoing medical management with cardiology  Outpatient cardiac rehab script provided  Follow up  with an echo in 1 year with  DR. Pepe

## 2019-05-21 NOTE — TELEPHONE ENCOUNTER
Patient of Dr Pepe- Patient sp Mitral valve repair.  Patient at [facility] INR 1.4 (therapeutic INR 2-3) Patient INr on Thursday 5/16 1.5. Instructed to take 12.5mg Friday and resume LVL 32 (10mg daily). Patient woke up Saturday 5/18 w a lot of Blood in mouth. Spouse called lamont Quan and was instructed to hold coumadin Saturday and go to lab for INR but lab closed.  His local cardiologist instructed to hold coumadin and aspirin 5/18,5/19 and 5/20 INR 1.4 today. He would like to discuss w Dr Pepe today stopping coumadin. Had small amount blood in mouth on Sunday. None Monday or Tuesday.  Please advise.Patient can be reached at  thank you

## 2019-05-21 NOTE — PROGRESS NOTES
CARDIOVASCULAR SURGERY FOLLOW-UP PROGRESS NOTE    Subjective   Patient is a 58 y.o. male who presents for follow up s/p mitral valve repair with a # 34 Greco Annuloplasty band and 3 sets of Alexandre Chordes.  Post op he required an RVAD for RV dysfunction.THis improved over time and the RVAD was removed.   He comes in today feeling pretty good.  He does have complaints of minor shortness of breath with strenuous activity.  His activity level has been good.         Current Outpatient Prescriptions   Medication Sig Dispense Refill   • amiodarone (PACERONE) 400 mg tablet Take 1 tablet (400 mg total) by mouth daily. (Patient taking differently: Take 200 mg by mouth daily.  ) 30 tablet 3   • aspirin 81 mg enteric coated tablet Take 1 tablet (81 mg total) by mouth daily. 30 tablet 3   • lisinopril (PRINIVIL) 5 mg tablet Take 1 tablet (5 mg total) by mouth daily. 30 tablet 3   • lorlatinib (LORBRENA) 25 mg tablet Take 75 mg by mouth daily.     • metoprolol succinate XL (TOPROL-XL) 25 mg 24 hr tablet Take 50 mg by mouth daily.       • potassium chloride (K-TAB) 20 mEq CR tablet Take 1 tablet (20 mEq total) by mouth 2 (two) times a day. 60 tablet 3   • rosuvastatin (CRESTOR) 10 mg tablet Take 10 mg by mouth daily.     • TORSEMIDE ORAL Take by mouth. 20mg AM and 10mg PM      • colchicine (COLCRYS) 0.6 mg tablet Take 1 tablet (0.6 mg total) by mouth daily. (Patient not taking: Reported on 5/21/2019 ) 30 tablet 0   • enoxaparin (LOVENOX) 40 mg/0.4 mL syringe Inject 0.4 mL (40 mg total) under the skin every 12 (twelve) hours. (Patient not taking: Reported on 5/21/2019 ) 10 Syringe 0   • furosemide (LASIX) 40 mg tablet Take 40 mg by mouth 2 (two) times a day.     • naproxen (NAPROSYN) 250 mg tablet Take 2 tablets (500 mg total) by mouth 2 (two) times a day with meals. (Patient not taking: Reported on 5/21/2019 ) 120 tablet 0   • warfarin (COUMADIN) 5 mg tablet Take one and one half tablets to two tablets daily as directed by MD  based on INR results (Patient not taking: Reported on 5/21/2019 ) 60 tablet 1     No current facility-administered medications for this visit.      Objective   /90 (BP Location: Left upper arm, Patient Position: Sitting)   Pulse 70   Temp 37.2 °C (98.9 °F) (Oral)   Ht 1.829 m (6')   Wt 111 kg (245 lb)   SpO2 96%   BMI 33.23 kg/m²   Heart:  regular rate and rhythm, S1, S2 normal, no murmur, click, rub or gallop  Lungs:  diminished breath sounds R base  Extremities:  1+ lower extremity edema bilaterally  Incision(s):  Right lateral thoracotomy clean dry and intact.  + edema.  No erythema.        Assessment/Plan   S/P mitral valve repair, # 34 Greco Band and 3 sets of Alexandre chordes.  Post op her had RV dysfunction requiring mechanical support with a RVAD/ECMO.  Post-op pleural effusion  No heavy lifting > 10 pounds for 5 more weeks  Keep incisions clean and dry  OK to begin cardiac rehab  Dental prophylaxis reiterated.  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    Ongoing medical management with cardiology  Outpatient cardiac rehab script provided  Follow up with an echo in 1 year with  DR. Pepe

## 2019-05-22 NOTE — PROGRESS NOTES
I, Georgi Pepe MD, personally performed the services described in this documentation as scribed by CARIE Worthington in my presence, and it is both accurate and complete.    5/22/2019 12:58 PM

## 2019-06-10 ENCOUNTER — TELEPHONE (OUTPATIENT)
Dept: SCHEDULING | Facility: CLINIC | Age: 59
End: 2019-06-10

## 2019-06-10 NOTE — TELEPHONE ENCOUNTER
Heidi/spouse calling requesting c/b from nurse stating that  will possibly be getting a tooth pulled later this afternoon at the dentist, and Heidi wants to know if procedure is ok to do since  was prescribed antibiotic Levaquin yesterday for a URI, and dentist would like to prescribe Amoxicillin after pulling tooth.    Heidi would like a c/b to discuss at [phone]

## 2020-04-23 ENCOUNTER — TELEPHONE (OUTPATIENT)
Dept: SCHEDULING | Facility: CLINIC | Age: 60
End: 2020-04-23

## 2020-04-23 NOTE — TELEPHONE ENCOUNTER
Spoke to office informing them that patient was to f/u cardiologist for OV and ECHO. When I spoke to patient this am he said he would be giving their office a call today or tomorrow to try and schedule something with them

## 2020-04-23 NOTE — TELEPHONE ENCOUNTER
Pamela, with [facility], states they received a fax from our office.  Pamela states on the fax all that was listed was TTE and a date of 5/2019.  Pamela wanted to know if that means we want them to schedule a TTE with the pt.    Pamela also wanted it noted that Dr Maribell Dempsey is no longer practicing with [facility] and the pt hasn't advised their office if he is staying with them or not.  Pamela can be reached at [phone],

## 2020-04-23 NOTE — TELEPHONE ENCOUNTER
Dinah just called back and routing the echo order through epic isn't working. Could you print out the echo order and fax it to Dr. Brantley office please?

## 2024-07-31 NOTE — PROGRESS NOTES
CHIEF COMPLAINT  Chief Complaint   Patient presents with   • Urinary Frequency     HISTORY OF PRESENT ILLNESS:    HPI The patient is a 67 y/o female who presents with a 3-day history of dysuria, urinary frequency and urgency.  She reports having a very minor pain in her left side but only if she sits up against something.  She denies fever, nausea, vomiting, diarrhea, significant back pain, abdominal pain, hematuria, vaginal bleeding, vaginal discharge, rash, or any other concerns.  The patient reports that this is similar to previous urinary tract infections she has had in the past.    The following portions of the patient's history were reviewed including the  nurses' notes and the following were updated as appropriate: allergies, current medications, past family history, past medical history, past social history and past surgical history.    Past Medical History:   Diagnosis Date   • Arthritis    • Asthma (CMD)    • Depression       ALLERGIES:  Padmini-kit bee sting, Bee sting, and Grapes [grape   (food)]    Past Surgical History:   Procedure Laterality Date   • Appendectomy  01/01/1982   • Scaphoid fracture surgery Left 04/10/2023    Open reduction internal fixation scaphoid fracture left   • Total abdom hysterectomy  11/01/2011    KARLI mirza BSO     Family History   Problem Relation Age of Onset   • Cancer Mother    • Congestive Heart Failure Father    • Osteoarthritis Father         RA   • Macular degeneration Father    • Hypertension Father    • Hyperlipidemia Father    • Cancer Maternal Grandmother    • Cancer Paternal Grandmother    • Heart disease Maternal Grandfather    • Heart disease Paternal Grandfather      Social History     Tobacco Use   • Smoking status: Never   • Smokeless tobacco: Never   Vaping Use   • Vaping status: never used   Substance Use Topics   • Alcohol use: Yes     Comment: 1 per month   • Drug use: No     MEDICATIONS:    Current Outpatient Medications   Medication Sig Dispense Refill   •  Patient: Wesley Fields  Location: [address] 2023  MRN: [id number]  Today's date: 3/31/2019    Attempted to see patient for therapy. Unable due to medical hold (ECMO, but also O2 low).     phenazopyridine (Pyridium) 200 MG tablet Take 1 tablet by mouth 3 times daily as needed for Pain. 6 tablet 0   • cefdinir (OMNICEF) 300 MG capsule Take 1 capsule by mouth in the morning and 1 capsule in the evening. Do all this for 5 days. 10 capsule 0   • celecoxib (CeleBREX) 100 MG capsule Take 100 mg by mouth 2 times daily as needed.     • Ferrous Sulfate (IRON PO) Take 1 tablet by mouth daily.     • ibuprofen (MOTRIN) 800 MG tablet Take 800 mg by mouth.     • ondansetron (ZOFRAN ODT) 4 MG disintegrating tablet DISSOLVE ONE TABLET ON TOP OF TONGUE THREE TIMES DAILY AS NEEDED FOR  NAUSEA AND VOMITING     • EPINEPHrine 0.3 MG/0.3ML auto-injector Inject 0.3 mg into the muscle.     • montelukast (SINGULAIR) 10 MG tablet      • omeprazole (PriLOSEC) 10 MG capsule Take 10 mg by mouth.     • albuterol 108 (90 Base) MCG/ACT inhaler Inhale 2 puffs into the lungs every 4 hours as needed for Wheezing. 8.5 g 0   • benzonatate (TESSALON PERLES) 200 MG capsule Take 1 capsule by mouth 3 times daily as needed for Cough. 20 capsule 0   • albuterol (VENTOLIN) (2.5 MG/3ML) 0.083% nebulizer solution Take 3 mLs by nebulization every 6 hours as needed for Shortness of Breath. 75 mL 0   • Semaglutide-Weight Management (Wegovy) 0.5 MG/0.5ML Solution Auto-injector Inject 0.5 mg into the skin 1 day a week. 2 mL 0   • metFORMIN (GLUCOPHAGE-XR) 750 MG 24 hr tablet Take 2 tablets (1,500 mg) by mouth daily with evening meal (Patient not taking: Reported on 7/31/2024) 180 tablet 0   • cetirizine (ZyrTEC) 10 MG tablet Take 10 mg by mouth daily.     • VITAMIN D, CHOLECALCIFEROL, PO Take 1 tablet by mouth daily.     • omeprazole (PrilOSEC) 20 MG capsule Take 20 mg by mouth daily.     • montelukast (Singulair) 10 MG tablet Take 10 mg by mouth nightly.     • EPINEPHrine (EpiPen) 0.3 MG/0.3ML Device auto-injector Inject  into the muscle as needed.     • melatonin 3 MG TABS Take 3 mg by mouth nightly as needed.     • Multiple Vitamin (MULTIVITAMIN)  capsule Take 1 capsule by mouth daily.       No current facility-administered medications for this visit.       REVIEW OF SYSTEMS:  Review of Systems   Constitutional:  Negative for chills, fatigue, fever and unexpected weight change.   HENT:  Negative for ear pain, facial swelling and sore throat.    Respiratory:  Negative for cough, shortness of breath and wheezing.    Cardiovascular:  Negative for chest pain, palpitations and leg swelling.   Gastrointestinal:  Negative for abdominal pain, blood in stool, constipation, diarrhea, nausea and vomiting.   Endocrine: Negative for polyuria.   Genitourinary:  Positive for dysuria, flank pain, frequency and urgency. Negative for decreased urine volume, difficulty urinating, hematuria, pelvic pain, vaginal bleeding and vaginal discharge.   Musculoskeletal:  Negative for arthralgias, myalgias, neck pain and neck stiffness.   Skin:  Negative for color change and rash.   Neurological:  Negative for dizziness, syncope, weakness, numbness and headaches.   Hematological:  Does not bruise/bleed easily.   Psychiatric/Behavioral:  Negative for agitation and confusion.       PHYSICAL EXAM:  Visit Vitals  /84 (BP Location: LUE - Left upper extremity, Patient Position: Sitting, Cuff Size: Regular)   Pulse 66   Temp 98.7 °F (37.1 °C) (Tympanic)   Resp 18   SpO2 97%       URGENT CARE COURSE:    Results for orders placed or performed in visit on 07/31/24   POCT Urine Dip Auto   Result Value    POCT Color Yellow    POCT Appearance Clear    POCT Glucose Urine Negative    POCT Bilirubin Negative    POCT Ketones Negative    POCT Specific Gravity 1.020    POCT Occult Blood Trace (A)    POCT pH 5.5    POCT Protein Negative    POCT Urobilinogen 0.2    Urine Nitrite Positive (A)    WBC (Leukocyte) Esterase POC Trace (A)     MDM: This patient presents with signs and symptoms of a urinary tract infection.  Although she reports some flank pain this pain does not sound consistent with kidney  stone.  Her urine dip here is consistent with an acute UTI.  I reviewed her epic record looking for previous positive urine cultures and was unable to find any.  I will prescribe her cefdinir 1 tablet twice a day for 5 days.  I will also prescribe for her Pyridium for the dysuria.  She was warned that it would make her urine a dark yellow or orange color and can stain soft contacts.  Please see AVS for discharge instructions.    DIAGNOSIS:  1.  (genitourinary) symptoms    2. Acute UTI      New Prescriptions    CEFDINIR (OMNICEF) 300 MG CAPSULE    Take 1 capsule by mouth in the morning and 1 capsule in the evening. Do all this for 5 days.    PHENAZOPYRIDINE (PYRIDIUM) 200 MG TABLET    Take 1 tablet by mouth 3 times daily as needed for Pain.     Verbal and written instructions were provided to the patient and they were reviewed in detail. All of the patient's questions were answered. They were given strict instructions on when to present to a hospital based emergency room.     The likelihood of other entities in the differential is insufficient to justify any further testing for them. This was explained to the patient. The patient was advised that persistent or worsening symptoms could require further evaluation. Patient acknowledged shared decision making at the end of our conversation.    Total time I spent today on this appointment is 32 minutes.

## 2024-10-16 ENCOUNTER — TELEPHONE (OUTPATIENT)
Dept: NEUROSURGERY | Facility: CLINIC | Age: 64
End: 2024-10-16

## 2024-10-16 ENCOUNTER — TELEPHONE (OUTPATIENT)
Age: 64
End: 2024-10-16

## 2024-10-16 NOTE — TELEPHONE ENCOUNTER
Rec'd Reports Consults, MRI of the Brain from 2/8/24, CT of Chest, Ultrasound of abdomen, etc records from pts doctor Dr Henok Bhardwaj ? Dr Abilio Esquivel, will send to [facility] now to scan into chart. Made referral to have pt seen by us.

## 2024-10-16 NOTE — TELEPHONE ENCOUNTER
Pts wife called in to schedule appt for Pt to see Dr. Munoz. I explained the intake process and provided our fax number and address for medical records and disks to be sent to us.     Pt and wife will call back to do intake once all records are received.

## 2024-10-17 ENCOUNTER — TELEPHONE (OUTPATIENT)
Age: 64
End: 2024-10-17

## 2024-10-17 NOTE — TELEPHONE ENCOUNTER
Pt faxs rec'd and scanned. [facility] [phone] to confirm receipt and will fax additional office notes and referral form to us .

## 2024-10-17 NOTE — TELEPHONE ENCOUNTER
Pt's wife returned call and intake completed and sent to Dr. Munoz for review as records have been received from ref provider @ [facility]-Dr. Abilio Choi.  They will be mailing out imaging discs today for upload ASAP.

## 2024-10-18 ENCOUNTER — TELEPHONE (OUTPATIENT)
Dept: NEUROSURGERY | Facility: CLINIC | Age: 64
End: 2024-10-18

## 2024-10-18 NOTE — TELEPHONE ENCOUNTER
Rec'd by navi pts MRI's of 10/9/24, 02/08/24, 8/02/2023 and 01/30/2023, gave to Jose Carlos to upload disk today

## 2024-10-31 ENCOUNTER — CONSULT (OUTPATIENT)
Dept: NEUROSURGERY | Facility: CLINIC | Age: 64
End: 2024-10-31
Payer: COMMERCIAL

## 2024-10-31 VITALS
HEART RATE: 78 BPM | OXYGEN SATURATION: 97 % | BODY MASS INDEX: 34.54 KG/M2 | DIASTOLIC BLOOD PRESSURE: 84 MMHG | HEIGHT: 72 IN | WEIGHT: 255 LBS | SYSTOLIC BLOOD PRESSURE: 126 MMHG

## 2024-10-31 DIAGNOSIS — C34.90 LUNG CANCER (HCC): Primary | ICD-10-CM

## 2024-10-31 PROCEDURE — 99204 OFFICE O/P NEW MOD 45 MIN: CPT | Performed by: NEUROLOGICAL SURGERY

## 2024-10-31 RX ORDER — METOPROLOL SUCCINATE 50 MG/1
TABLET, EXTENDED RELEASE ORAL
COMMUNITY

## 2024-10-31 RX ORDER — METOPROLOL SUCCINATE 25 MG/1
50 TABLET, EXTENDED RELEASE ORAL DAILY
COMMUNITY

## 2024-10-31 RX ORDER — LISINOPRIL 40 MG/1
40 TABLET ORAL EVERY MORNING
COMMUNITY

## 2024-10-31 RX ORDER — POTASSIUM CHLORIDE 1500 MG/1
20 TABLET, EXTENDED RELEASE ORAL 2 TIMES DAILY
COMMUNITY
Start: 2024-08-30

## 2024-10-31 RX ORDER — SEMAGLUTIDE 0.68 MG/ML
INJECTION, SOLUTION SUBCUTANEOUS
COMMUNITY
Start: 2024-08-24

## 2024-10-31 RX ORDER — OMEGA-3-ACID ETHYL ESTERS 1 G/1
CAPSULE, LIQUID FILLED ORAL
COMMUNITY
Start: 2024-10-29

## 2024-10-31 RX ORDER — ROSUVASTATIN CALCIUM 20 MG/1
20 TABLET, COATED ORAL DAILY
COMMUNITY

## 2024-10-31 RX ORDER — AMLODIPINE BESYLATE 5 MG/1
5 TABLET ORAL DAILY
COMMUNITY
Start: 2024-08-30

## 2024-10-31 RX ORDER — ALLOPURINOL 300 MG/1
300 TABLET ORAL DAILY
COMMUNITY

## 2024-10-31 RX ORDER — TORSEMIDE 10 MG/1
TABLET ORAL
COMMUNITY
Start: 2024-08-30

## 2024-10-31 RX ORDER — ASPIRIN 81 MG/1
81 TABLET ORAL
COMMUNITY

## 2024-10-31 NOTE — PROGRESS NOTES
Ambulatory Visit  Name: Ronal Miguel      : 1960      MRN: [id number]  Encounter Provider: Samuel Kenyon MD  Encounter Date: 10/31/2024   Encounter department: [facility] NEUROSURGICAL [facility]    Assessment & Plan      History of Present Illness       Patient is a 64-year-old male with h/o metastatic non-small cell lung cancer diagnosed in  s/p multiple rounds of chemotherapy, remains on third line agent for ~6 years, also with known brain metastases s/p CyberKnife to right frontal lesion in  and posterior skull RT in . No RT to other sites than the brain. Followed by oncologist near [place], no longer following with radiation oncologist.    Taking ASA 81 mg daily. Non-smoker.    Today, he denies any headache or any neurologic symptoms or deficits. He was previously treated with 6 weeks of Decadron in 2024 for increased cerebral edema; however, he did not tolerate the significant side effects including severe mood swings, weight gain, insomnia.    Multiple MRI scans ([id number] images available for viewing), most recently on 10/9/24, and reports (available since  per documentation) showed progressive increase in vasogenic edema surrounding the known, previously treated, right frontal metastasis.    He is scheduled for a staging PET scan next week.     At this time, I will order repeat short-interval MRI brain BT protocol in 4-6 weeks and place referral to Radiation Oncology.    No clinical indication to resume steroids at this time given he remains asymptomatic.    All questions and concerns were addressed during this visit.      HPI  Review of Systems   Constitutional: Negative.    HENT: Negative.     Eyes: Negative.    Respiratory: Negative.     Cardiovascular: Negative.    Gastrointestinal: Negative.    Endocrine: Negative.    Genitourinary: Negative.    Musculoskeletal: Negative.    Skin: Negative.    Allergic/Immunologic: Negative.    Neurological: Negative.           Rm 11 Ronal is w/ Felicia (wife)    Brain Edema, possible Brain mets  MRI Brain on 10/9/24  CyberKnife-2018 and skull radiation [id number]   On Aspirin/ non-smoker   Hematological: Negative.    Psychiatric/Behavioral: Negative.     All other systems reviewed and are negative.    I have personally reviewed the MA's review of systems and made changes as necessary.    /84   Pulse 78   Ht 6' (1.829 m)   Wt 116 kg (255 lb)   SpO2 97%   BMI 34.58 kg/m²     Physical Exam  Vitals and nursing note reviewed.   Constitutional:       Appearance: Normal appearance. He is normal weight.   HENT:      Head: Normocephalic and atraumatic.   Eyes:      Extraocular Movements: Extraocular movements intact.      Pupils: Pupils are equal, round, and reactive to light.   Cardiovascular:      Rate and Rhythm: Normal rate and regular rhythm.      Pulses: Normal pulses.      Heart sounds: Normal heart sounds.   Pulmonary:      Effort: Pulmonary effort is normal.      Breath sounds: Normal breath sounds.   Abdominal:      General: Abdomen is flat.      Palpations: Abdomen is soft.   Musculoskeletal:         General: Normal range of motion.      Cervical back: Normal range of motion.   Neurological:      General: No focal deficit present.      Mental Status: He is alert and oriented to person, place, and time. Mental status is at baseline.      GCS: GCS eye subscore is 4. GCS verbal subscore is 5. GCS motor subscore is 6.      Cranial Nerves: Cranial nerves 2-12 are intact.      Sensory: Sensation is intact.      Motor: Motor strength is normal.Motor function is intact.      Coordination: Coordination is intact.      Gait: Gait is intact.      Deep Tendon Reflexes: Reflexes are normal and symmetric.   Psychiatric:         Mood and Affect: Mood normal.         Speech: Speech normal.         Behavior: Behavior normal.       Neurologic Exam     Mental Status   Oriented to person, place, and time.   Attention: normal. Concentration: normal.    Speech: speech is normal   Level of consciousness: alert    Cranial Nerves   Cranial nerves II through XII intact.     CN III, IV, VI   Pupils are equal, round, and reactive to light.    Motor Exam     Strength   Strength 5/5 throughout.     Sensory Exam   Light touch normal.     Gait, Coordination, and Reflexes     Gait  Gait: normal    Reflexes   Reflexes 2+ except as noted.       I personally reviewed the following image studies in PACS and associated radiology reports: MRI brain. My interpretation of the radiology images/reports is: See above.    Administrative Statements   I have spent a total time of 30 minutes in caring for this patient on the day of the visit/encounter including Diagnostic results, Prognosis, Risks and benefits of tx options, Instructions for management, Patient and family education, Importance of tx compliance, Risk factor reductions, Impressions, Counseling / Coordination of care, Documenting in the medical record, Reviewing / ordering tests, medicine, procedures  , Obtaining or reviewing history  , and Communicating with other healthcare professionals .

## 2024-10-31 NOTE — PROGRESS NOTES
Ambulatory Visit  Name: Ronal Miguel      : 1960      MRN: [id number]  Encounter Provider: Samuel Kenyon MD  Encounter Date: 10/31/2024   Encounter department: [facility]    Assessment & Plan      History of Present Illness {?Quick Links Encounters * My Last Note * Last Note in Specialty * Snapshot * Since Last Visit * History :05421}  HPI  Review of Systems  I have personally reviewed the MA's review of systems and made changes as necessary.    {Select to Display PM (Optional):40410}  Objective   {?Quick Links Trend Vitals * Enter New Vitals * Results Review * Timeline (Adult) * Labs * Imaging * Cardiology * Procedures * Lung Cancer Screening * Surgical eConsent :92702}  There were no vitals taken for this visit.    Physical Exam  Neurologic Exam    { AMB Radiology Results Review (Optional):85258}    {Administrative / Billing Section (Optional):59670}

## 2024-11-05 ENCOUNTER — PATIENT OUTREACH (OUTPATIENT)
Dept: HEMATOLOGY ONCOLOGY | Facility: CLINIC | Age: 64
End: 2024-11-05

## 2024-11-05 ENCOUNTER — DOCUMENTATION (OUTPATIENT)
Dept: HEMATOLOGY ONCOLOGY | Facility: CLINIC | Age: 64
End: 2024-11-05

## 2024-11-05 NOTE — PROGRESS NOTES
Initial outreach. Spoke to patient's spouse, Natalie. Introduced myself and explained the role of an Oncology Nurse Navigator to assist with coordination of cancer care, preparation for upcoming appointment, be a point of contact prior to oncology consult, provide support and connect with available resources. Discussed radiation oncology referral placed by Dr Kenyon for history of metastatic NSCLC with brain metastases. Explained I will be their main contact until they are established with their team and a treatment plan is established.     Patient with history of metastatic lung cancer since 2013. He received palliative radiation therapy to posterior skull in Jan 2014 by Dr Rivera Turner at [facility] in [place], [place]. He underwent [facility]ife in Feb 2018 [facility] Dr Jaime López.  He has been followed by Dr Abilio Esquivel at [facility] [place], [place] (near [place]) for 11 years. He has been on Lorlatinib since 2014. Patient is scheduled for PET CT scan at [facility] on 11/7/24. He is scheduled for MRI brain on 12/2/24 at [facility]. All prior MRI's were done at [facility]. Natalie informed that per Dr Kenyon, patient can be seen by radiation oncology after his 12/2/24 MRI of the brain.      Introduced Ritika, and explained she will be his patient navigator, who will reach out after the first consult to introduce themselves. The PN will provide support, make sure they have a good understanding of the plan and schedule and to connect with additional resources if/when needed.     Reviewed office locations. Informed that [facility] scheduling will be calling to schedule the radiation consult. Assessed for barriers of care. My direct contact information provided. Natalie is aware that they can call me should they need any further assistance. She was appreciative of assistance.       Neuro Symptoms:  (completed with spouse, Natalie)  Headache No  Dizziness No  Lightheadedness No  Confusion/Memory changes No  Nausea/Vomiting No  Seizures/Tremors No  Vision changes No  Speech changes No  Numbness/Tingling No  Weakness No  Difficulty walking/falls No  Fatigue no    PCP: Dr Mathieu Robert  Insurance: [employer] -> switching to [employer] on 12/1/24.

## 2024-11-05 NOTE — PROGRESS NOTES
PN please retrieve outside records.     Referral received/ Chart reviewed for work up completed     Imaging completed:    [x] PET/CT scheduled for 11/7/24 at [facility] - need PET CT report and imaging pushed to [facility]   [] MRI   [] CT   [] US   [] Mammo   [] Bone scan   [] N/A    Pathology completed: Pathology report in Care Everywhere   Date: 12/13/2013   Location: [facility] [place]   [x]N/A    Additional records needed:   [] Genomic report   [] Genetic testing results   [] Office Note   [] Procedure/ Operative note   [] Lab results   [x] N/A      [x] Radiation Oncology records retrieval needed (PN to route to rad/onc clerical pool once scheduled)  Date: Jan 2014  Location: Palliative radiation therapy to posterior skull in  by Dr Rivera Turner at [facility] in [place], [place].    Date: Feb 2018  Location: Cyberknife at [facility] [place] Cyberknife with Dr Jaime López.

## 2024-11-06 ENCOUNTER — PATIENT OUTREACH (OUTPATIENT)
Dept: HEMATOLOGY ONCOLOGY | Facility: CLINIC | Age: 64
End: 2024-11-06

## 2024-11-06 ENCOUNTER — DOCUMENTATION (OUTPATIENT)
Dept: HEMATOLOGY ONCOLOGY | Facility: CLINIC | Age: 64
End: 2024-11-06

## 2024-11-06 NOTE — PROGRESS NOTES
Intake received, chart reviewed for need of external records.  Consulting: Dr. Luke Shah  Scheduled on 12/11/24  Dx: Lung Cancer   ICD: C34.90        Images requested:  From [facility] phone [phone], via fax [phone]  If not uploaded electronically via [employer], disks will be sent directly to the Radiology Reading Room.

## 2024-11-06 NOTE — PROGRESS NOTES
Received call from Kandace at [facility] confirming she received my fax and will be sending out the images today.

## 2024-11-11 ENCOUNTER — TELEPHONE (OUTPATIENT)
Dept: NEUROSURGERY | Facility: CLINIC | Age: 64
End: 2024-11-11

## 2024-11-11 NOTE — TELEPHONE ENCOUNTER
Call received from patient's spouse stating they have a huge family trip coming up and she questioned if everything were to stay the same with patient's condition would it be safe for him to fly. Advised at this point the plan for Ronal is surveillance. Currently there would be no neurosurgical contraindications to him flying.     She stated an understanding and was appreciative of the call.

## 2024-11-13 ENCOUNTER — PATIENT OUTREACH (OUTPATIENT)
Dept: HEMATOLOGY ONCOLOGY | Facility: CLINIC | Age: 64
End: 2024-11-13

## 2024-11-13 ENCOUNTER — DOCUMENTATION (OUTPATIENT)
Dept: HEMATOLOGY ONCOLOGY | Facility: CLINIC | Age: 64
End: 2024-11-13

## 2024-11-13 NOTE — PROGRESS NOTES
Intake received, chart reviewed for need of external records.  Consulting: Dr. Luke Shah  Scheduled on 12/11/24  Dx: Lung Cancer   ICD: C34.90      Images requested:  From [facility] phone [phone], via fax [phone]  If not uploaded electronically via [employer], disks will be sent directly to the Radiology Reading Room.

## 2024-11-25 ENCOUNTER — PATIENT OUTREACH (OUTPATIENT)
Dept: HEMATOLOGY ONCOLOGY | Facility: CLINIC | Age: 64
End: 2024-11-25

## 2024-12-02 ENCOUNTER — HOSPITAL ENCOUNTER (OUTPATIENT)
Dept: MRI IMAGING | Facility: HOSPITAL | Age: 64
Discharge: HOME | End: 2024-12-02
Attending: NEUROLOGICAL SURGERY
Payer: COMMERCIAL

## 2024-12-02 DIAGNOSIS — C34.90 LUNG CANCER (HCC): ICD-10-CM

## 2024-12-02 PROCEDURE — 70553 MRI BRAIN STEM W/O & W/DYE: CPT

## 2024-12-02 PROCEDURE — A9585 GADOBUTROL INJECTION: HCPCS | Performed by: NEUROLOGICAL SURGERY

## 2024-12-02 RX ORDER — GADOBUTROL 604.72 MG/ML
11 INJECTION INTRAVENOUS
Status: COMPLETED | OUTPATIENT
Start: 2024-12-02 | End: 2024-12-02

## 2024-12-02 RX ADMIN — GADOBUTROL 11 ML: 604.72 INJECTION INTRAVENOUS at 23:45

## 2024-12-06 ENCOUNTER — RESULTS FOLLOW-UP (OUTPATIENT)
Dept: NEUROSURGERY | Facility: CLINIC | Age: 64
End: 2024-12-06

## 2024-12-11 ENCOUNTER — DOCUMENTATION (OUTPATIENT)
Dept: HEMATOLOGY ONCOLOGY | Facility: CLINIC | Age: 64
End: 2024-12-11

## 2024-12-11 ENCOUNTER — CONSULT (OUTPATIENT)
Dept: RADIATION ONCOLOGY | Facility: HOSPITAL | Age: 64
End: 2024-12-11
Attending: NEUROLOGICAL SURGERY
Payer: COMMERCIAL

## 2024-12-11 ENCOUNTER — OFFICE VISIT (OUTPATIENT)
Dept: NEUROSURGERY | Facility: CLINIC | Age: 64
End: 2024-12-11
Payer: COMMERCIAL

## 2024-12-11 VITALS
OXYGEN SATURATION: 96 % | WEIGHT: 255 LBS | HEIGHT: 72 IN | TEMPERATURE: 98.1 F | BODY MASS INDEX: 34.54 KG/M2 | DIASTOLIC BLOOD PRESSURE: 78 MMHG | HEART RATE: 70 BPM | SYSTOLIC BLOOD PRESSURE: 123 MMHG

## 2024-12-11 VITALS
SYSTOLIC BLOOD PRESSURE: 120 MMHG | OXYGEN SATURATION: 98 % | WEIGHT: 256 LBS | RESPIRATION RATE: 14 BRPM | BODY MASS INDEX: 34.72 KG/M2 | DIASTOLIC BLOOD PRESSURE: 80 MMHG | HEART RATE: 77 BPM | TEMPERATURE: 97.8 F

## 2024-12-11 DIAGNOSIS — C79.31 METASTASIS TO BRAIN (HCC): ICD-10-CM

## 2024-12-11 DIAGNOSIS — C34.90 METASTATIC NON-SMALL CELL LUNG CANCER (HCC): Primary | ICD-10-CM

## 2024-12-11 DIAGNOSIS — C34.90 LUNG CANCER (HCC): Primary | ICD-10-CM

## 2024-12-11 DIAGNOSIS — C34.90 LUNG CANCER (HCC): ICD-10-CM

## 2024-12-11 PROCEDURE — 99213 OFFICE O/P EST LOW 20 MIN: CPT | Performed by: NEUROLOGICAL SURGERY

## 2024-12-11 PROCEDURE — 99211 OFF/OP EST MAY X REQ PHY/QHP: CPT | Performed by: STUDENT IN AN ORGANIZED HEALTH CARE EDUCATION/TRAINING PROGRAM

## 2024-12-11 PROCEDURE — 99205 OFFICE O/P NEW HI 60 MIN: CPT | Performed by: STUDENT IN AN ORGANIZED HEALTH CARE EDUCATION/TRAINING PROGRAM

## 2024-12-11 RX ORDER — AMOXICILLIN AND CLAVULANATE POTASSIUM 500; 125 MG/1; MG/1
TABLET, FILM COATED ORAL
COMMUNITY
Start: 2024-12-05

## 2024-12-11 NOTE — PROGRESS NOTES
Ronal Miguel 1960 is a 64 y.o. maleReferred by Dr. Kenyon to discuss SRS for metastatic NSCLC with brain metastasis.     with history of metastatic lung cancer since 2013. He received palliative radiation therapy to posterior skull in Jan 2014 by Dr Rivera Turner at [facility] in [place], [place]. He underwent [facility] with Dr Jaime López.  He has been followed by Dr Ab[facility] Esquivel [facility] [place], [place] (near [place]) for 11 years. He has been on Lorlatinib (2018) 3rd generation, since 2014. Patient is scheduled for PET CT scan at [facility] on 11/7/24. He is scheduled for MRI brain on 12/2/24 at [facility]. All prior MRI's were done at [facility].       10/9/24 MRI brain ([facility])  IMPRESSION:  Interval increase in the size of the right frontal lesion with increased surrounding vasogenic edema.   Left posterior parietal 5 mm altered signal intensity lesion appearing hypointense on T2 weighted images along the superficial cortex, with nodular enhancement and showing blooming on SWI images. No significant surrounding edema was noted.   Mild interval increase in size since the prior study.  An 8 mm focus of SWI blooming in the right corona radiata along the periventricular margin. No surrounding edema.   Mild chronic microvascular ischemic changes with senile cortical atrophy, in a known case of lung carcinoma, findings in MR brain represent hemorrhagic metastasis likely as detailed below.       10/31/24 Neurosurgery, Dr. Kenyon  h/o metastatic non-small cell lung cancer diagnosed in 2013 s/p multiple rounds of chemotherapy, remains on third line agent for ~6 years, also with known brain metastases s/p CyberKnife to right frontal lesion in 2018 and posterior skull RT in 2013. No RT to other sites than the brain. Followed by oncologist near [place],  no longer following with radiation oncologist.   Multiple MRI scans ([id number] images available for viewing), most recently on 10/9/24, and reports (available since 2016 per documentation) showed progressive increase in vasogenic edema surrounding the known, previously treated, right frontal metastasis.   He is scheduled for a staging PET scan next week.    At this time, will order repeat short-interval MRI brain BT protocol in 4-6 weeks and place referral to Radiation Oncology.        12/2/24 MRI brain w wo contrast  1. Slight increase in size of heterogeneously enhancing hemorrhagic metastasis in the right frontal lobe with similar underlying mass effect/vasogenic edema.   2. Stable 4 mm enhancing lesion in the left parietal lobe without significant mass effect.   3. No acute infarct.      Upcoming appts:  12/11/24 Dr. Kenyon 3 pm    Oncology History Overview Note   Referred by Dr. Kenyon to discuss SRS for metastatic NSCLC with brain metastasis.     with history of metastatic lung cancer since 2013. He received palliative radiation therapy to posterior skull in Jan 2014 by Dr Rivera Turner at [facility] in [place], [place]. He [facility] with Dr Jaime López.  He has been followed by Dr Abilio Esquivel at [facility] [place], [place] (near [place]) for 11 years. He has been on Lorlatinib since 2014. Patient is scheduled for PET CT scan at [facility] on 11/7/24. He is scheduled for MRI brain on 12/2/24 at [facility]. All prior MRI's were done at [facility].       10/9/24 MRI brain ([facility])  IMPRESSION:  Interval increase in the size of the right frontal lesion with increased surrounding vasogenic edema.   Left posterior parietal 5 mm altered signal intensity lesion appearing hypointense on T2 weighted images along the superficial cortex, with nodular  enhancement and showing blooming on SWI images. No significant surrounding edema was noted.   Mild interval increase in size since the prior study.  An 8 mm focus of SWI blooming in the right corona radiata along the periventricular margin. No surrounding edema.   Mild chronic microvascular ischemic changes with senile cortical atrophy, in a known case of lung carcinoma, findings in MR brain represent hemorrhagic metastasis likely as detailed below.       10/31/24 Neurosurgery, Dr. Kenyon  h/o metastatic non-small cell lung cancer diagnosed in 2013 s/p multiple rounds of chemotherapy, remains on third line agent for ~6 years, also with known brain metastases s/p CyberKnife to right frontal lesion in 2018 and posterior skull RT in 2013. No RT to other sites than the brain. Followed by oncologist near [place], no longer following with radiation oncologist.   Multiple MRI scans ([id number] images available for viewing), most recently on 10/9/24, and reports (available since 2016 per documentation) showed progressive increase in vasogenic edema surrounding the known, previously treated, right frontal metastasis.   He is scheduled for a staging PET scan next week.    At this time, will order repeat short-interval MRI brain BT protocol in 4-6 weeks and place referral to Radiation Oncology.        12/2/24 MRI brain w wo contrast  1. Slight increase in size of heterogeneously enhancing hemorrhagic metastasis in the right frontal lobe with similar underlying mass effect/vasogenic edema.   2. Stable 4 mm enhancing lesion in the left parietal lobe without significant mass effect.   3. No acute infarct.      Upcoming appts:  12/11/24 Dr. Kenyon 3 pm     Metastatic non-small cell lung cancer (HCC)    Initial Diagnosis    Metastatic non-small cell lung cancer (HCC)     12/18/2013 - 1/2/2014 Radiation    Right parietal RT 2850 cGy    [facility]     12/23/2013 - 1/13/2014 Chemotherapy    Premetrexed and  Carboplatin (Dr. Esquivel)     2/2018 - 2/2018 Radiation    Cyberknife, right frontal metastasis           Review of Systems:  Review of Systems   Constitutional:  Positive for fatigue.   HENT:  Positive for congestion.         Dry mouth at night   Eyes:  Positive for photophobia (inconsistently).        Reports foggy vision at times   Respiratory: Negative.     Cardiovascular: Negative.    Gastrointestinal:  Positive for diarrhea.   Endocrine: Negative.    Genitourinary: Negative.    Musculoskeletal: Negative.    Skin: Negative.    Neurological:  Positive for headaches (mild, intermittent.).   Hematological: Negative.    Psychiatric/Behavioral:  Positive for confusion (possibly some intermittent confusion/memory loss.).        Clinical Trial: no    IPSS Questionnaire (AUA-7):      Pain assessment: 0    PSA    PFT    Prior Radiation yes    Teaching yes    MST no    Implantable Devices (Port, pacemaker, pain stimulator)mitral valve ring    Hip Replacement no    Health Maintenance   Topic Date Due    Hepatitis C Screening  Never done    Pneumococcal Vaccine: Pediatrics (0 to 5 Years) and At-Risk Patients (6 to 64 Years) (1 of 2 - PCV) Never done    Depression Screening  Never done    HIV Screening  Never done    BMI: Followup Plan  Never done    Annual Physical  Never done    Zoster Vaccine (1 of 2) Never done    DTaP,Tdap,and Td Vaccines (1 - Tdap) Never done    RSV Vaccine Age 60+ Years (1 - Risk 60-74 years 1-dose series) Never done    Influenza Vaccine (1) 09/01/2024    COVID-19 Vaccine (4 - 2024-25 season) 09/01/2024    BMI: Adult  10/31/2025    Colorectal Cancer Screening  11/29/2025    RSV Vaccine age 0-20 Months  Aged Out    HIB Vaccine  Aged Out    IPV Vaccine  Aged Out    Hepatitis A Vaccine  Aged Out    Meningococcal ACWY Vaccine  Aged Out    HPV Vaccine  Aged Out       Past Medical History:   Diagnosis Date    Cancer (HCC) 10/2013    Coronary artery disease 2018    Diabetes mellitus (HCC) 2023     Hypertension 2010    Not sire of danya       Past Surgical History:   Procedure Laterality Date    CHOLECYSTECTOMY  04/2024    [facility]- Dr Ferrera    MITRAL VALVE REPAIR  2019       Family History   Problem Relation Age of Onset    Lung cancer Mother     Thyroid disease unspecified Mother     Lung cancer Father        Social History     Tobacco Use    Smoking status: Never    Smokeless tobacco: Never   Substance Use Topics    Alcohol use: Yes     Alcohol/week: 7.0 standard drinks of alcohol     Types: 7 Cans of beer per week     Comment: Recently cur back. Was probably more several months ago    Drug use: Never          Current Outpatient Medications:     allopurinol (ZYLOPRIM) 300 mg tablet, Take 300 mg by mouth daily, Disp: , Rfl:     amLODIPine (NORVASC) 5 mg tablet, Take 5 mg by mouth daily, Disp: , Rfl:     aspirin (ECOTRIN LOW STRENGTH) 81 mg EC tablet, Take 81 mg by mouth, Disp: , Rfl:     lisinopril (ZESTRIL) 40 mg tablet, Take 40 mg by mouth every morning, Disp: , Rfl:     Lorlatinib 25 MG TABS, Take 75 mg by mouth, Disp: , Rfl:     Magnesium 400 MG CAPS, Take 400 mg by mouth daily, Disp: , Rfl:     metoprolol succinate (TOPROL-XL) 25 mg 24 hr tablet, Take 50 mg by mouth daily, Disp: , Rfl:     metoprolol succinate (TOPROL-XL) 50 mg 24 hr tablet, 1 and 1/2 tabs (75mg) by mouth every day, Disp: , Rfl:     omega-3-acid ethyl esters (LOVAZA) 1 g capsule, Take 2 Capsules by mouth in the morning and 2 Capsules before bedtime., Disp: , Rfl:     Ozempic, 0.25 or 0.5 MG/DOSE, 2 MG/3ML injection pen, , Disp: , Rfl:     potassium chloride (Klor-Con M20) 20 mEq tablet, Take 20 mEq by mouth 2 (two) times a day, Disp: , Rfl:     rosuvastatin (CRESTOR) 20 MG tablet, Take 20 mg by mouth daily, Disp: , Rfl:     torsemide (DEMADEX) 10 mg tablet, take two tablets by mouth in the morning and one tablet in the evening, Disp: , Rfl:     No Known Allergies     Vitals:    12/11/24 0952   BP: 120/80   Pulse: 77   Resp: 14   Temp: 97.8  °F (36.6 °C)   SpO2: 98%   Weight: 116 kg (256 lb)       Pain Score: 0-No pain

## 2024-12-12 ENCOUNTER — PATIENT OUTREACH (OUTPATIENT)
Dept: HEMATOLOGY ONCOLOGY | Facility: CLINIC | Age: 64
End: 2024-12-12

## 2024-12-12 DIAGNOSIS — C79.31 METASTASIS TO BRAIN (HCC): Primary | ICD-10-CM

## 2024-12-12 NOTE — ASSESSMENT & PLAN NOTE
Orders:    BUN/Creatinine Ratio; Future    MRI Brain BT w wo Contrast; Future    Radiation Simulation Treatment

## 2024-12-12 NOTE — PROGRESS NOTES
I reached out and spoke with Natalie now that consults have been completed with the oncology teams to review for any barriers to care and offer supportive services as needed..    Patient declined the DT. His wife Natalie stated he has been going thru this for the past 11 years, and he said he doesn't have the fight in him anymore. She is trying to lift his spirits by bringing the grandchildren around more. She feels he will come around and want to fight.     I reviewed and updated the members assigned to the care team in [facility].     She knows the members of the care team as well as how and when to contact them with any needs.     She verbalizes managing the schedules well.     Natalie drives him to all appointments.    She states that he is eating and drinking as per usual with no unintentional weight loss.       Patient does not smoke.     She states she is well supported by family and friends.  Community support groups discussed including the Cancer Support Community of the [facility]. Patient declined information at this time.     She feels she has adequate insurance coverage and denies any financial concerns at this time.     Based on individual needs I will follow up in about 4 weeks with Natalie. I have provided my direct contact information and welcome them to contact me if needs as discussed above change. She said he wants to travel in the next few weeks before he starts treatment. I told her she can contact me anytime if she needs anything. She was appreciative for the call.

## 2024-12-12 NOTE — ASSESSMENT & PLAN NOTE
"Mr. Ronal Miguel is a 64-year-old man s/p prior right frontal SRS (2018) and posterior head RT (2013) who presents with an enlarging enhancing right frontal hemorrhagic metastasis consistent with tumor progression.     We reviewed his prior clinical history and recent imaging in detail. In particular, we reviewed his recent MRI Brains, which demonstrate some increased growth and enhancement in his previously treated right frontal metastasis. This was additionally reviewed at neuro-oncology tumor board with consensus that that this most likely represents tumor recurrence. While post treatment effect remains another possibility, given the time interval from prior SRS and the consistent increase in size/enhancement seen over the past several MRIs this is felt to be less likely.     Options at this point would include upfront resection +/- adjuvant RT (depending on pathology) vs upfront reirradiation. We discussed the advantages and disadvantages of each approach. Given his prior RT, the ill defined borders of his hemorrhagic metastasis, and the increased edema he is at higher risk for radionecrosis with treatment. Nonetheless, it is not clear that upfront resection would necessarily lower this risk, especially as he would probably need post-operative SRS/SRT anyway.     After consideration of multiple options and discussion with Dr. Kenyon, we will plan to proceed with fractionated SRT with plan for possible resection should he develop recurrence or radionecrosis in the future. The patient is aware that there is some uncertainty as to the nature of this lesion and that there may in fact be no viable tumor at present; that the determination to treat is being made solely based on MRI findings, which are inherently limited as compared to pathohistologic confirmation. As such there is some risk of \"overtreatment,\" whereby the patient would be exposed to the risks of RT without a concomitant benefit. The patient and his wife " are aware of this risk and are in agreement with proceeding despite it.     The patient will be traveling in the coming weeks and would like to proceed with treatment when he returns. I will arrange for CT simulation and MRI Brain per his schedule and plan to treat the patient thereafter.     Summary:  - Proceed with SRT to R frontal (presumed) recurrence.   - Will need to hold lorlatinib 1 week prior to SRT and through treatment.

## 2024-12-12 NOTE — PROGRESS NOTES
Consultation Visit   Name: Ronal Miguel      : 1960      MRN: [id number]  Encounter Provider: Tye Shah MD  Encounter Date: 2024   Encounter department: [facility] RADIATION ONCOLOGY  :  Assessment & Plan  Lung cancer (HCC)    Orders:    Ambulatory Referral to Radiation Oncology    Metastatic non-small cell lung cancer (HCC)    Orders:    BUN/Creatinine Ratio; Future    MRI Brain BT w wo Contrast; Future    Radiation Simulation Treatment    Metastasis to brain (HCC)  Mr. Ronal Miguel is a 64-year-old man s/p prior right frontal SRS () and posterior head RT () who presents with an enlarging enhancing right frontal hemorrhagic metastasis consistent with tumor progression.     We reviewed his prior clinical history and recent imaging in detail. In particular, we reviewed his recent MRI Brains, which demonstrate some increased growth and enhancement in his previously treated right frontal metastasis. This was additionally reviewed at neuro-oncology tumor board with consensus that that this most likely represents tumor recurrence. While post treatment effect remains another possibility, given the time interval from prior SRS and the consistent increase in size/enhancement seen over the past several MRIs this is felt to be less likely.     Options at this point would include upfront resection +/- adjuvant RT (depending on pathology) vs upfront reirradiation. We discussed the advantages and disadvantages of each approach. Given his prior RT, the ill defined borders of his hemorrhagic metastasis, and the increased edema he is at higher risk for radionecrosis with treatment. Nonetheless, it is not clear that upfront resection would necessarily lower this risk, especially as he would probably need post-operative SRS/SRT anyway.     After consideration of multiple options and discussion with Dr. Kenyon, we will plan to proceed with fractionated SRT with plan for possible  "resection should he develop recurrence or radionecrosis in the future. The patient is aware that there is some uncertainty as to the nature of this lesion and that there may in fact be no viable tumor at present; that the determination to treat is being made solely based on MRI findings, which are inherently limited as compared to pathohistologic confirmation. As such there is some risk of \"overtreatment,\" whereby the patient would be exposed to the risks of RT without a concomitant benefit. The patient and his wife are aware of this risk and are in agreement with proceeding despite it.     The patient will be traveling in the coming weeks and would like to proceed with treatment when he returns. I will arrange for CT simulation and MRI Brain per his schedule and plan to treat the patient thereafter.     Summary:  - Proceed with SRT to R frontal (presumed) recurrence.   - Will need to hold lorlatinib 1 week prior to SRT and through treatment.       History of Present Illness   Chief Complaint   Patient presents with    Brain Tumor   Pertinent Medical History   Mr. Ronal Miguel is a 64-year-old man with a long standing history of stage IV non-small cell lung cancer, initially diagnosed in 2013.  At that time he was treated with a course of palliative RT to the posterior head and since then he has been managed with multiple course of systemic therapy, most recently on lorlatinib over the past 6 years. He additionally has a history of prior SRS to a right frontal metastasis (2018) delivered at [facility]. More recently he was noted to have increased enhancement and edema of his previously treated right frontal metastasis concerning for disease recurrence. He was seen by Dr. Kenyon who referred the patient for consideration of SRS.     Currently the patient is doing well overall. He has no significant side effect from his metatasis; no headaches, no focal weakness, no numbness. He does have some history of " mild intermittent confusion/STM loss. He is accompanied today by his wife.     Oncology History   Oncology History   Metastatic non-small cell lung cancer (HCC)    Initial Diagnosis    Metastatic non-small cell lung cancer (HCC)     12/18/2013 - 1/2/2014 Radiation    Right parietal RT 2850 cGy    [facility]     12/23/2013 - 1/13/2014 Chemotherapy    Premetrexed and Carboplatin (Dr. Esquivel)     2/2018 - 2/2018 Radiation    Cyberknife, right frontal metastasis       12/12/2024 -  Cancer Staged    Staging form: Lung, AJCC 8th Edition  - Clinical: Stage IVB (pM1c) - Signed by Tye Shah MD on 12/12/2024          Review of Systems Refer to nursing note.         Objective   /80   Pulse 77   Temp 97.8 °F (36.6 °C)   Resp 14   Wt 116 kg (256 lb)   SpO2 98%   BMI 34.72 kg/m²     Pain Screening:  Pain Score: 0-No pain  ECOG ECOG Performance Status: 1 - Restricted in physically strenuous activity but ambulatory and able to carry out work of a light or sedentary nature, e.g., light house work, office work  Physical Exam   Well appearing. NAD.   No increased work of breathing.   Extremities warm and well perfused.   Alert and well oriented. No overt neurologic deficits.     Radiology Results: I have reviewed radiology images/reports described above. Radiology Results Review: I personally reviewed the following image studies in PACS and associated radiology reports: MRI brain. My interpretation of the radiology images/reports is: Enlarging right frontal metastasis consistent with recurrent disease..      Administrative Statements   I have spent a total time of 55 minutes in caring for this patient on the day of the visit/encounter including Diagnostic results, Risks and benefits of tx options, Counseling / Coordination of care, Documenting in the medical record, Reviewing / ordering tests, medicine, procedures  , Obtaining or reviewing history  , and Communicating with other  healthcare professionals .

## 2024-12-13 ENCOUNTER — PATIENT OUTREACH (OUTPATIENT)
Dept: CASE MANAGEMENT | Facility: OTHER | Age: 64
End: 2024-12-13

## 2024-12-18 ENCOUNTER — PATIENT OUTREACH (OUTPATIENT)
Dept: CASE MANAGEMENT | Facility: OTHER | Age: 64
End: 2024-12-18

## 2024-12-18 NOTE — PROGRESS NOTES
OSW received a referral from Trish Dent regarding providing support for pts spouse Natalie. OSW placed TC to her this day. OSW introduced self and role. She states that he has stated that he didn't have anymore fight left in him, however now his attitude has changed and he is agreeable to pursue radiation and has been asking her if his schedule has been established yet. She reports that he continues to work and travel. He has strong support from his family and a good friend that he speaks with weekly. He continues to take the chemo medication as well.    Natalie states that his cancer journey has been 11 years and honestly she really doesn't think about it too much because it has been such a normal part of their lives for so long. OSW educated on cancer support resources for caregivers and offered to mail them out. She was agreeable. OSW placed there [facility] newsletter along with the [facility] information in the mail this day. OSW also encouraged her to call this writer anytime she would like tot alk. She was very appreciative of the outreach.

## 2024-12-26 ENCOUNTER — TELEPHONE (OUTPATIENT)
Dept: NEUROSURGERY | Facility: CLINIC | Age: 64
End: 2024-12-26

## 2025-01-07 ENCOUNTER — TELEPHONE (OUTPATIENT)
Age: 65
End: 2025-01-07

## 2025-01-07 NOTE — TELEPHONE ENCOUNTER
Pts wife called in questioning pat that is scheduled for 1/15/25 @ 12:00 with Dr. Kenyon (First day SRT 12pm (This is tentatively scheduled as patient is being simmed 1/10/25)    Wife saw appt on [facility] and is unsure what this appt is for. They are currently in [place] and will now be back to do pts radiation until 1/20/25.   They are available for an appt any of the following days:   1/20, 1/22, 1/24, or 1/27 as pt will had radiation those days.     Please call wife back to help answer questions/confusion  Thank you.

## 2025-01-07 NOTE — TELEPHONE ENCOUNTER
Pt wife called, she noticed appt change to 1/21/25 and asked for details which I supplied. She requested transfer to Rad Onc to discuss appts with them, warm transfer completed.

## 2025-01-07 NOTE — TELEPHONE ENCOUNTER
Natalie called to reschedule RT treatments due to being in [place].     Per Natalie patient is scheduled for 1/15 and 1/17 and they will be on vacation.     Tried to transfer patient to [facility] rad onc but no answer. Advised Natalie they will return her call to reschedule treatments.     Please call Natalie at [phone].         Thanks!

## 2025-01-08 ENCOUNTER — APPOINTMENT (OUTPATIENT)
Dept: LAB | Facility: HOSPITAL | Age: 65
End: 2025-01-08
Payer: COMMERCIAL

## 2025-01-08 ENCOUNTER — TELEPHONE (OUTPATIENT)
Age: 65
End: 2025-01-08

## 2025-01-08 ENCOUNTER — DOCUMENTATION (OUTPATIENT)
Dept: OBGYN CLINIC | Facility: CLINIC | Age: 65
End: 2025-01-08

## 2025-01-08 DIAGNOSIS — C34.90 METASTATIC NON-SMALL CELL LUNG CANCER (HCC): Primary | ICD-10-CM

## 2025-01-08 DIAGNOSIS — C34.90 LUNG CANCER (HCC): ICD-10-CM

## 2025-01-08 LAB
BUN SERPL-MCNC: 15 MG/DL (ref 5–25)
CREAT SERPL-MCNC: 1.19 MG/DL (ref 0.6–1.3)
GFR SERPL CREATININE-BSD FRML MDRD: 65 ML/MIN/1.73SQ M

## 2025-01-08 PROCEDURE — 82565 ASSAY OF CREATININE: CPT

## 2025-01-08 PROCEDURE — 36415 COLL VENOUS BLD VENIPUNCTURE: CPT

## 2025-01-08 PROCEDURE — 84520 ASSAY OF UREA NITROGEN: CPT

## 2025-01-08 NOTE — TELEPHONE ENCOUNTER
Patients wife called in a asked for someone to call her back regarding the pts MRI that is coming up.  She sent a message in [employer] but she wanted to make sure that she spoke with someone today.  Please look at [employer] message then call her back.

## 2025-01-08 NOTE — TELEPHONE ENCOUNTER
Called patient's wife Natalie and they will go to the lab at [place] site today for BUN/Cr, this is needed for his MRI on fri 1/10. Orders are in epic

## 2025-01-10 ENCOUNTER — RADIATION THERAPY TREATMENT (OUTPATIENT)
Dept: RADIATION ONCOLOGY | Facility: HOSPITAL | Age: 65
End: 2025-01-10
Attending: STUDENT IN AN ORGANIZED HEALTH CARE EDUCATION/TRAINING PROGRAM
Payer: COMMERCIAL

## 2025-01-10 ENCOUNTER — HOSPITAL ENCOUNTER (OUTPATIENT)
Dept: MRI IMAGING | Facility: HOSPITAL | Age: 65
Discharge: HOME | End: 2025-01-10
Attending: STUDENT IN AN ORGANIZED HEALTH CARE EDUCATION/TRAINING PROGRAM
Payer: COMMERCIAL

## 2025-01-10 DIAGNOSIS — C34.90 METASTATIC NON-SMALL CELL LUNG CANCER (HCC): ICD-10-CM

## 2025-01-10 PROCEDURE — 77334 RADIATION TREATMENT AID(S): CPT | Performed by: STUDENT IN AN ORGANIZED HEALTH CARE EDUCATION/TRAINING PROGRAM

## 2025-01-10 PROCEDURE — 77470 SPECIAL RADIATION TREATMENT: CPT | Performed by: STUDENT IN AN ORGANIZED HEALTH CARE EDUCATION/TRAINING PROGRAM

## 2025-01-10 PROCEDURE — 70553 MRI BRAIN STEM W/O & W/DYE: CPT

## 2025-01-10 PROCEDURE — A9585 GADOBUTROL INJECTION: HCPCS | Performed by: STUDENT IN AN ORGANIZED HEALTH CARE EDUCATION/TRAINING PROGRAM

## 2025-01-10 RX ORDER — GADOBUTROL 604.72 MG/ML
10 INJECTION INTRAVENOUS
Status: COMPLETED | OUTPATIENT
Start: 2025-01-10 | End: 2025-01-10

## 2025-01-10 RX ADMIN — GADOBUTROL 10 ML: 604.72 INJECTION INTRAVENOUS at 19:34

## 2025-01-15 ENCOUNTER — TELEPHONE (OUTPATIENT)
Dept: RADIATION ONCOLOGY | Facility: HOSPITAL | Age: 65
End: 2025-01-15

## 2025-01-15 ENCOUNTER — APPOINTMENT (OUTPATIENT)
Dept: RADIATION ONCOLOGY | Facility: HOSPITAL | Age: 65
End: 2025-01-15
Payer: COMMERCIAL

## 2025-01-15 NOTE — TELEPHONE ENCOUNTER
Placed a call to the patient and his wife Natalie to review recommendation to discontinue systemic therapy 1 week prior to SRT. Per wife this has already been held as of yesterday. We did discuss the increase in size seen on MRI Brain as compared to prior study, which may be attributable to some combination of increased growth and/or hemorrhage. We will proceed with SRT as previously discussed and plan for post-treatment MRI Brain 2 months following completion.     Tye Shah MD  Dept of Radiation Oncology

## 2025-01-16 ENCOUNTER — PATIENT OUTREACH (OUTPATIENT)
Dept: HEMATOLOGY ONCOLOGY | Facility: CLINIC | Age: 65
End: 2025-01-16

## 2025-01-16 PROCEDURE — 77338 DESIGN MLC DEVICE FOR IMRT: CPT | Performed by: STUDENT IN AN ORGANIZED HEALTH CARE EDUCATION/TRAINING PROGRAM

## 2025-01-16 PROCEDURE — 77300 RADIATION THERAPY DOSE PLAN: CPT | Performed by: STUDENT IN AN ORGANIZED HEALTH CARE EDUCATION/TRAINING PROGRAM

## 2025-01-16 PROCEDURE — 77301 RADIOTHERAPY DOSE PLAN IMRT: CPT | Performed by: STUDENT IN AN ORGANIZED HEALTH CARE EDUCATION/TRAINING PROGRAM

## 2025-01-16 NOTE — PROGRESS NOTES
I reached out and spoke with Ronal&Natalie to follow up and to review for any changes in barriers to care and offer supportive services as needed.    Barriers noted previously and outcome of interventions; none.     Reviewed for any new barriers as noted below.    Are you having any side effects from your treatment?No    Are you eating and drinking normally? Yes, they are in [place] on a family trip    Have you been experiencing any uncontrolled pain related to your cancer diagnosis? No    Do you have a good support system? Yes, Ronal is enjoying his kids and grand kids on vacation as we speak.    Are you interested in any support groups? No    How are you doing with transportation to your appointments? Transportation is not an issue at this time.    Do you have any questions or concerns regarding your treatment plan? No, Natalie said Dr Shah called yesterday and put her mind at ease with his MRI results, so she was happy about that.    Do you know when your upcoming appointments are? yes  Future Appointments   Date Time Provider Department Center   1/21/2025 12:00 PM Craig Robert Goldberg, MD [facility]   1/21/2025 12:00 PM Chito Marc MD [facility]   1/23/2025  1:00 PM Josue Perla MD [facility]   1/27/2025 12:00 PM Tye Shah MD [facility]   1/27/2025 12:15 PM Tye Shah MD [facility]          Based on individual needs I will follow up with them in 4 weeks. I have provided my direct contact information and welcome them to contact me if their needs as discussed above change. They were appreciative for the call.

## 2025-01-17 ENCOUNTER — APPOINTMENT (OUTPATIENT)
Dept: RADIATION ONCOLOGY | Facility: HOSPITAL | Age: 65
End: 2025-01-17
Payer: COMMERCIAL

## 2025-01-20 ENCOUNTER — APPOINTMENT (OUTPATIENT)
Dept: RADIATION ONCOLOGY | Facility: HOSPITAL | Age: 65
End: 2025-01-20
Payer: COMMERCIAL

## 2025-01-21 ENCOUNTER — APPOINTMENT (OUTPATIENT)
Dept: RADIATION ONCOLOGY | Facility: HOSPITAL | Age: 65
End: 2025-01-21
Attending: STUDENT IN AN ORGANIZED HEALTH CARE EDUCATION/TRAINING PROGRAM
Payer: COMMERCIAL

## 2025-01-21 ENCOUNTER — PROCEDURE VISIT (OUTPATIENT)
Dept: NEUROSURGERY | Facility: CLINIC | Age: 65
End: 2025-01-21
Payer: COMMERCIAL

## 2025-01-21 DIAGNOSIS — C34.90 LUNG CANCER (HCC): Primary | ICD-10-CM

## 2025-01-21 PROCEDURE — 99213 OFFICE O/P EST LOW 20 MIN: CPT | Performed by: NEUROLOGICAL SURGERY

## 2025-01-21 PROCEDURE — 77373 STRTCTC BDY RAD THER TX DLVR: CPT | Performed by: STUDENT IN AN ORGANIZED HEALTH CARE EDUCATION/TRAINING PROGRAM

## 2025-01-21 NOTE — PROGRESS NOTES
PATIENT NAME: Ronal Miguel  : 1960  MRN: [id number]  PROCEDURE DATE: 2025    Stereotactic Radiotherapy (SRT) & Radiosurgery (SRS) Operative Note    Preop Diagnosis: non small cell lung cancer met to right frontal lobe    Postop Diagnosis: same    Procedure Details: Frameless Stereotactic Radiotherapy  & Radiosurgery for right frontal lesion    Surgeon: Ernesto Munoz MD, PhD     Assistants: none    No qualified resident was available to assist with this case.     Radiation Oncologist(s): Pablo    Estimated Blood Loss:  None           Specimens: None    Drains: None           Total IV Fluids: None              Findings: As above.    Complications:  None    Anesthesia: None      DETAILS OF PROCEDURE    The patient presented to the outpatient area of the Department of Radiation Oncology where an open faced immobilization mask was created. The patient then underwent a stereotactic head CT while immobilized in the mask. The patient was then released from the Department.     The patient’s stereotactic CT and previous stereotactic MRI scans were fused in the SRT/SRS planning software, which was used to develop the SRT & SRS plans.       The SRT prescription for the right frontal lesion called for a dose of 3000 Gray to be delivered to the PTV in 5 fractions. The PTV was created by expanding the GTV, as contoured by myself and/or Radiation Oncologist. The SRT plan utilized the mini-multileaf columnator on the TrueBeam machine at the [facility].     The radiosurgical prescription for the right frontal lesion called for a dose of 3000 Gray to be delivered to the PTV. The PTV was created by expanding the GTV, as contoured by myself and/or Radiation Oncologist. The radiosurgical plan utilized the mini-multileaf columnator on the TrueBeam machine at the [facility].      When the final treatment plan had been developed and approved, the patient returned to the Department for their SRS & first SRT treatment.       The patient was positioned on the treatment couch. The Optic Surface Monitoring System (OSMS) was used initially to align the patient. The patient was immobilized in their open faced mask. kV and then cone beam CT imaging was used to align the patient. Once the radiation oncologist, physicist and I agreed the patient was in correct position, the fields were treated sequentially without complications. The OSMS was used during the treatment to assure correct positioning of the patient, and if it detected patient motion, interrupted the treatment beam until the patient was back in alignment.      After the SRS & first SRT treatment had been delivered, the patient was recovered from the treatment room, scheduled for their remaining SRT treatments, and was discharged from the department. There was no blood loss and no specimen.      SIGNATURE: Craig Goldberg, MD  DATE: 1/21/2025   TIME: 2:36 PM

## 2025-01-22 ENCOUNTER — APPOINTMENT (OUTPATIENT)
Dept: RADIATION ONCOLOGY | Facility: HOSPITAL | Age: 65
End: 2025-01-22
Payer: COMMERCIAL

## 2025-01-23 ENCOUNTER — APPOINTMENT (OUTPATIENT)
Dept: RADIATION ONCOLOGY | Facility: HOSPITAL | Age: 65
End: 2025-01-23
Attending: STUDENT IN AN ORGANIZED HEALTH CARE EDUCATION/TRAINING PROGRAM
Payer: COMMERCIAL

## 2025-01-23 ENCOUNTER — APPOINTMENT (OUTPATIENT)
Dept: RADIATION ONCOLOGY | Facility: HOSPITAL | Age: 65
End: 2025-01-23
Payer: COMMERCIAL

## 2025-01-23 LAB
RAD ONC ARIA COURSE FIRST TREATMENT DATE: NORMAL
RAD ONC ARIA COURSE ID: NORMAL
RAD ONC ARIA COURSE INTENT: NORMAL
RAD ONC ARIA COURSE LAST TREATMENT DATE: NORMAL
RAD ONC ARIA COURSE SESSION NUMBER: 2
RAD ONC ARIA COURSE START DATE: NORMAL
RAD ONC ARIA COURSE TREATMENT ELAPSED DAYS: 2
RAD ONC ARIA PLAN FRACTIONS TREATED TO DATE: 2
RAD ONC ARIA PLAN ID: NORMAL
RAD ONC ARIA PLAN PRESCRIBED DOSE PER FRACTION: 6 GY
RAD ONC ARIA PLAN PRIMARY REFERENCE POINT: NORMAL
RAD ONC ARIA PLAN TOTAL FRACTIONS PRESCRIBED: 5
RAD ONC ARIA PLAN TOTAL PRESCRIBED DOSE: 3000 CGY
RAD ONC ARIA REFERENCE POINT DOSAGE GIVEN TO DATE: 12 GY
RAD ONC ARIA REFERENCE POINT ID: NORMAL
RAD ONC ARIA REFERENCE POINT SESSION DOSAGE GIVEN: 6 GY

## 2025-01-23 PROCEDURE — 77373 STRTCTC BDY RAD THER TX DLVR: CPT | Performed by: INTERNAL MEDICINE

## 2025-01-24 ENCOUNTER — APPOINTMENT (OUTPATIENT)
Dept: RADIATION ONCOLOGY | Facility: HOSPITAL | Age: 65
End: 2025-01-24
Payer: COMMERCIAL

## 2025-01-25 ENCOUNTER — APPOINTMENT (OUTPATIENT)
Dept: RADIATION ONCOLOGY | Facility: HOSPITAL | Age: 65
End: 2025-01-25
Attending: STUDENT IN AN ORGANIZED HEALTH CARE EDUCATION/TRAINING PROGRAM
Payer: COMMERCIAL

## 2025-01-25 LAB
RAD ONC ARIA COURSE FIRST TREATMENT DATE: NORMAL
RAD ONC ARIA COURSE ID: NORMAL
RAD ONC ARIA COURSE INTENT: NORMAL
RAD ONC ARIA COURSE LAST TREATMENT DATE: NORMAL
RAD ONC ARIA COURSE SESSION NUMBER: 3
RAD ONC ARIA COURSE START DATE: NORMAL
RAD ONC ARIA COURSE TREATMENT ELAPSED DAYS: 4
RAD ONC ARIA PLAN FRACTIONS TREATED TO DATE: 3
RAD ONC ARIA PLAN ID: NORMAL
RAD ONC ARIA PLAN PRESCRIBED DOSE PER FRACTION: 6 GY
RAD ONC ARIA PLAN PRIMARY REFERENCE POINT: NORMAL
RAD ONC ARIA PLAN TOTAL FRACTIONS PRESCRIBED: 5
RAD ONC ARIA PLAN TOTAL PRESCRIBED DOSE: 3000 CGY
RAD ONC ARIA REFERENCE POINT DOSAGE GIVEN TO DATE: 18 GY
RAD ONC ARIA REFERENCE POINT ID: NORMAL
RAD ONC ARIA REFERENCE POINT SESSION DOSAGE GIVEN: 6 GY

## 2025-01-25 PROCEDURE — 77373 STRTCTC BDY RAD THER TX DLVR: CPT | Performed by: STUDENT IN AN ORGANIZED HEALTH CARE EDUCATION/TRAINING PROGRAM

## 2025-01-27 ENCOUNTER — APPOINTMENT (OUTPATIENT)
Dept: RADIATION ONCOLOGY | Facility: HOSPITAL | Age: 65
End: 2025-01-27
Attending: STUDENT IN AN ORGANIZED HEALTH CARE EDUCATION/TRAINING PROGRAM
Payer: COMMERCIAL

## 2025-01-27 DIAGNOSIS — C79.31 METASTASIS TO BRAIN (HCC): Primary | ICD-10-CM

## 2025-01-27 LAB
RAD ONC ARIA COURSE FIRST TREATMENT DATE: NORMAL
RAD ONC ARIA COURSE ID: NORMAL
RAD ONC ARIA COURSE INTENT: NORMAL
RAD ONC ARIA COURSE LAST TREATMENT DATE: NORMAL
RAD ONC ARIA COURSE SESSION NUMBER: 4
RAD ONC ARIA COURSE START DATE: NORMAL
RAD ONC ARIA COURSE TREATMENT ELAPSED DAYS: 6
RAD ONC ARIA PLAN FRACTIONS TREATED TO DATE: 4
RAD ONC ARIA PLAN ID: NORMAL
RAD ONC ARIA PLAN PRESCRIBED DOSE PER FRACTION: 6 GY
RAD ONC ARIA PLAN PRIMARY REFERENCE POINT: NORMAL
RAD ONC ARIA PLAN TOTAL FRACTIONS PRESCRIBED: 5
RAD ONC ARIA PLAN TOTAL PRESCRIBED DOSE: 3000 CGY
RAD ONC ARIA REFERENCE POINT DOSAGE GIVEN TO DATE: 24 GY
RAD ONC ARIA REFERENCE POINT ID: NORMAL
RAD ONC ARIA REFERENCE POINT SESSION DOSAGE GIVEN: 6 GY

## 2025-01-27 PROCEDURE — 77373 STRTCTC BDY RAD THER TX DLVR: CPT | Performed by: STUDENT IN AN ORGANIZED HEALTH CARE EDUCATION/TRAINING PROGRAM

## 2025-01-28 ENCOUNTER — APPOINTMENT (OUTPATIENT)
Dept: RADIATION ONCOLOGY | Facility: HOSPITAL | Age: 65
End: 2025-01-28
Attending: STUDENT IN AN ORGANIZED HEALTH CARE EDUCATION/TRAINING PROGRAM
Payer: COMMERCIAL

## 2025-01-28 LAB
RAD ONC ARIA COURSE FIRST TREATMENT DATE: NORMAL
RAD ONC ARIA COURSE ID: NORMAL
RAD ONC ARIA COURSE INTENT: NORMAL
RAD ONC ARIA COURSE LAST TREATMENT DATE: NORMAL
RAD ONC ARIA COURSE SESSION NUMBER: 5
RAD ONC ARIA COURSE START DATE: NORMAL
RAD ONC ARIA COURSE TREATMENT ELAPSED DAYS: 7
RAD ONC ARIA PLAN FRACTIONS TREATED TO DATE: 5
RAD ONC ARIA PLAN ID: NORMAL
RAD ONC ARIA PLAN PRESCRIBED DOSE PER FRACTION: 6 GY
RAD ONC ARIA PLAN PRIMARY REFERENCE POINT: NORMAL
RAD ONC ARIA PLAN TOTAL FRACTIONS PRESCRIBED: 5
RAD ONC ARIA PLAN TOTAL PRESCRIBED DOSE: 3000 CGY
RAD ONC ARIA REFERENCE POINT DOSAGE GIVEN TO DATE: 30 GY
RAD ONC ARIA REFERENCE POINT ID: NORMAL
RAD ONC ARIA REFERENCE POINT SESSION DOSAGE GIVEN: 6 GY

## 2025-01-28 PROCEDURE — 77373 STRTCTC BDY RAD THER TX DLVR: CPT | Performed by: STUDENT IN AN ORGANIZED HEALTH CARE EDUCATION/TRAINING PROGRAM

## 2025-01-28 PROCEDURE — 77336 RADIATION PHYSICS CONSULT: CPT | Performed by: STUDENT IN AN ORGANIZED HEALTH CARE EDUCATION/TRAINING PROGRAM

## 2025-01-28 PROCEDURE — 77435 SBRT MANAGEMENT: CPT | Performed by: STUDENT IN AN ORGANIZED HEALTH CARE EDUCATION/TRAINING PROGRAM

## 2025-01-29 ENCOUNTER — APPOINTMENT (OUTPATIENT)
Dept: RADIATION ONCOLOGY | Facility: HOSPITAL | Age: 65
End: 2025-01-29
Payer: COMMERCIAL

## 2025-01-31 ENCOUNTER — APPOINTMENT (OUTPATIENT)
Dept: RADIATION ONCOLOGY | Facility: HOSPITAL | Age: 65
End: 2025-01-31
Attending: STUDENT IN AN ORGANIZED HEALTH CARE EDUCATION/TRAINING PROGRAM
Payer: COMMERCIAL

## 2025-01-31 ENCOUNTER — APPOINTMENT (OUTPATIENT)
Dept: RADIATION ONCOLOGY | Facility: HOSPITAL | Age: 65
End: 2025-01-31
Payer: COMMERCIAL

## 2025-02-14 ENCOUNTER — PATIENT OUTREACH (OUTPATIENT)
Dept: HEMATOLOGY ONCOLOGY | Facility: CLINIC | Age: 65
End: 2025-02-14

## 2025-02-14 NOTE — PROGRESS NOTES
I reached out and spoke with Felicia to follow up and to review for any new changes in barriers to care and offer supportive services as needed.       Reviewed for any new barriers as noted below.    Are you having any side effects from your treatment? t ired    Are you eating and drinking normally? yes    Have you been experiencing any uncontrolled pain related to your cancer diagnosis? No    Do you have a good support system? Yes     Are you interested in any support groups? no    How are you doing with transportation to your appointments? Wife takes him    Do you have any questions or concerns regarding your treatment plan? No    Any new financial concerns for your household or medical bills? No    Do you know when your upcoming appointments are? yes  Future Appointments   Date Time Provider Department Center   3/25/2025  3:00 PM [facility] MRI 1 [facility]   3/27/2025  9:30 AM Tye Shah MD [facility]          Based on individual needs I will follow up with them in 4 weeks. I have provided my direct contact information and welcome them to contact me if their needs as discussed above change. They were appreciative for the call.

## 2025-03-24 ENCOUNTER — TELEPHONE (OUTPATIENT)
Age: 65
End: 2025-03-24

## 2025-03-25 ENCOUNTER — HOSPITAL ENCOUNTER (OUTPATIENT)
Dept: MRI IMAGING | Facility: HOSPITAL | Age: 65
Discharge: HOME | End: 2025-03-25
Attending: STUDENT IN AN ORGANIZED HEALTH CARE EDUCATION/TRAINING PROGRAM
Payer: COMMERCIAL

## 2025-03-25 DIAGNOSIS — C79.31 METASTASIS TO BRAIN (HCC): ICD-10-CM

## 2025-03-25 PROCEDURE — 70553 MRI BRAIN STEM W/O & W/DYE: CPT

## 2025-03-25 PROCEDURE — A9585 GADOBUTROL INJECTION: HCPCS | Performed by: STUDENT IN AN ORGANIZED HEALTH CARE EDUCATION/TRAINING PROGRAM

## 2025-03-25 RX ORDER — GADOBUTROL 604.72 MG/ML
11 INJECTION INTRAVENOUS
Status: COMPLETED | OUTPATIENT
Start: 2025-03-25 | End: 2025-03-25

## 2025-03-25 RX ADMIN — GADOBUTROL 11 ML: 604.72 INJECTION INTRAVENOUS at 15:17

## 2025-03-27 ENCOUNTER — OFFICE VISIT (OUTPATIENT)
Dept: RADIATION ONCOLOGY | Facility: CLINIC | Age: 65
End: 2025-03-27
Attending: STUDENT IN AN ORGANIZED HEALTH CARE EDUCATION/TRAINING PROGRAM
Payer: COMMERCIAL

## 2025-03-27 VITALS
RESPIRATION RATE: 16 BRPM | WEIGHT: 264 LBS | TEMPERATURE: 97 F | HEART RATE: 81 BPM | OXYGEN SATURATION: 96 % | SYSTOLIC BLOOD PRESSURE: 140 MMHG | BODY MASS INDEX: 35.8 KG/M2 | DIASTOLIC BLOOD PRESSURE: 82 MMHG

## 2025-03-27 DIAGNOSIS — C79.31 METASTASIS TO BRAIN (HCC): Primary | ICD-10-CM

## 2025-03-27 PROCEDURE — 99024 POSTOP FOLLOW-UP VISIT: CPT | Performed by: STUDENT IN AN ORGANIZED HEALTH CARE EDUCATION/TRAINING PROGRAM

## 2025-03-27 PROCEDURE — 99211 OFF/OP EST MAY X REQ PHY/QHP: CPT | Performed by: STUDENT IN AN ORGANIZED HEALTH CARE EDUCATION/TRAINING PROGRAM

## 2025-03-27 NOTE — ASSESSMENT & PLAN NOTE
Approximately 2 months following completion of SRT the patient is doing well overall. We reviewed his repeat MRI Brain in detail in comparison to prior imaging studies. On my review this demonstrated his treated right frontal lobe lesion to be stable in size. We will plan to obtain repeat MRI Brain in 3 months with RTC thereafter.     Orders:    MRI Brain BT w wo Contrast; Future

## 2025-03-27 NOTE — PROGRESS NOTES
Ronal Miguel 1960 is a 64 y.o. male with Stage IV NSCLC with known brain metastases s/p prior SRS (2018) now with (presumed) recurrent disease. His imaging was reviewed at neuro-oncology tumor board with consensus that his enlarging previously treated right frontal lesion was most consistent with disease recurrence. On 1/28/25 he completed a course of SRT to a dose of 3000cGy/5fx.     He tolerated SRT well overall without substantial ill effect.      3/25/25 MRI brain BT w wo contrast  No significant interval change since prior examination.  Stable right frontal hemorrhagic metastatic lesion status post SRS therapy. Stable left parietal lobe tiny metastatic lesion. No new areas of metastatic disease identified.    Follow up visit     Oncology History   Metastatic non-small cell lung cancer (HCC)    Initial Diagnosis    Metastatic non-small cell lung cancer (HCC)     12/18/2013 - 1/2/2014 Radiation    Right parietal RT 2850 cGy    [facility]     12/23/2013 - 1/13/2014 Chemotherapy    Premetrexed and Carboplatin (Dr. Esquivel)     2/2018 - 2/2018 Radiation    Cyberknife, right frontal metastasis       12/12/2024 -  Cancer Staged    Staging form: Lung, AJCC 8th Edition  - Clinical: Stage IVB (pM1c) - Signed by Tye Shah MD on 12/12/2024 1/21/2025 - 1/28/2025 Radiation    Treatments:  Course: C1 SRT    Plan ID Energy Fractions Dose per Fraction (cGy) Dose Correction (cGy) Total Dose Delivered (cGy) Elapsed Days   SRT R Frontal 6X-FFF 5 / 5 600 0 3,000 7      Treatment Dates:  1/21/2025 - 1/28/2025.      Metastasis to brain (HCC)   12/12/2024 Initial Diagnosis    Metastasis to brain (HCC)     1/21/2025 - 1/28/2025 Radiation    Treatments:  Course: C1 SRT    Plan ID Energy Fractions Dose per Fraction (cGy) Dose Correction (cGy) Total Dose Delivered (cGy) Elapsed Days   SRT R Frontal 6X-FFF 5 / 5 600 0 3,000 7      Treatment Dates:  1/21/2025 - 1/28/2025.          Review of  Systems:  Review of Systems   Constitutional:  Positive for fatigue (improving since treatment).   HENT:          Xerostomia   Neurological:  Negative for dizziness, tremors, seizures, weakness, light-headedness, numbness and headaches.       Clinical Trial: no        Health Maintenance   Topic Date Due    Hepatitis C Screening  Never done    HIV Screening  Never done    BMI: Followup Plan  Never done    Annual Physical  Never done    Zoster Vaccine (1 of 2) Never done    Pneumococcal Vaccine: Pediatrics (0 to 5 Years) and At-Risk Patients (6 to 64 Years) (1 of 2 - PCV) Never done    DTaP,Tdap,and Td Vaccines (1 - Tdap) Never done    RSV Vaccine for Pregnant Patients and Patients Age 60+ Years (1 - Risk 60-74 years 1-dose series) Never done    Influenza Vaccine (1) 09/01/2024    COVID-19 Vaccine (4 - 2024-25 season) 09/01/2024    Colorectal Cancer Screening  11/29/2025    Depression Screening  12/11/2025    BMI: Adult  03/25/2026    Meningococcal B Vaccine  Aged Out    RSV Vaccine age 0-20 Months  Aged Out    HIB Vaccine  Aged Out    IPV Vaccine  Aged Out    Hepatitis A Vaccine  Aged Out    Meningococcal ACWY Vaccine  Aged Out    HPV Vaccine  Aged Out     Patient Active Problem List   Diagnosis    Metastatic non-small cell lung cancer (HCC)    Metastasis to brain (HCC)     Past Medical History:   Diagnosis Date    Cancer (HCC) 10/2013    Coronary artery disease 2018    Diabetes mellitus (HCC) 2023    Hypertension 2010    Not sire of danya     Past Surgical History:   Procedure Laterality Date    CHOLECYSTECTOMY  04/2024    [facility]- Dr Ferrera    MITRAL VALVE REPAIR  2019     Family History   Problem Relation Age of Onset    Lung cancer Mother     Thyroid disease unspecified Mother     Lung cancer Father      Social History     Socioeconomic History    Marital status: /Civil Union     Spouse name: Not on file    Number of children: Not on file    Years of education: Not on file    Highest education level: Not on  file   Occupational History    Not on file   Tobacco Use    Smoking status: Never    Smokeless tobacco: Never   Substance and Sexual Activity    Alcohol use: Yes     Alcohol/week: 7.0 standard drinks of alcohol     Types: 7 Cans of beer per week     Comment: Recently cur back. Was probably more several months ago    Drug use: Never    Sexual activity: Yes     Partners: Female     Birth control/protection: Post-menopausal, Male Sterilization   Other Topics Concern    Not on file   Social History Narrative    Not on file     Social Drivers of Health     Financial Resource Strain: Not on file   Food Insecurity: Not on file   Transportation Needs: Not on file   Physical Activity: Not on file   Stress: Not on file   Social Connections: Not on file   Intimate Partner Violence: Not on file   Housing Stability: Not on file       Current Outpatient Medications:     allopurinol (ZYLOPRIM) 300 mg tablet, Take 300 mg by mouth daily, Disp: , Rfl:     amLODIPine (NORVASC) 5 mg tablet, Take 5 mg by mouth daily, Disp: , Rfl:     amoxicillin-clavulanate (AUGMENTIN) 500-125 mg per tablet, TAKE 1 TABLET BY MOUTH TWICE A DAY FOR 7 DAYS WITH FOOD, Disp: , Rfl:     aspirin (ECOTRIN LOW STRENGTH) 81 mg EC tablet, Take 81 mg by mouth, Disp: , Rfl:     lisinopril (ZESTRIL) 40 mg tablet, Take 40 mg by mouth every morning, Disp: , Rfl:     Lorlatinib 25 MG TABS, Take 75 mg by mouth, Disp: , Rfl:     Magnesium 400 MG CAPS, Take 400 mg by mouth daily, Disp: , Rfl:     metoprolol succinate (TOPROL-XL) 25 mg 24 hr tablet, Take 50 mg by mouth daily, Disp: , Rfl:     metoprolol succinate (TOPROL-XL) 50 mg 24 hr tablet, 1 and 1/2 tabs (75mg) by mouth every day, Disp: , Rfl:     omega-3-acid ethyl esters (LOVAZA) 1 g capsule, Take 2 Capsules by mouth in the morning and 2 Capsules before bedtime., Disp: , Rfl:     Ozempic, 0.25 or 0.5 MG/DOSE, 2 MG/3ML injection pen, , Disp: , Rfl:     potassium chloride (Klor-Con M20) 20 mEq tablet, Take 20 mEq by  mouth 2 (two) times a day, Disp: , Rfl:     rosuvastatin (CRESTOR) 20 MG tablet, Take 20 mg by mouth daily, Disp: , Rfl:     torsemide (DEMADEX) 10 mg tablet, take two tablets by mouth in the morning and one tablet in the evening, Disp: , Rfl:   No Known Allergies  There were no vitals filed for this visit.

## 2025-03-27 NOTE — PROGRESS NOTES
Follow-up Visit   Name: Ronal Miguel      : 1960      MRN: [id number]  Encounter Provider: Tye Shah MD  Encounter Date: 3/27/2025   Encounter department: [facility] RADIATION ONCOLOGY  :  Assessment & Plan  Metastasis to brain (HCC)  Approximately 2 months following completion of SRT the patient is doing well overall. We reviewed his repeat MRI Brain in detail in comparison to prior imaging studies. On my review this demonstrated his treated right frontal lobe lesion to be stable in size. We will plan to obtain repeat MRI Brain in 3 months with RTC thereafter.     Orders:    MRI Brain BT w wo Contrast; Future        History of Present Illness   Chief Complaint   Patient presents with    Lung Cancer    Follow-up   Pertinent Medical History   Mr. Ronal Miguel is a 64 year old man with Stage IV NSCLC with known brain metastases s/p prior SRS () now with (presumed) recurrent disease. His imaging was reviewed at neuro-oncology tumor board with consensus that his enlarging previously treated right frontal lesion was most consistent with disease recurrence. On 25 he completed a course of SRT to a dose of 3000cGy/5fx.     Radiation Summary:  R Parietal RT 2850cGy (OSH), completed 2014  Cyberknife SRS (OSH), completed 2018  SRT to 3000cGy/5fx, completed 25    Interval History:  The patient was last seen in clinic at the completion of SRT. He tolerated treatment well overall without substantial ill effect. Following completion of SRT he did experience some fatigue, but now feels back to his usual self and has not had substantial ill effect otherwise.     MRI Brain (3/25/25) demonstrated:  No significant interval change since prior examination.  Stable right frontal hemorrhagic metastatic lesion status post SRS therapy. Stable left parietal lobe tiny metastatic lesion. No new areas of metastatic disease identified.       Oncology History   Cancer Staging   Metastatic  non-small cell lung cancer (HCC)  Staging form: Lung, AJCC 8th Edition  - Clinical: Stage IVB (pM1c) - Signed by Tye Shah MD on 12/12/2024  Oncology History   Metastatic non-small cell lung cancer (HCC)    Initial Diagnosis    Metastatic non-small cell lung cancer (HCC)     12/18/2013 - 1/2/2014 Radiation    Right parietal RT 2850 cGy    [facility]     12/23/2013 - 1/13/2014 Chemotherapy    Premetrexed and Carboplatin (Dr. Esquivel)     2/2018 - 2/2018 Radiation    Cyberknife, right frontal metastasis       12/12/2024 -  Cancer Staged    Staging form: Lung, AJCC 8th Edition  - Clinical: Stage IVB (pM1c) - Signed by Tye Shah MD on 12/12/2024 1/21/2025 - 1/28/2025 Radiation    Treatments:  Course: C1 SRT    Plan ID Energy Fractions Dose per Fraction (cGy) Dose Correction (cGy) Total Dose Delivered (cGy) Elapsed Days   SRT R Frontal 6X-FFF 5 / 5 600 0 3,000 7      Treatment Dates:  1/21/2025 - 1/28/2025.      Metastasis to brain (HCC)   12/12/2024 Initial Diagnosis    Metastasis to brain (HCC)     1/21/2025 - 1/28/2025 Radiation    Treatments:  Course: C1 SRT    Plan ID Energy Fractions Dose per Fraction (cGy) Dose Correction (cGy) Total Dose Delivered (cGy) Elapsed Days   SRT R Frontal 6X-FFF 5 / 5 600 0 3,000 7      Treatment Dates:  1/21/2025 - 1/28/2025.         Review of Systems Refer to nursing note.          Objective   /82   Pulse 81   Temp (!) 97 °F (36.1 °C)   Resp 16   Wt 120 kg (264 lb)   SpO2 96%   BMI 35.80 kg/m²     Pain Screening:  Pain Score: 0-No pain  ECOG ECOG Performance Status: 1 - Restricted in physically strenuous activity but ambulatory and able to carry out work of a light or sedentary nature, e.g., light house work, office work  Physical Exam   Well appearing. NAD.   No increased work of breathing.   Extremities warm and well perfused.   Alert and well oriented. Fluent speech. No overt neurologic deficits.  "      Administrative Statements   I have spent a total time of 22 minutes in caring for this patient on the day of the visit/encounter including Diagnostic results, Documenting in the medical record, Reviewing/placing orders in the medical record (including tests, medications, and/or procedures), and Obtaining or reviewing history  .  Portions of the record may have been created with voice recognition software.  Occasional wrong word or \"sound a like\" substitutions may have occurred due to the inherent limitations of voice recognition software.  Read the chart carefully and recognize, using context, where substitutions have occurred.  "

## 2025-03-31 ENCOUNTER — DOCUMENTATION (OUTPATIENT)
Dept: HEMATOLOGY ONCOLOGY | Facility: CLINIC | Age: 65
End: 2025-03-31

## 2025-03-31 NOTE — PROGRESS NOTES
Per chart review patient has been under surveillance and therefore oncology coordination is no longer needed. Both the patient and his wife Natalie have my direct number should something change.

## 2025-04-03 ENCOUNTER — TELEPHONE (OUTPATIENT)
Dept: NEUROSURGERY | Facility: CLINIC | Age: 65
End: 2025-04-03

## 2025-06-26 ENCOUNTER — HOSPITAL ENCOUNTER (OUTPATIENT)
Dept: MRI IMAGING | Facility: HOSPITAL | Age: 65
End: 2025-06-26
Attending: STUDENT IN AN ORGANIZED HEALTH CARE EDUCATION/TRAINING PROGRAM
Payer: COMMERCIAL

## 2025-06-26 DIAGNOSIS — C79.31 METASTASIS TO BRAIN (HCC): ICD-10-CM

## 2025-06-26 PROCEDURE — 70553 MRI BRAIN STEM W/O & W/DYE: CPT

## 2025-06-26 PROCEDURE — A9585 GADOBUTROL INJECTION: HCPCS | Performed by: STUDENT IN AN ORGANIZED HEALTH CARE EDUCATION/TRAINING PROGRAM

## 2025-06-26 RX ORDER — GADOBUTROL 604.72 MG/ML
12 INJECTION INTRAVENOUS
Status: COMPLETED | OUTPATIENT
Start: 2025-06-26 | End: 2025-06-26

## 2025-06-26 RX ADMIN — GADOBUTROL 12 ML: 604.72 INJECTION INTRAVENOUS at 14:16

## 2025-06-30 ENCOUNTER — DOCUMENTATION (OUTPATIENT)
Dept: HEMATOLOGY ONCOLOGY | Facility: CLINIC | Age: 65
End: 2025-06-30

## 2025-06-30 ENCOUNTER — TELEPHONE (OUTPATIENT)
Age: 65
End: 2025-06-30

## 2025-06-30 NOTE — TELEPHONE ENCOUNTER
Natalie is calling back to see if Dr Tye hSah has had a chance to look at the MRI, she would appreciate a call back, she can be reached at [phone].

## 2025-06-30 NOTE — PROGRESS NOTES
In-basket message received from Dr. Shah to add patient to the neuro [facility] on 7/2/2025. Chart reviewed and prep completed.

## 2025-06-30 NOTE — TELEPHONE ENCOUNTER
Patient wife Felicia calling to go over results of MRI and would like a call back at [phone].

## 2025-07-01 NOTE — TELEPHONE ENCOUNTER
Placed call to the patient's wife to review findings showing increased enhancement and edema. At present patient remains asymptomatic. Will plan for neurosurgery evaluation for potential resection and neuro oncology tumor board review. As he is asymptomatic will hold off on steroids at present.

## 2025-07-02 ENCOUNTER — DOCUMENTATION (OUTPATIENT)
Dept: HEMATOLOGY ONCOLOGY | Facility: CLINIC | Age: 65
End: 2025-07-02

## 2025-07-02 DIAGNOSIS — C79.31 METASTASIS TO BRAIN (HCC): Primary | ICD-10-CM

## 2025-07-02 RX ORDER — PANTOPRAZOLE SODIUM 40 MG/1
40 TABLET, DELAYED RELEASE ORAL DAILY
Qty: 60 TABLET | Refills: 1 | Status: SHIPPED | OUTPATIENT
Start: 2025-07-02

## 2025-07-02 RX ORDER — DEXAMETHASONE 1 MG
1 TABLET ORAL 2 TIMES DAILY WITH MEALS
Qty: 7 TABLET | Refills: 0 | Status: SHIPPED | OUTPATIENT
Start: 2025-07-31

## 2025-07-02 RX ORDER — DEXAMETHASONE 2 MG/1
TABLET ORAL
Qty: 105 TABLET | Refills: 0 | Status: SHIPPED | OUTPATIENT
Start: 2025-07-03 | End: 2025-07-31

## 2025-07-02 NOTE — PROGRESS NOTES
NEURO ONCOLOGY [facility] NOTE      NCCN guidelines were readily available for review at this discussion    DATE:  7/2/2025    PRESENTING DOCTOR: Dr Luke Shah    DIAGNOSIS:  Metastatic lung cancer  STAGING: Stage IVB (pM1c)   REASON FOR DISCUSSION: Imaging review for Plan of Care  Ronal Miguel is a 64 y.o. male who was presented at the Neuro Oncology Multidisciplinary Cancer Conference today. History of metastatic lung cancer since 2013. He received palliative radiation therapy to posterior skull in Jan 2014 by Dr Rivera Turner at [facility] in [place], [place]. He underwent Cyberknife in Feb 2018 at [facility] Cyberknife with Dr Jaime López.  He has been followed by Dr Abilio Esquivel at [facility] [place], [place] (near [place]). He has been on Lorlatinib since 2018. He was last presented at Neuro Oncology [facility] on 12/11/24 with recommendation to offer stereotactic radiation for presumed recurrent disease to a previously treated right frontal lesion. He completed a course of SRT on 1/28/25.    Imaging/Studies reviewed:   []CT scan  [x]MRI BRAIN 12//2/2024      MRI Brain BT 3/25/2025, 6/26/2025  []PET  [x] PET CT 11/27/2024    Pathology: NA    PHYSICIAN RECOMMENDED PLAN:  - Initiate steroids (to be ordered by Dr Shah)  - Re-stage with CT CAP and repeat MRI brain BT in 6 weeks (imaging to be ordered     by Dr Kenyon at 7/8/25 office visit)  - Consider surgical resection if no improvement/worsening of disease after trial of     steroid therapy    All specialty physicians & supportive services available.  Providers contributing to final recommendations noted below.    Neurology []    Neurosurgery [x]    Thoracic: []    HemOnc: [x]    RadOnc: [x]    Clinical Trials: Patient reviewed and is not a candidate for  clinical trial at [facility].  Gammatile is at provider discretion.    Genetics: Referral not recommended at this time      Future Appointments   Date  Time Provider Department [place]   7/8/2025 11:45 AM Samuel Kenyon MD [facility]      Team agreed to plan.The final treatment plan will be left to the discretion of the patient and the treating physician.     DISCLAIMERS:  TO THE TREATING PHYSICIAN:  This conference is a meeting of clinicians from various specialty areas who evaluate and discuss patients for whom a multidisciplinary treatment approach is being considered. Please note that the above opinion was a consensus of the conference attendees and is intended only to assist in quality care of the discussed patient.  The responsibility for follow up on the input given during the conference, along with any final decisions regarding plan of care, is that of the patient and the patient's provider. Accordingly, appointments have only been recommended based on this information and have NOT been scheduled unless otherwise noted.      TO THE PATIENT:  This summary is a brief record of major aspects of your cancer treatment. You may choose to share a copy with any of your doctors or nurses. However, this is not a detailed or comprehensive record of your care.

## 2025-07-02 NOTE — PROGRESS NOTES
Placed call to the patient's wife Natalie to review neuro oncology tumor boar recommendations. Per discussion patient will be started on steroid taper with plan for short interval MRI Brain in ~6 weeks. Instructions for steroid taper were provided and prescription was sent to the pharmacy on the patient's behalf. He will follow up with Dr. Kenyon for initial surgical discussion and to arrange for MRI Brain. I will remain available to see the patient back as needed.     Tye Shah MD  Dept of Radiation Oncology

## 2025-07-03 ENCOUNTER — TELEPHONE (OUTPATIENT)
Age: 65
End: 2025-07-03

## 2025-07-03 NOTE — TELEPHONE ENCOUNTER
Pt wife called and requested to move out 7/8/25 appt until steroids ordered by Oncology are done and next MRI is complete  around 8/21/25.  Moved appt to 8/27/25.

## 2025-07-17 ENCOUNTER — TELEPHONE (OUTPATIENT)
Age: 65
End: 2025-07-17

## 2025-07-17 DIAGNOSIS — C79.31 METASTASIS TO BRAIN (HCC): Primary | ICD-10-CM

## 2025-07-17 NOTE — TELEPHONE ENCOUNTER
RAD ONC - MRI Brain BT 8/21/25, 12:30 PM, [facility].  Appointment details reviewed / confirmed with Felicia...BRIAN

## 2025-07-17 NOTE — TELEPHONE ENCOUNTER
Pt wife Natalie would like a call back to discuss when is pt is suppose to get MRI for Dr. Shah? She stated that pt is suppose to be on steroids for 5 weeks and then at week 6, he's to go in for an MRI.  But Natalie isn't sure if this is correct plan of care for pt. She would like a call back at [phone]. Thank you.

## 2025-07-17 NOTE — TELEPHONE ENCOUNTER
Patient was to see Neurosurgery on 7/8/25 and plan was for them to order and schedule MRI per tumor conference recommendation.. Wife canceled that appointment so MRI was never ordered or scheduled. Dr. Shah aware and he will order and our office will schedule MRI prior to neurosurgery follow up on 8/27/25.       Called Felicia. Made her aware of above. She requested it at [facility] and that it be done anytime between 8/19/25- 8/22/25.

## 2025-08-13 ENCOUNTER — TELEPHONE (OUTPATIENT)
Dept: NEUROSURGERY | Facility: CLINIC | Age: 65
End: 2025-08-13

## (undated) DEVICE — DRAIN CHEST PLEUREVAC LATEX FREE

## (undated) DEVICE — PAD HEMOSTASIS QUIKCLOT 4X4"

## (undated) DEVICE — TIP BOVIE BLADE COATED 6IN

## (undated) DEVICE — RADIAL ARTERY CATH SET 3 FR

## (undated) DEVICE — DRESSING MEPILEX 4X4 BORDER

## (undated) DEVICE — CATH IV 14G X 2IN ACUVANCE PLUS

## (undated) DEVICE — IAB SENSATION PLUS 8FR 50ML FIBEROPTIC DATASCOPE CONNECTOR W

## (undated) DEVICE — PAD HEMOSTASIS QUIK CLOT CONTROL  12 X 12

## (undated) DEVICE — DEVICE ENDO SUTURE CK COR-KNOT

## (undated) DEVICE — SHEATH ULTIMUM 7FR ACT 12CM 10/BX

## (undated) DEVICE — MASTISOL LIQUID ADHESIVE

## (undated) DEVICE — DRAPE AESOP ROBOT

## (undated) DEVICE — Device

## (undated) DEVICE — UNIT LOADING FO CK COR-KNOT DEVICE

## (undated) DEVICE — TROCAR FIOS 5MM X 150MM

## (undated) DEVICE — ADHESIVE SKIN DERMABOND ADVANCED 0.7ML

## (undated) DEVICE — SOLN IRRIG .9%SOD 3L

## (undated) DEVICE — TRAY URINE METER SILVER TEMP SENSING 16 FR LATEX

## (undated) DEVICE — MYOWIRE WHITE TEMP PACING WIRE 3/8 CIRCLE NEEDLE

## (undated) DEVICE — CATH 035 AMPLATZ 260 STIFF

## (undated) DEVICE — KIT ARTERIAL LINE

## (undated) DEVICE — KIT CATH LAB ANGIO

## (undated) DEVICE — SET MICROPUNCTURE INTRO

## (undated) DEVICE — CLOSURE ENDO AUTO SUTURE

## (undated) DEVICE — DENVER INTRODUCER SET 16FR

## (undated) DEVICE — SUTURE ETHIBOND 2-0  X833H

## (undated) DEVICE — ***USE 138562*** SUTURE SILK 2  SA8H

## (undated) DEVICE — PROTEKDUO KIT - DL31

## (undated) DEVICE — EVARREST FIBRIN SEALANT PATCH 2X4

## (undated) DEVICE — MANIFOLD FOUR PORT NEPTUNE

## (undated) DEVICE — APPLICATOR CHLORAPREP 26ML ORANGE TINT

## (undated) DEVICE — SUTURE ETHIBOND 3-0  X588H

## (undated) DEVICE — ***USE 149534*** FOG-OUT ANTI-FOG MEDC

## (undated) DEVICE — GUIDEWIRE AMPLATZ EXTRA STIFF .035 X 260CM

## (undated) DEVICE — CHECK FLO INTRODUCER SET 16FR 70CM

## (undated) DEVICE — PAD DEFIB BIPHASIC HANDS FREE

## (undated) DEVICE — SUTURE GORTEX CV5

## (undated) DEVICE — CATHETER 7FR SWAN

## (undated) DEVICE — TUBING INSUFFLATION HI-FLOW MEDC

## (undated) DEVICE — DRESSING MEPILEX BORDER AG 4X4

## (undated) DEVICE — STRYKEFLOW 2 W/ DISP TIP

## (undated) DEVICE — ***USE 56955*** SUTURE VICRYL 2-0  J339H CT-1

## (undated) DEVICE — DRESSING TEGADERM CHG PORT 4.75" X 4.75"

## (undated) DEVICE — RELOADS FOR CK COR-KNOT 6EA/PK

## (undated) DEVICE — GLOVE SZ 8 MICRO PROTEXIS LATEX

## (undated) DEVICE — SHEATH INTRODUCER PRELUDE IDEAL HYDROPHILIC SF 6F .021MM

## (undated) DEVICE — SOLN IRRIG STER WATER 1000ML

## (undated) DEVICE — SUTURE TEVDEK 3-0 SA T2 7-743

## (undated) DEVICE — COVER CAMERA LIGHT HANDLE

## (undated) DEVICE — SET CENTRAL VENOUS CATHETER

## (undated) DEVICE — SUTURE SILK 0 K834H

## (undated) DEVICE — ADHESIVE, SKIN DERMABOND ADV .7 ML

## (undated) DEVICE — PACK OPEN HEART SUTURE

## (undated) DEVICE — SPONGE LAP DISPOSABLE 18X18

## (undated) DEVICE — HEMOSTAT SURGICEL ABSORBABLE 4 X 8

## (undated) DEVICE — GUIDEWIRE DIAGNOSTIC 035-260 EXCHANGE

## (undated) DEVICE — RETRACTOR SMALL WOUND

## (undated) DEVICE — SUTURE PROLENE 4-0   8521H

## (undated) DEVICE — SOLN IRRIG .9%SOD 1000ML

## (undated) DEVICE — BASIN SET STANDARD PACK

## (undated) DEVICE — CABLE EXTENSION SCREW DOWN 6FT ATRIAL/VENTRICULAR

## (undated) DEVICE — GLOVE SZ 8.5 LINER PROTEXIS PI BL

## (undated) DEVICE — SUTURE CARDIO FLOW 2-0

## (undated) DEVICE — SHEATH INTRODUCER PRELUDE IDEAL HYDROPHILIC SF 7F .021MM